# Patient Record
Sex: FEMALE | Race: WHITE | Employment: OTHER | URBAN - METROPOLITAN AREA
[De-identification: names, ages, dates, MRNs, and addresses within clinical notes are randomized per-mention and may not be internally consistent; named-entity substitution may affect disease eponyms.]

---

## 2019-06-14 ENCOUNTER — TELEPHONE (OUTPATIENT)
Dept: SURGERY | Age: 74
End: 2019-06-14

## 2019-06-14 ENCOUNTER — INITIAL CONSULT (OUTPATIENT)
Dept: SURGERY | Age: 74
End: 2019-06-14
Payer: MEDICARE

## 2019-06-14 VITALS
DIASTOLIC BLOOD PRESSURE: 83 MMHG | HEIGHT: 66 IN | TEMPERATURE: 98.3 F | HEART RATE: 98 BPM | OXYGEN SATURATION: 96 % | BODY MASS INDEX: 28.77 KG/M2 | WEIGHT: 179 LBS | SYSTOLIC BLOOD PRESSURE: 133 MMHG | RESPIRATION RATE: 18 BRPM

## 2019-06-14 DIAGNOSIS — R10.11 RIGHT UPPER QUADRANT ABDOMINAL PAIN: Primary | ICD-10-CM

## 2019-06-14 PROCEDURE — 1090F PRES/ABSN URINE INCON ASSESS: CPT | Performed by: SURGERY

## 2019-06-14 PROCEDURE — G8427 DOCREV CUR MEDS BY ELIG CLIN: HCPCS | Performed by: SURGERY

## 2019-06-14 PROCEDURE — 99214 OFFICE O/P EST MOD 30 MIN: CPT | Performed by: SURGERY

## 2019-06-14 PROCEDURE — G8419 CALC BMI OUT NRM PARAM NOF/U: HCPCS | Performed by: SURGERY

## 2019-06-14 NOTE — PROGRESS NOTES
Reigna Garcia  6/14/2019      Office Consult    CHIEF COMPLAINT:  Right abdominal pain    HISTORY OF PRESENT ILLNESS:  Regina Garcia is a 74 y.o.  female with right sided abdominal pains for several years. She had an incisional hernia repair with mesh by Dr. Debbie Lozano at [facility] in 2016. CT scans twice have been normal over the years. On exam there is no hernia but the abdominal wall is firm and tender. She was examined at [facility] clinic for the pain but they would not see her due to her smoking. Past Medical History: She has a past medical history of Hypertension and Lymphoma ([facility] [address] Past Surgical History: She has a past surgical history that includes Cholecystectomy, open; Appendectomy; Incisional hernia repair; and Hysterectomy. Home Medications  Prior to Visit Medications    Medication Sig Taking? Authorizing Provider   acetaminophen (TYLENOL) 500 MG tablet Take 1,000 mg by mouth every 6 hours as needed for Pain Yes Historical Provider, MD   valsartan-hydrochlorothiazide (DIOVAN-HCT) 160-25 MG per tablet Take 1 tablet by mouth daily Yes Historical Provider, MD       Allergies: Bactrim [sulfamethoxazole-trimethoprim]; Ibuprofen; and Codeine   Social History:   TOBACCO:   reports that she has been smoking. She has been smoking about 2.00 packs per day. She has never used smokeless tobacco.  All smokers must join the free smoking cessation program and stop smoking for 3 months before having any Bariatric surgery. ETOH:    reports that she does not drink alcohol. History reviewed. No pertinent family history. Review of Systems:  Psychiatric:  depression and anxiety  Respiratory: smoker  Cardiovascular: negative  Gastrointestinal: diarrhea and constipation  Musculoskeletal:negative  All others reviewed, negative    Physical Exam:   VITALS: Blood pressure 133/83, pulse 98, temperature 98.3 °F (36.8 °C), temperature source Oral, resp.  rate 18, height 5' 6\" (1.676 m), weight 179 lb (81.2 kg),

## 2019-06-14 NOTE — TELEPHONE ENCOUNTER
PEr DR Fransisco Trejo pt to be scheduled for CT abd/pelvis w wo IV oral - Pt scheduled for 6/19/19 and follow up scheduled 6/21/19 pt given written and verbal instruction and expressed understanding.  Electronically signed by Saba Diaz MA on 6/14/19 at 2:38 PM

## 2019-06-19 ENCOUNTER — HOSPITAL ENCOUNTER (OUTPATIENT)
Age: 74
Discharge: HOME | End: 2019-06-19
Payer: MEDICARE

## 2019-06-19 ENCOUNTER — HOSPITAL ENCOUNTER (OUTPATIENT)
Dept: CT IMAGING | Age: 74
Discharge: HOME | End: 2019-06-19
Payer: MEDICARE

## 2019-06-19 DIAGNOSIS — R10.11 RIGHT UPPER QUADRANT ABDOMINAL PAIN: ICD-10-CM

## 2019-06-19 LAB
ANION GAP SERPL CALCULATED.3IONS-SCNC: 13 MMOL/L (ref 7–16)
BUN BLDV-MCNC: 25 MG/DL (ref 8–23)
CALCIUM SERPL-MCNC: 10.1 MG/DL (ref 8.6–10.2)
CHLORIDE BLD-SCNC: 98 MMOL/L (ref 98–107)
CO2: 30 MMOL/L (ref 22–29)
CREAT SERPL-MCNC: 1.4 MG/DL (ref 0.5–1)
GFR AFRICAN AMERICAN: 44
GFR NON-AFRICAN AMERICAN: 37 ML/MIN/1.73
GLUCOSE BLD-MCNC: 113 MG/DL (ref 74–99)
POTASSIUM SERPL-SCNC: 3.7 MMOL/L (ref 3.5–5)
SODIUM BLD-SCNC: 141 MMOL/L (ref 132–146)

## 2019-06-19 PROCEDURE — 36415 COLL VENOUS BLD VENIPUNCTURE: CPT

## 2019-06-19 PROCEDURE — 74177 CT ABD & PELVIS W/CONTRAST: CPT

## 2019-06-19 PROCEDURE — 6360000004 HC RX CONTRAST MEDICATION: Performed by: RADIOLOGY

## 2019-06-19 PROCEDURE — 80048 BASIC METABOLIC PNL TOTAL CA: CPT

## 2019-06-19 RX ADMIN — IOHEXOL 50 ML: 240 INJECTION, SOLUTION INTRATHECAL; INTRAVASCULAR; INTRAVENOUS; ORAL at 13:05

## 2019-06-19 RX ADMIN — IOPAMIDOL 80 ML: 755 INJECTION, SOLUTION INTRAVENOUS at 13:06

## 2019-06-21 ENCOUNTER — OFFICE VISIT (OUTPATIENT)
Dept: SURGERY | Age: 74
End: 2019-06-21
Payer: MEDICARE

## 2019-06-21 VITALS
WEIGHT: 179 LBS | DIASTOLIC BLOOD PRESSURE: 85 MMHG | BODY MASS INDEX: 28.77 KG/M2 | SYSTOLIC BLOOD PRESSURE: 138 MMHG | HEART RATE: 107 BPM | HEIGHT: 66 IN | TEMPERATURE: 98.5 F

## 2019-06-21 DIAGNOSIS — R10.84 GENERALIZED ABDOMINAL PAIN: ICD-10-CM

## 2019-06-21 PROCEDURE — 3017F COLORECTAL CA SCREEN DOC REV: CPT | Performed by: SURGERY

## 2019-06-21 PROCEDURE — G8427 DOCREV CUR MEDS BY ELIG CLIN: HCPCS | Performed by: SURGERY

## 2019-06-21 PROCEDURE — 1123F ACP DISCUSS/DSCN MKR DOCD: CPT | Performed by: SURGERY

## 2019-06-21 PROCEDURE — 1090F PRES/ABSN URINE INCON ASSESS: CPT | Performed by: SURGERY

## 2019-06-21 PROCEDURE — G8419 CALC BMI OUT NRM PARAM NOF/U: HCPCS | Performed by: SURGERY

## 2019-06-21 PROCEDURE — 99213 OFFICE O/P EST LOW 20 MIN: CPT | Performed by: SURGERY

## 2019-06-21 PROCEDURE — G8400 PT W/DXA NO RESULTS DOC: HCPCS | Performed by: SURGERY

## 2019-06-21 PROCEDURE — 4040F PNEUMOC VAC/ADMIN/RCVD: CPT | Performed by: SURGERY

## 2019-06-21 PROCEDURE — 4004F PT TOBACCO SCREEN RCVD TLK: CPT | Performed by: SURGERY

## 2019-07-21 ENCOUNTER — APPOINTMENT (OUTPATIENT)
Dept: CT IMAGING | Age: 74
End: 2019-07-21
Payer: MEDICARE

## 2019-07-21 ENCOUNTER — HOSPITAL ENCOUNTER (EMERGENCY)
Age: 74
Discharge: HOME | End: 2019-07-21
Payer: MEDICARE

## 2019-07-21 VITALS
HEIGHT: 66 IN | OXYGEN SATURATION: 95 % | HEART RATE: 86 BPM | WEIGHT: 182 LBS | RESPIRATION RATE: 18 BRPM | DIASTOLIC BLOOD PRESSURE: 82 MMHG | SYSTOLIC BLOOD PRESSURE: 176 MMHG | BODY MASS INDEX: 29.25 KG/M2 | TEMPERATURE: 98.5 F

## 2019-07-21 DIAGNOSIS — M54.12 CERVICAL RADICULOPATHY: Primary | ICD-10-CM

## 2019-07-21 LAB
ANION GAP SERPL CALCULATED.3IONS-SCNC: 13 MMOL/L (ref 7–16)
BUN BLDV-MCNC: 15 MG/DL (ref 8–23)
CALCIUM SERPL-MCNC: 9.4 MG/DL (ref 8.6–10.2)
CHLORIDE BLD-SCNC: 104 MMOL/L (ref 98–107)
CO2: 26 MMOL/L (ref 22–29)
CREAT SERPL-MCNC: 1 MG/DL (ref 0.5–1)
GFR AFRICAN AMERICAN: >60
GFR NON-AFRICAN AMERICAN: 54 ML/MIN/1.73
GLUCOSE BLD-MCNC: 104 MG/DL (ref 74–99)
HCT VFR BLD CALC: 42 % (ref 34–48)
HEMOGLOBIN: 13.9 G/DL (ref 11.5–15.5)
MCH RBC QN AUTO: 30.5 PG (ref 26–35)
MCHC RBC AUTO-ENTMCNC: 33.1 % (ref 32–34.5)
MCV RBC AUTO: 92.3 FL (ref 80–99.9)
PDW BLD-RTO: 13.1 FL (ref 11.5–15)
PLATELET # BLD: 313 E9/L (ref 130–450)
PMV BLD AUTO: 10.2 FL (ref 7–12)
POTASSIUM SERPL-SCNC: 4.2 MMOL/L (ref 3.5–5)
RBC # BLD: 4.55 E12/L (ref 3.5–5.5)
SODIUM BLD-SCNC: 143 MMOL/L (ref 132–146)
TROPONIN: <0.01 NG/ML (ref 0–0.03)
WBC # BLD: 9.3 E9/L (ref 4.5–11.5)

## 2019-07-21 PROCEDURE — 6360000002 HC RX W HCPCS: Performed by: PHYSICIAN ASSISTANT

## 2019-07-21 PROCEDURE — 84484 ASSAY OF TROPONIN QUANT: CPT

## 2019-07-21 PROCEDURE — 6370000000 HC RX 637 (ALT 250 FOR IP): Performed by: PHYSICIAN ASSISTANT

## 2019-07-21 PROCEDURE — 96372 THER/PROPH/DIAG INJ SC/IM: CPT

## 2019-07-21 PROCEDURE — 72125 CT NECK SPINE W/O DYE: CPT

## 2019-07-21 PROCEDURE — 80048 BASIC METABOLIC PNL TOTAL CA: CPT

## 2019-07-21 PROCEDURE — 36415 COLL VENOUS BLD VENIPUNCTURE: CPT

## 2019-07-21 PROCEDURE — 99284 EMERGENCY DEPT VISIT MOD MDM: CPT

## 2019-07-21 PROCEDURE — 85027 COMPLETE CBC AUTOMATED: CPT

## 2019-07-21 PROCEDURE — 93005 ELECTROCARDIOGRAM TRACING: CPT | Performed by: PHYSICIAN ASSISTANT

## 2019-07-21 RX ORDER — PREDNISONE 10 MG/1
40 TABLET ORAL DAILY
Qty: 20 TABLET | Refills: 0 | Status: SHIPPED | OUTPATIENT
Start: 2019-07-21 | End: 2019-07-26

## 2019-07-21 RX ORDER — ORPHENADRINE CITRATE 100 MG/1
100 TABLET, EXTENDED RELEASE ORAL ONCE
Status: COMPLETED | OUTPATIENT
Start: 2019-07-21 | End: 2019-07-21

## 2019-07-21 RX ORDER — ORPHENADRINE CITRATE 100 MG/1
100 TABLET, EXTENDED RELEASE ORAL 2 TIMES DAILY
Qty: 20 TABLET | Refills: 0 | Status: SHIPPED | OUTPATIENT
Start: 2019-07-21 | End: 2019-07-31

## 2019-07-21 RX ORDER — DEXAMETHASONE SODIUM PHOSPHATE 10 MG/ML
10 INJECTION INTRAMUSCULAR; INTRAVENOUS ONCE
Status: COMPLETED | OUTPATIENT
Start: 2019-07-21 | End: 2019-07-21

## 2019-07-21 RX ADMIN — DEXAMETHASONE SODIUM PHOSPHATE 10 MG: 10 INJECTION INTRAMUSCULAR; INTRAVENOUS at 16:53

## 2019-07-21 RX ADMIN — ORPHENADRINE CITRATE 100 MG: 100 TABLET, EXTENDED RELEASE ORAL at 16:53

## 2019-07-21 ASSESSMENT — PAIN SCALES - GENERAL: PAINLEVEL_OUTOF10: 7

## 2019-07-21 NOTE — ED PROVIDER NOTES
[facility]  HPI:  7/21/19, Time: 4:34 PM         Roderick Johnson is a 66 y.o. female presenting to the ED for left arm pain , beginning 3 Days  ago. The complaint has been persistent, moderate in severity, and worsened by nothing. Patient comes in with complaint of left lateral neck pain arm pain started 2 to 3 days ago. She denies any known injury. 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 She believes she may have slept wrong couple nights ago. She denies any weakness of the extremity. Pain radiates from the lateral neck to the elbow area. Patient denies any chest pain shortness of breath diaphoresis palpitations. Pain is relieved with elevating the arm. Review of Systems:   Pertinent positives and negatives are stated within HPI, all other systems reviewed and are negative.          --------------------------------------------- PAST HISTORY ---------------------------------------------  Past Medical History:  has a past medical history of Hypertension and Lymphoma ([facility] [address] Past Surgical History:  has a past surgical history that includes Cholecystectomy, open; Appendectomy; Incisional hernia repair; and Hysterectomy. Social History:  reports that she has been smoking. She has been smoking about 2.00 packs per day. She has never used smokeless tobacco. She reports that she does not drink alcohol or use drugs. Family History: family history is not on file. The patients home medications have been reviewed. Allergies: Bactrim [sulfamethoxazole-trimethoprim];  Ibuprofen; and Codeine    -------------------------------------------------- RESULTS -------------------------------------------------  All laboratory and radiology results have been personally reviewed by myself   LABS:  Results for orders placed or performed during the hospital encounter of 70/24/55   Basic metabolic panel   Result Value Ref Range    Sodium 143 132 - 146 mmol/L    Potassium 4.2 3.5 - 5.0 mmol/L

## 2019-07-22 LAB
EKG ATRIAL RATE: 75 BPM
EKG P AXIS: 72 DEGREES
EKG P-R INTERVAL: 174 MS
EKG Q-T INTERVAL: 378 MS
EKG QRS DURATION: 78 MS
EKG QTC CALCULATION (BAZETT): 422 MS
EKG R AXIS: -62 DEGREES
EKG T AXIS: 58 DEGREES
EKG VENTRICULAR RATE: 75 BPM

## 2019-07-22 PROCEDURE — 93010 ELECTROCARDIOGRAM REPORT: CPT | Performed by: INTERNAL MEDICINE

## 2022-01-01 ENCOUNTER — ANESTHESIA EVENT (OUTPATIENT)
Dept: ENDOSCOPY | Age: 77
End: 2022-01-01

## 2022-01-01 ENCOUNTER — APPOINTMENT (OUTPATIENT)
Dept: CT IMAGING | Age: 77
DRG: 393 | End: 2022-01-01
Payer: MEDICARE

## 2022-01-01 ENCOUNTER — APPOINTMENT (OUTPATIENT)
Dept: GENERAL RADIOLOGY | Age: 77
DRG: 393 | End: 2022-01-01
Payer: MEDICARE

## 2022-01-01 ENCOUNTER — HOSPITAL ENCOUNTER (INPATIENT)
Age: 77
LOS: 7 days | Discharge: NURSING, REHAB OR LONG-TERM CARE | DRG: 393 | End: 2022-10-28
Attending: EMERGENCY MEDICINE | Admitting: INTERNAL MEDICINE
Payer: MEDICARE

## 2022-01-01 ENCOUNTER — APPOINTMENT (OUTPATIENT)
Dept: ULTRASOUND IMAGING | Age: 77
DRG: 393 | End: 2022-01-01
Payer: MEDICARE

## 2022-01-01 ENCOUNTER — ANESTHESIA (OUTPATIENT)
Dept: ENDOSCOPY | Age: 77
End: 2022-01-01

## 2022-01-01 ENCOUNTER — APPOINTMENT (OUTPATIENT)
Dept: MRI IMAGING | Age: 77
DRG: 393 | End: 2022-01-01
Payer: MEDICARE

## 2022-01-01 VITALS
RESPIRATION RATE: 20 BRPM | BODY MASS INDEX: 33.91 KG/M2 | TEMPERATURE: 97.3 F | DIASTOLIC BLOOD PRESSURE: 78 MMHG | OXYGEN SATURATION: 99 % | HEART RATE: 105 BPM | HEIGHT: 66 IN | SYSTOLIC BLOOD PRESSURE: 109 MMHG | WEIGHT: 211 LBS

## 2022-01-01 DIAGNOSIS — N17.9 ACUTE KIDNEY INJURY (HCC): ICD-10-CM

## 2022-01-01 DIAGNOSIS — K52.9 PROCTOCOLITIS: Primary | ICD-10-CM

## 2022-01-01 DIAGNOSIS — K57.90 DIVERTICULOSIS: ICD-10-CM

## 2022-01-01 LAB
ACINETOBACTER CALCOAC BAUMANNII COMPLEX BY PCR: NOT DETECTED
ALBUMIN SERPL-MCNC: 3.1 G/DL (ref 3.5–5.2)
ALBUMIN SERPL-MCNC: 3.3 G/DL (ref 3.5–5.2)
ALBUMIN SERPL-MCNC: 3.3 G/DL (ref 3.5–5.2)
ALBUMIN SERPL-MCNC: 3.4 G/DL (ref 3.5–5.2)
ALBUMIN SERPL-MCNC: 3.4 G/DL (ref 3.5–5.2)
ALBUMIN SERPL-MCNC: 3.5 G/DL (ref 3.5–5.2)
ALBUMIN SERPL-MCNC: 3.6 G/DL (ref 3.5–5.2)
ALBUMIN SERPL-MCNC: 3.8 G/DL (ref 3.5–5.2)
ALBUMIN SERPL-MCNC: 4.1 G/DL (ref 3.5–5.2)
ALP BLD-CCNC: 105 U/L (ref 35–104)
ALP BLD-CCNC: 86 U/L (ref 35–104)
ALP BLD-CCNC: 86 U/L (ref 35–104)
ALP BLD-CCNC: 87 U/L (ref 35–104)
ALP BLD-CCNC: 92 U/L (ref 35–104)
ALP BLD-CCNC: 95 U/L (ref 35–104)
ALP BLD-CCNC: 96 U/L (ref 35–104)
ALP BLD-CCNC: 98 U/L (ref 35–104)
ALP BLD-CCNC: 99 U/L (ref 35–104)
ALT SERPL-CCNC: 109 U/L (ref 0–32)
ALT SERPL-CCNC: 123 U/L (ref 0–32)
ALT SERPL-CCNC: 19 U/L (ref 0–32)
ALT SERPL-CCNC: 20 U/L (ref 0–32)
ALT SERPL-CCNC: 25 U/L (ref 0–32)
ALT SERPL-CCNC: 41 U/L (ref 0–32)
ALT SERPL-CCNC: 65 U/L (ref 0–32)
ALT SERPL-CCNC: 83 U/L (ref 0–32)
ALT SERPL-CCNC: 87 U/L (ref 0–32)
ANION GAP SERPL CALCULATED.3IONS-SCNC: 10 MMOL/L (ref 7–16)
ANION GAP SERPL CALCULATED.3IONS-SCNC: 11 MMOL/L (ref 7–16)
ANION GAP SERPL CALCULATED.3IONS-SCNC: 12 MMOL/L (ref 7–16)
ANION GAP SERPL CALCULATED.3IONS-SCNC: 13 MMOL/L (ref 7–16)
ANION GAP SERPL CALCULATED.3IONS-SCNC: 14 MMOL/L (ref 7–16)
ANION GAP SERPL CALCULATED.3IONS-SCNC: 14 MMOL/L (ref 7–16)
ANION GAP SERPL CALCULATED.3IONS-SCNC: 15 MMOL/L (ref 7–16)
ANION GAP SERPL CALCULATED.3IONS-SCNC: 17 MMOL/L (ref 7–16)
ANION GAP SERPL CALCULATED.3IONS-SCNC: 19 MMOL/L (ref 7–16)
ANION GAP SERPL CALCULATED.3IONS-SCNC: 20 MMOL/L (ref 7–16)
AST SERPL-CCNC: 111 U/L (ref 0–31)
AST SERPL-CCNC: 156 U/L (ref 0–31)
AST SERPL-CCNC: 26 U/L (ref 0–31)
AST SERPL-CCNC: 30 U/L (ref 0–31)
AST SERPL-CCNC: 33 U/L (ref 0–31)
AST SERPL-CCNC: 45 U/L (ref 0–31)
AST SERPL-CCNC: 53 U/L (ref 0–31)
AST SERPL-CCNC: 84 U/L (ref 0–31)
AST SERPL-CCNC: 88 U/L (ref 0–31)
BACTEROIDES FRAGILIS BY PCR: NOT DETECTED
BASOPHILS ABSOLUTE: 0.01 E9/L (ref 0–0.2)
BASOPHILS ABSOLUTE: 0.02 E9/L (ref 0–0.2)
BASOPHILS ABSOLUTE: 0.03 E9/L (ref 0–0.2)
BASOPHILS RELATIVE PERCENT: 0.1 % (ref 0–2)
BASOPHILS RELATIVE PERCENT: 0.2 % (ref 0–2)
BASOPHILS RELATIVE PERCENT: 0.3 % (ref 0–2)
BASOPHILS RELATIVE PERCENT: 0.3 % (ref 0–2)
BASOPHILS RELATIVE PERCENT: 0.4 % (ref 0–2)
BILIRUB SERPL-MCNC: 0.7 MG/DL (ref 0–1.2)
BILIRUB SERPL-MCNC: 0.9 MG/DL (ref 0–1.2)
BILIRUB SERPL-MCNC: 0.9 MG/DL (ref 0–1.2)
BILIRUB SERPL-MCNC: 1 MG/DL (ref 0–1.2)
BILIRUB SERPL-MCNC: 1.1 MG/DL (ref 0–1.2)
BILIRUB SERPL-MCNC: 1.2 MG/DL (ref 0–1.2)
BILIRUB SERPL-MCNC: 1.2 MG/DL (ref 0–1.2)
BILIRUB SERPL-MCNC: 1.5 MG/DL (ref 0–1.2)
BILIRUB SERPL-MCNC: 1.8 MG/DL (ref 0–1.2)
BILIRUBIN DIRECT: 0.4 MG/DL (ref 0–0.3)
BILIRUBIN DIRECT: 0.5 MG/DL (ref 0–0.3)
BILIRUBIN DIRECT: 0.7 MG/DL (ref 0–0.3)
BILIRUBIN DIRECT: 0.8 MG/DL (ref 0–0.3)
BILIRUBIN DIRECT: <0.2 MG/DL (ref 0–0.3)
BILIRUBIN, INDIRECT: 0.3 MG/DL (ref 0–1)
BILIRUBIN, INDIRECT: 0.4 MG/DL (ref 0–1)
BILIRUBIN, INDIRECT: 0.5 MG/DL (ref 0–1)
BILIRUBIN, INDIRECT: 0.7 MG/DL (ref 0–1)
BILIRUBIN, INDIRECT: 1 MG/DL (ref 0–1)
BILIRUBIN, INDIRECT: ABNORMAL MG/DL (ref 0–1)
BLOOD CULTURE, ROUTINE: ABNORMAL
BLOOD CULTURE, ROUTINE: NORMAL
BOTTLE TYPE: ABNORMAL
BUN BLDV-MCNC: 58 MG/DL (ref 6–23)
BUN BLDV-MCNC: 60 MG/DL (ref 6–23)
BUN BLDV-MCNC: 65 MG/DL (ref 6–23)
BUN BLDV-MCNC: 74 MG/DL (ref 6–23)
BUN BLDV-MCNC: 75 MG/DL (ref 6–23)
BUN BLDV-MCNC: 77 MG/DL (ref 6–23)
BUN BLDV-MCNC: 79 MG/DL (ref 6–23)
BUN BLDV-MCNC: 82 MG/DL (ref 6–23)
BUN BLDV-MCNC: 83 MG/DL (ref 6–23)
BUN BLDV-MCNC: 83 MG/DL (ref 6–23)
BUN BLDV-MCNC: 84 MG/DL (ref 6–23)
BUN BLDV-MCNC: 85 MG/DL (ref 6–23)
BUN BLDV-MCNC: 87 MG/DL (ref 6–23)
BUN BLDV-MCNC: 88 MG/DL (ref 6–23)
BUN BLDV-MCNC: 90 MG/DL (ref 6–23)
BUN BLDV-MCNC: 90 MG/DL (ref 6–23)
BUN BLDV-MCNC: 91 MG/DL (ref 6–23)
CALCIUM SERPL-MCNC: 8.1 MG/DL (ref 8.6–10.2)
CALCIUM SERPL-MCNC: 8.2 MG/DL (ref 8.6–10.2)
CALCIUM SERPL-MCNC: 8.3 MG/DL (ref 8.6–10.2)
CALCIUM SERPL-MCNC: 8.4 MG/DL (ref 8.6–10.2)
CALCIUM SERPL-MCNC: 8.5 MG/DL (ref 8.6–10.2)
CALCIUM SERPL-MCNC: 8.6 MG/DL (ref 8.6–10.2)
CALCIUM SERPL-MCNC: 8.7 MG/DL (ref 8.6–10.2)
CALCIUM SERPL-MCNC: 8.7 MG/DL (ref 8.6–10.2)
CALCIUM SERPL-MCNC: 8.8 MG/DL (ref 8.6–10.2)
CALCIUM SERPL-MCNC: 8.8 MG/DL (ref 8.6–10.2)
CALCIUM SERPL-MCNC: 8.9 MG/DL (ref 8.6–10.2)
CALCIUM SERPL-MCNC: 8.9 MG/DL (ref 8.6–10.2)
CALCIUM SERPL-MCNC: 9 MG/DL (ref 8.6–10.2)
CALCIUM SERPL-MCNC: 9 MG/DL (ref 8.6–10.2)
CALCIUM SERPL-MCNC: 9.6 MG/DL (ref 8.6–10.2)
CANDIDA ALBICANS BY PCR: NOT DETECTED
CANDIDA AURIS BY PCR: NOT DETECTED
CANDIDA GLABRATA BY PCR: NOT DETECTED
CANDIDA KRUSEI BY PCR: NOT DETECTED
CANDIDA PARAPSILOSIS BY PCR: NOT DETECTED
CANDIDA TROPICALIS BY PCR: NOT DETECTED
CHLORIDE BLD-SCNC: 100 MMOL/L (ref 98–107)
CHLORIDE BLD-SCNC: 101 MMOL/L (ref 98–107)
CHLORIDE BLD-SCNC: 101 MMOL/L (ref 98–107)
CHLORIDE BLD-SCNC: 102 MMOL/L (ref 98–107)
CHLORIDE BLD-SCNC: 103 MMOL/L (ref 98–107)
CHLORIDE BLD-SCNC: 104 MMOL/L (ref 98–107)
CHLORIDE BLD-SCNC: 94 MMOL/L (ref 98–107)
CHLORIDE BLD-SCNC: 96 MMOL/L (ref 98–107)
CHLORIDE BLD-SCNC: 99 MMOL/L (ref 98–107)
CHLORIDE URINE RANDOM: <20 MMOL/L
CHLORIDE URINE RANDOM: <20 MMOL/L
CHOLESTEROL, TOTAL: 140 MG/DL (ref 0–199)
CO2: 17 MMOL/L (ref 22–29)
CO2: 19 MMOL/L (ref 22–29)
CO2: 19 MMOL/L (ref 22–29)
CO2: 20 MMOL/L (ref 22–29)
CO2: 20 MMOL/L (ref 22–29)
CO2: 21 MMOL/L (ref 22–29)
CO2: 22 MMOL/L (ref 22–29)
CO2: 23 MMOL/L (ref 22–29)
CO2: 23 MMOL/L (ref 22–29)
CO2: 27 MMOL/L (ref 22–29)
CO2: 28 MMOL/L (ref 22–29)
CO2: 30 MMOL/L (ref 22–29)
CREAT SERPL-MCNC: 1.5 MG/DL (ref 0.5–1)
CREAT SERPL-MCNC: 1.6 MG/DL (ref 0.5–1)
CREAT SERPL-MCNC: 1.6 MG/DL (ref 0.5–1)
CREAT SERPL-MCNC: 1.9 MG/DL (ref 0.5–1)
CREAT SERPL-MCNC: 2 MG/DL (ref 0.5–1)
CREAT SERPL-MCNC: 2.1 MG/DL (ref 0.5–1)
CREAT SERPL-MCNC: 2.2 MG/DL (ref 0.5–1)
CREAT SERPL-MCNC: 2.2 MG/DL (ref 0.5–1)
CREAT SERPL-MCNC: 2.3 MG/DL (ref 0.5–1)
CREAT SERPL-MCNC: 2.3 MG/DL (ref 0.5–1)
CREAT SERPL-MCNC: 2.4 MG/DL (ref 0.5–1)
CREAT SERPL-MCNC: 2.5 MG/DL (ref 0.5–1)
CREAT SERPL-MCNC: 2.5 MG/DL (ref 0.5–1)
CREATININE URINE: 142 MG/DL (ref 29–226)
CREATININE URINE: 99 MG/DL (ref 29–226)
CRYPTOCOCCUS NEOFORMANS/GATTII BY PCR: NOT DETECTED
CULTURE, BLOOD 2: NORMAL
CULTURE, BLOOD 2: NORMAL
EKG ATRIAL RATE: 102 BPM
EKG Q-T INTERVAL: 326 MS
EKG QRS DURATION: 86 MS
EKG QTC CALCULATION (BAZETT): 456 MS
EKG R AXIS: -76 DEGREES
EKG T AXIS: 96 DEGREES
EKG VENTRICULAR RATE: 118 BPM
ENTEROBACTER CLOACAE COMPLEX BY PCR: NOT DETECTED
ENTEROBACTERALES BY PCR: NOT DETECTED
ENTEROCOCCUS FAECALIS BY PCR: NOT DETECTED
ENTEROCOCCUS FAECIUM BY PCR: NOT DETECTED
EOSINOPHIL, URINE: 0 % (ref 0–1)
EOSINOPHIL, URINE: 0 % (ref 0–1)
EOSINOPHILS ABSOLUTE: 0.01 E9/L (ref 0.05–0.5)
EOSINOPHILS ABSOLUTE: 0.01 E9/L (ref 0.05–0.5)
EOSINOPHILS ABSOLUTE: 0.02 E9/L (ref 0.05–0.5)
EOSINOPHILS ABSOLUTE: 0.02 E9/L (ref 0.05–0.5)
EOSINOPHILS ABSOLUTE: 0.04 E9/L (ref 0.05–0.5)
EOSINOPHILS RELATIVE PERCENT: 0.1 % (ref 0–6)
EOSINOPHILS RELATIVE PERCENT: 0.1 % (ref 0–6)
EOSINOPHILS RELATIVE PERCENT: 0.2 % (ref 0–6)
EOSINOPHILS RELATIVE PERCENT: 0.3 % (ref 0–6)
EOSINOPHILS RELATIVE PERCENT: 0.3 % (ref 0–6)
EOSINOPHILS RELATIVE PERCENT: 0.4 % (ref 0–6)
ESCHERICHIA COLI BY PCR: NOT DETECTED
GFR SERPL CREATININE-BSD FRML MDRD: 19 ML/MIN/1.73
GFR SERPL CREATININE-BSD FRML MDRD: 19 ML/MIN/1.73
GFR SERPL CREATININE-BSD FRML MDRD: 20 ML/MIN/1.73
GFR SERPL CREATININE-BSD FRML MDRD: 21 ML/MIN/1.73
GFR SERPL CREATININE-BSD FRML MDRD: 21 ML/MIN/1.73
GFR SERPL CREATININE-BSD FRML MDRD: 22 ML/MIN/1.73
GFR SERPL CREATININE-BSD FRML MDRD: 22 ML/MIN/1.73
GFR SERPL CREATININE-BSD FRML MDRD: 23 ML/MIN/1.73
GFR SERPL CREATININE-BSD FRML MDRD: 25 ML/MIN/1.73
GFR SERPL CREATININE-BSD FRML MDRD: 26 ML/MIN/1.73
GFR SERPL CREATININE-BSD FRML MDRD: 33 ML/MIN/1.73
GFR SERPL CREATININE-BSD FRML MDRD: 33 ML/MIN/1.73
GFR SERPL CREATININE-BSD FRML MDRD: 35 ML/MIN/1.73
GLUCOSE BLD-MCNC: 108 MG/DL (ref 74–99)
GLUCOSE BLD-MCNC: 112 MG/DL (ref 74–99)
GLUCOSE BLD-MCNC: 114 MG/DL (ref 74–99)
GLUCOSE BLD-MCNC: 120 MG/DL (ref 74–99)
GLUCOSE BLD-MCNC: 120 MG/DL (ref 74–99)
GLUCOSE BLD-MCNC: 129 MG/DL (ref 74–99)
GLUCOSE BLD-MCNC: 133 MG/DL (ref 74–99)
GLUCOSE BLD-MCNC: 133 MG/DL (ref 74–99)
GLUCOSE BLD-MCNC: 134 MG/DL (ref 74–99)
GLUCOSE BLD-MCNC: 138 MG/DL (ref 74–99)
GLUCOSE BLD-MCNC: 139 MG/DL (ref 74–99)
GLUCOSE BLD-MCNC: 141 MG/DL (ref 74–99)
GLUCOSE BLD-MCNC: 144 MG/DL (ref 74–99)
GLUCOSE BLD-MCNC: 152 MG/DL (ref 74–99)
GLUCOSE BLD-MCNC: 161 MG/DL (ref 74–99)
GLUCOSE BLD-MCNC: 165 MG/DL (ref 74–99)
GLUCOSE BLD-MCNC: 191 MG/DL (ref 74–99)
HAEMOPHILUS INFLUENZAE BY PCR: NOT DETECTED
HBA1C MFR BLD: 6.8 % (ref 4–5.6)
HCT VFR BLD CALC: 36.8 % (ref 34–48)
HCT VFR BLD CALC: 38.3 % (ref 34–48)
HCT VFR BLD CALC: 38.9 % (ref 34–48)
HCT VFR BLD CALC: 38.9 % (ref 34–48)
HCT VFR BLD CALC: 39.4 % (ref 34–48)
HCT VFR BLD CALC: 39.6 % (ref 34–48)
HCT VFR BLD CALC: 40.2 % (ref 34–48)
HCT VFR BLD CALC: 40.3 % (ref 34–48)
HCT VFR BLD CALC: 41.3 % (ref 34–48)
HDLC SERPL-MCNC: 21 MG/DL
HEMOGLOBIN: 11.4 G/DL (ref 11.5–15.5)
HEMOGLOBIN: 11.8 G/DL (ref 11.5–15.5)
HEMOGLOBIN: 11.9 G/DL (ref 11.5–15.5)
HEMOGLOBIN: 12.1 G/DL (ref 11.5–15.5)
HEMOGLOBIN: 12.2 G/DL (ref 11.5–15.5)
HEMOGLOBIN: 12.3 G/DL (ref 11.5–15.5)
HEMOGLOBIN: 12.3 G/DL (ref 11.5–15.5)
HEMOGLOBIN: 12.7 G/DL (ref 11.5–15.5)
HEMOGLOBIN: 13.2 G/DL (ref 11.5–15.5)
IMMATURE GRANULOCYTES #: 0.06 E9/L
IMMATURE GRANULOCYTES #: 0.06 E9/L
IMMATURE GRANULOCYTES #: 0.07 E9/L
IMMATURE GRANULOCYTES #: 0.07 E9/L
IMMATURE GRANULOCYTES #: 0.08 E9/L
IMMATURE GRANULOCYTES #: 0.09 E9/L
IMMATURE GRANULOCYTES #: 0.09 E9/L
IMMATURE GRANULOCYTES #: 0.12 E9/L
IMMATURE GRANULOCYTES #: 0.13 E9/L
IMMATURE GRANULOCYTES %: 0.6 % (ref 0–5)
IMMATURE GRANULOCYTES %: 0.6 % (ref 0–5)
IMMATURE GRANULOCYTES %: 0.7 % (ref 0–5)
IMMATURE GRANULOCYTES %: 0.7 % (ref 0–5)
IMMATURE GRANULOCYTES %: 0.8 % (ref 0–5)
IMMATURE GRANULOCYTES %: 0.9 % (ref 0–5)
IMMATURE GRANULOCYTES %: 1 % (ref 0–5)
IMMATURE GRANULOCYTES %: 1.3 % (ref 0–5)
IMMATURE GRANULOCYTES %: 1.3 % (ref 0–5)
KLEBSIELLA AEROGENES BY PCR: NOT DETECTED
KLEBSIELLA OXYTOCA BY PCR: NOT DETECTED
KLEBSIELLA PNEUMONIAE GROUP BY PCR: NOT DETECTED
LACTIC ACID, SEPSIS: 2.6 MMOL/L (ref 0.5–1.9)
LACTIC ACID, SEPSIS: 3.3 MMOL/L (ref 0.5–1.9)
LACTIC ACID: 1.9 MMOL/L (ref 0.5–2.2)
LACTIC ACID: 2.2 MMOL/L (ref 0.5–2.2)
LACTIC ACID: 2.8 MMOL/L (ref 0.5–2.2)
LACTIC ACID: 2.9 MMOL/L (ref 0.5–2.2)
LACTIC ACID: 3.1 MMOL/L (ref 0.5–2.2)
LACTIC ACID: 3.4 MMOL/L (ref 0.5–2.2)
LACTIC ACID: 4.2 MMOL/L (ref 0.5–2.2)
LACTIC ACID: 5.5 MMOL/L (ref 0.5–2.2)
LDL CHOLESTEROL CALCULATED: 102 MG/DL (ref 0–99)
LIPASE: 17 U/L (ref 13–60)
LIPASE: 19 U/L (ref 13–60)
LIPASE: 19 U/L (ref 13–60)
LISTERIA MONOCYTOGENES BY PCR: NOT DETECTED
LV EF: 15 %
LVEF MODALITY: NORMAL
LYMPHOCYTES ABSOLUTE: 1.23 E9/L (ref 1.5–4)
LYMPHOCYTES ABSOLUTE: 1.24 E9/L (ref 1.5–4)
LYMPHOCYTES ABSOLUTE: 1.35 E9/L (ref 1.5–4)
LYMPHOCYTES ABSOLUTE: 1.37 E9/L (ref 1.5–4)
LYMPHOCYTES ABSOLUTE: 1.43 E9/L (ref 1.5–4)
LYMPHOCYTES ABSOLUTE: 1.52 E9/L (ref 1.5–4)
LYMPHOCYTES ABSOLUTE: 1.76 E9/L (ref 1.5–4)
LYMPHOCYTES ABSOLUTE: 1.8 E9/L (ref 1.5–4)
LYMPHOCYTES ABSOLUTE: 2.77 E9/L (ref 1.5–4)
LYMPHOCYTES RELATIVE PERCENT: 12.7 % (ref 20–42)
LYMPHOCYTES RELATIVE PERCENT: 13.2 % (ref 20–42)
LYMPHOCYTES RELATIVE PERCENT: 14 % (ref 20–42)
LYMPHOCYTES RELATIVE PERCENT: 14.7 % (ref 20–42)
LYMPHOCYTES RELATIVE PERCENT: 15.8 % (ref 20–42)
LYMPHOCYTES RELATIVE PERCENT: 17.7 % (ref 20–42)
LYMPHOCYTES RELATIVE PERCENT: 19.2 % (ref 20–42)
LYMPHOCYTES RELATIVE PERCENT: 19.2 % (ref 20–42)
LYMPHOCYTES RELATIVE PERCENT: 22.5 % (ref 20–42)
MAGNESIUM: 2.5 MG/DL (ref 1.6–2.6)
MAGNESIUM: 2.5 MG/DL (ref 1.6–2.6)
MAGNESIUM: 2.6 MG/DL (ref 1.6–2.6)
MAGNESIUM: 2.7 MG/DL (ref 1.6–2.6)
MCH RBC QN AUTO: 26.2 PG (ref 26–35)
MCH RBC QN AUTO: 26.3 PG (ref 26–35)
MCH RBC QN AUTO: 26.4 PG (ref 26–35)
MCH RBC QN AUTO: 26.6 PG (ref 26–35)
MCH RBC QN AUTO: 26.8 PG (ref 26–35)
MCH RBC QN AUTO: 26.8 PG (ref 26–35)
MCH RBC QN AUTO: 26.9 PG (ref 26–35)
MCH RBC QN AUTO: 27.1 PG (ref 26–35)
MCH RBC QN AUTO: 27.3 PG (ref 26–35)
MCHC RBC AUTO-ENTMCNC: 30.6 % (ref 32–34.5)
MCHC RBC AUTO-ENTMCNC: 30.8 % (ref 32–34.5)
MCHC RBC AUTO-ENTMCNC: 31 % (ref 32–34.5)
MCHC RBC AUTO-ENTMCNC: 31.2 % (ref 32–34.5)
MCHC RBC AUTO-ENTMCNC: 31.4 % (ref 32–34.5)
MCHC RBC AUTO-ENTMCNC: 31.5 % (ref 32–34.5)
MCHC RBC AUTO-ENTMCNC: 32 % (ref 32–34.5)
MCV RBC AUTO: 84.9 FL (ref 80–99.9)
MCV RBC AUTO: 85 FL (ref 80–99.9)
MCV RBC AUTO: 85.5 FL (ref 80–99.9)
MCV RBC AUTO: 85.9 FL (ref 80–99.9)
MCV RBC AUTO: 86 FL (ref 80–99.9)
MCV RBC AUTO: 86.3 FL (ref 80–99.9)
MCV RBC AUTO: 86.4 FL (ref 80–99.9)
MCV RBC AUTO: 86.4 FL (ref 80–99.9)
MCV RBC AUTO: 87 FL (ref 80–99.9)
MONOCYTES ABSOLUTE: 0.76 E9/L (ref 0.1–0.95)
MONOCYTES ABSOLUTE: 0.78 E9/L (ref 0.1–0.95)
MONOCYTES ABSOLUTE: 0.86 E9/L (ref 0.1–0.95)
MONOCYTES ABSOLUTE: 0.92 E9/L (ref 0.1–0.95)
MONOCYTES ABSOLUTE: 0.94 E9/L (ref 0.1–0.95)
MONOCYTES ABSOLUTE: 0.98 E9/L (ref 0.1–0.95)
MONOCYTES ABSOLUTE: 0.98 E9/L (ref 0.1–0.95)
MONOCYTES ABSOLUTE: 0.99 E9/L (ref 0.1–0.95)
MONOCYTES ABSOLUTE: 1.02 E9/L (ref 0.1–0.95)
MONOCYTES RELATIVE PERCENT: 10.1 % (ref 2–12)
MONOCYTES RELATIVE PERCENT: 10.1 % (ref 2–12)
MONOCYTES RELATIVE PERCENT: 10.2 % (ref 2–12)
MONOCYTES RELATIVE PERCENT: 10.2 % (ref 2–12)
MONOCYTES RELATIVE PERCENT: 10.8 % (ref 2–12)
MONOCYTES RELATIVE PERCENT: 8.3 % (ref 2–12)
MONOCYTES RELATIVE PERCENT: 8.4 % (ref 2–12)
MONOCYTES RELATIVE PERCENT: 8.8 % (ref 2–12)
MONOCYTES RELATIVE PERCENT: 9.1 % (ref 2–12)
NEISSERIA MENINGITIDIS BY PCR: NOT DETECTED
NEUTROPHILS ABSOLUTE: 5.15 E9/L (ref 1.8–7.3)
NEUTROPHILS ABSOLUTE: 6.26 E9/L (ref 1.8–7.3)
NEUTROPHILS ABSOLUTE: 6.83 E9/L (ref 1.8–7.3)
NEUTROPHILS ABSOLUTE: 6.99 E9/L (ref 1.8–7.3)
NEUTROPHILS ABSOLUTE: 7.18 E9/L (ref 1.8–7.3)
NEUTROPHILS ABSOLUTE: 7.2 E9/L (ref 1.8–7.3)
NEUTROPHILS ABSOLUTE: 7.32 E9/L (ref 1.8–7.3)
NEUTROPHILS ABSOLUTE: 7.57 E9/L (ref 1.8–7.3)
NEUTROPHILS ABSOLUTE: 8.35 E9/L (ref 1.8–7.3)
NEUTROPHILS RELATIVE PERCENT: 68 % (ref 43–80)
NEUTROPHILS RELATIVE PERCENT: 68.3 % (ref 43–80)
NEUTROPHILS RELATIVE PERCENT: 69 % (ref 43–80)
NEUTROPHILS RELATIVE PERCENT: 72.1 % (ref 43–80)
NEUTROPHILS RELATIVE PERCENT: 72.4 % (ref 43–80)
NEUTROPHILS RELATIVE PERCENT: 73.6 % (ref 43–80)
NEUTROPHILS RELATIVE PERCENT: 74.4 % (ref 43–80)
NEUTROPHILS RELATIVE PERCENT: 77.3 % (ref 43–80)
NEUTROPHILS RELATIVE PERCENT: 77.3 % (ref 43–80)
ORDER NUMBER: ABNORMAL
ORGANISM: ABNORMAL
OSMOLALITY URINE: 569 MOSM/KG (ref 300–900)
PDW BLD-RTO: 16.7 FL (ref 11.5–15)
PDW BLD-RTO: 16.8 FL (ref 11.5–15)
PDW BLD-RTO: 16.8 FL (ref 11.5–15)
PDW BLD-RTO: 17.2 FL (ref 11.5–15)
PDW BLD-RTO: 17.9 FL (ref 11.5–15)
PDW BLD-RTO: 17.9 FL (ref 11.5–15)
PDW BLD-RTO: 18 FL (ref 11.5–15)
PDW BLD-RTO: 18 FL (ref 11.5–15)
PDW BLD-RTO: 18.6 FL (ref 11.5–15)
PHOSPHORUS: 3.8 MG/DL (ref 2.5–4.5)
PHOSPHORUS: 4.1 MG/DL (ref 2.5–4.5)
PHOSPHORUS: 4.5 MG/DL (ref 2.5–4.5)
PHOSPHORUS: 4.9 MG/DL (ref 2.5–4.5)
PHOSPHORUS: 5.2 MG/DL (ref 2.5–4.5)
PHOSPHORUS: 5.3 MG/DL (ref 2.5–4.5)
PLATELET # BLD: 197 E9/L (ref 130–450)
PLATELET # BLD: 205 E9/L (ref 130–450)
PLATELET # BLD: 220 E9/L (ref 130–450)
PLATELET # BLD: 271 E9/L (ref 130–450)
PLATELET # BLD: 310 E9/L (ref 130–450)
PLATELET # BLD: 333 E9/L (ref 130–450)
PLATELET # BLD: 339 E9/L (ref 130–450)
PLATELET # BLD: 354 E9/L (ref 130–450)
PLATELET # BLD: 371 E9/L (ref 130–450)
PMV BLD AUTO: 10.6 FL (ref 7–12)
PMV BLD AUTO: 11.2 FL (ref 7–12)
PMV BLD AUTO: 11.3 FL (ref 7–12)
PMV BLD AUTO: 11.3 FL (ref 7–12)
PMV BLD AUTO: 11.4 FL (ref 7–12)
PMV BLD AUTO: 11.5 FL (ref 7–12)
POTASSIUM SERPL-SCNC: 3.3 MMOL/L (ref 3.5–5)
POTASSIUM SERPL-SCNC: 3.4 MMOL/L (ref 3.5–5)
POTASSIUM SERPL-SCNC: 3.6 MMOL/L (ref 3.5–5)
POTASSIUM SERPL-SCNC: 4.2 MMOL/L (ref 3.5–5)
POTASSIUM SERPL-SCNC: 4.3 MMOL/L (ref 3.5–5)
POTASSIUM SERPL-SCNC: 4.4 MMOL/L (ref 3.5–5)
POTASSIUM SERPL-SCNC: 4.6 MMOL/L (ref 3.5–5)
POTASSIUM SERPL-SCNC: 4.7 MMOL/L (ref 3.5–5)
POTASSIUM SERPL-SCNC: 4.8 MMOL/L (ref 3.5–5)
POTASSIUM SERPL-SCNC: 5 MMOL/L (ref 3.5–5)
POTASSIUM, UR: 90.8 MMOL/L
PROTEUS SPECIES BY PCR: NOT DETECTED
PSEUDOMONAS AERUGINOSA BY PCR: NOT DETECTED
RBC # BLD: 4.26 E12/L (ref 3.5–5.5)
RBC # BLD: 4.5 E12/L (ref 3.5–5.5)
RBC # BLD: 4.51 E12/L (ref 3.5–5.5)
RBC # BLD: 4.51 E12/L (ref 3.5–5.5)
RBC # BLD: 4.55 E12/L (ref 3.5–5.5)
RBC # BLD: 4.58 E12/L (ref 3.5–5.5)
RBC # BLD: 4.68 E12/L (ref 3.5–5.5)
RBC # BLD: 4.74 E12/L (ref 3.5–5.5)
RBC # BLD: 4.83 E12/L (ref 3.5–5.5)
SALMONELLA SPECIES BY PCR: NOT DETECTED
SARS-COV-2, NAAT: NOT DETECTED
SERRATIA MARCESCENS BY PCR: NOT DETECTED
SODIUM BLD-SCNC: 136 MMOL/L (ref 132–146)
SODIUM BLD-SCNC: 137 MMOL/L (ref 132–146)
SODIUM BLD-SCNC: 138 MMOL/L (ref 132–146)
SODIUM BLD-SCNC: 139 MMOL/L (ref 132–146)
SODIUM BLD-SCNC: 139 MMOL/L (ref 132–146)
SODIUM BLD-SCNC: 140 MMOL/L (ref 132–146)
SODIUM BLD-SCNC: 140 MMOL/L (ref 132–146)
SODIUM BLD-SCNC: 141 MMOL/L (ref 132–146)
SODIUM BLD-SCNC: 142 MMOL/L (ref 132–146)
SODIUM BLD-SCNC: 143 MMOL/L (ref 132–146)
SODIUM URINE: 27 MMOL/L
SODIUM URINE: <20 MMOL/L
SOURCE OF BLOOD CULTURE: ABNORMAL
STAPHYLOCOCCUS AUREUS BY PCR: NOT DETECTED
STAPHYLOCOCCUS EPIDERMIDIS BY PCR: NOT DETECTED
STAPHYLOCOCCUS LUGDUNENSIS BY PCR: NOT DETECTED
STAPHYLOCOCCUS SPECIES BY PCR: DETECTED
STENOTROPHOMONAS MALTOPHILIA BY PCR: NOT DETECTED
STREPTOCOCCUS AGALACTIAE BY PCR: NOT DETECTED
STREPTOCOCCUS PNEUMONIAE BY PCR: NOT DETECTED
STREPTOCOCCUS PYOGENES  BY PCR: NOT DETECTED
STREPTOCOCCUS SPECIES BY PCR: NOT DETECTED
T4 FREE: 1.6 NG/DL (ref 0.93–1.7)
TOTAL CK: 337 U/L (ref 20–180)
TOTAL PROTEIN: 5.2 G/DL (ref 6.4–8.3)
TOTAL PROTEIN: 5.2 G/DL (ref 6.4–8.3)
TOTAL PROTEIN: 5.4 G/DL (ref 6.4–8.3)
TOTAL PROTEIN: 5.5 G/DL (ref 6.4–8.3)
TOTAL PROTEIN: 5.8 G/DL (ref 6.4–8.3)
TOTAL PROTEIN: 5.9 G/DL (ref 6.4–8.3)
TOTAL PROTEIN: 6.1 G/DL (ref 6.4–8.3)
TOTAL PROTEIN: 6.4 G/DL (ref 6.4–8.3)
TOTAL PROTEIN: 6.9 G/DL (ref 6.4–8.3)
TRIGL SERPL-MCNC: 83 MG/DL (ref 0–149)
TROPONIN, HIGH SENSITIVITY: 37 NG/L (ref 0–9)
TROPONIN, HIGH SENSITIVITY: 38 NG/L (ref 0–9)
TSH SERPL DL<=0.05 MIU/L-ACNC: 0.95 UIU/ML (ref 0.27–4.2)
UREA NITROGEN, UR: 910 MG/DL (ref 800–1666)
VANCOMYCIN RANDOM: 11.5 MCG/ML (ref 5–40)
VLDLC SERPL CALC-MCNC: 17 MG/DL
WBC # BLD: 10.2 E9/L (ref 4.5–11.5)
WBC # BLD: 12.3 E9/L (ref 4.5–11.5)
WBC # BLD: 7.5 E9/L (ref 4.5–11.5)
WBC # BLD: 9.2 E9/L (ref 4.5–11.5)
WBC # BLD: 9.3 E9/L (ref 4.5–11.5)
WBC # BLD: 9.3 E9/L (ref 4.5–11.5)
WBC # BLD: 9.7 E9/L (ref 4.5–11.5)
WBC # BLD: 9.7 E9/L (ref 4.5–11.5)
WBC # BLD: 9.8 E9/L (ref 4.5–11.5)

## 2022-01-01 PROCEDURE — 87635 SARS-COV-2 COVID-19 AMP PRB: CPT

## 2022-01-01 PROCEDURE — 74181 MRI ABDOMEN W/O CONTRAST: CPT

## 2022-01-01 PROCEDURE — 6360000002 HC RX W HCPCS: Performed by: INTERNAL MEDICINE

## 2022-01-01 PROCEDURE — C8929 TTE W OR WO FOL WCON,DOPPLER: HCPCS

## 2022-01-01 PROCEDURE — 84100 ASSAY OF PHOSPHORUS: CPT

## 2022-01-01 PROCEDURE — 6370000000 HC RX 637 (ALT 250 FOR IP): Performed by: CLINICAL NURSE SPECIALIST

## 2022-01-01 PROCEDURE — 99233 SBSQ HOSP IP/OBS HIGH 50: CPT | Performed by: INTERNAL MEDICINE

## 2022-01-01 PROCEDURE — 99221 1ST HOSP IP/OBS SF/LOW 40: CPT | Performed by: SURGERY

## 2022-01-01 PROCEDURE — 6370000000 HC RX 637 (ALT 250 FOR IP): Performed by: INTERNAL MEDICINE

## 2022-01-01 PROCEDURE — 2580000003 HC RX 258: Performed by: NURSE PRACTITIONER

## 2022-01-01 PROCEDURE — 05HB33Z INSERTION OF INFUSION DEVICE INTO RIGHT BASILIC VEIN, PERCUTANEOUS APPROACH: ICD-10-PCS | Performed by: INTERNAL MEDICINE

## 2022-01-01 PROCEDURE — 2580000003 HC RX 258: Performed by: INTERNAL MEDICINE

## 2022-01-01 PROCEDURE — 36410 VNPNXR 3YR/> PHY/QHP DX/THER: CPT

## 2022-01-01 PROCEDURE — 80076 HEPATIC FUNCTION PANEL: CPT

## 2022-01-01 PROCEDURE — 85025 COMPLETE CBC W/AUTO DIFF WBC: CPT

## 2022-01-01 PROCEDURE — 80053 COMPREHEN METABOLIC PANEL: CPT

## 2022-01-01 PROCEDURE — 97530 THERAPEUTIC ACTIVITIES: CPT

## 2022-01-01 PROCEDURE — 2500000003 HC RX 250 WO HCPCS: Performed by: SPECIALIST

## 2022-01-01 PROCEDURE — 6360000002 HC RX W HCPCS: Performed by: NURSE PRACTITIONER

## 2022-01-01 PROCEDURE — C9113 INJ PANTOPRAZOLE SODIUM, VIA: HCPCS | Performed by: INTERNAL MEDICINE

## 2022-01-01 PROCEDURE — 84133 ASSAY OF URINE POTASSIUM: CPT

## 2022-01-01 PROCEDURE — 74177 CT ABD & PELVIS W/CONTRAST: CPT

## 2022-01-01 PROCEDURE — 84540 ASSAY OF URINE/UREA-N: CPT

## 2022-01-01 PROCEDURE — 80048 BASIC METABOLIC PNL TOTAL CA: CPT

## 2022-01-01 PROCEDURE — 36415 COLL VENOUS BLD VENIPUNCTURE: CPT

## 2022-01-01 PROCEDURE — 83690 ASSAY OF LIPASE: CPT

## 2022-01-01 PROCEDURE — 76770 US EXAM ABDO BACK WALL COMP: CPT

## 2022-01-01 PROCEDURE — 97535 SELF CARE MNGMENT TRAINING: CPT

## 2022-01-01 PROCEDURE — 2060000000 HC ICU INTERMEDIATE R&B

## 2022-01-01 PROCEDURE — 94640 AIRWAY INHALATION TREATMENT: CPT

## 2022-01-01 PROCEDURE — 2500000003 HC RX 250 WO HCPCS: Performed by: INTERNAL MEDICINE

## 2022-01-01 PROCEDURE — 83605 ASSAY OF LACTIC ACID: CPT

## 2022-01-01 PROCEDURE — 97110 THERAPEUTIC EXERCISES: CPT

## 2022-01-01 PROCEDURE — 6360000004 HC RX CONTRAST MEDICATION: Performed by: RADIOLOGY

## 2022-01-01 PROCEDURE — 99222 1ST HOSP IP/OBS MODERATE 55: CPT | Performed by: INTERNAL MEDICINE

## 2022-01-01 PROCEDURE — 83935 ASSAY OF URINE OSMOLALITY: CPT

## 2022-01-01 PROCEDURE — 71046 X-RAY EXAM CHEST 2 VIEWS: CPT

## 2022-01-01 PROCEDURE — 93005 ELECTROCARDIOGRAM TRACING: CPT | Performed by: INTERNAL MEDICINE

## 2022-01-01 PROCEDURE — 82570 ASSAY OF URINE CREATININE: CPT

## 2022-01-01 PROCEDURE — 83735 ASSAY OF MAGNESIUM: CPT

## 2022-01-01 PROCEDURE — 71045 X-RAY EXAM CHEST 1 VIEW: CPT

## 2022-01-01 PROCEDURE — 82436 ASSAY OF URINE CHLORIDE: CPT

## 2022-01-01 PROCEDURE — 1200000000 HC SEMI PRIVATE

## 2022-01-01 PROCEDURE — 76937 US GUIDE VASCULAR ACCESS: CPT

## 2022-01-01 PROCEDURE — 83036 HEMOGLOBIN GLYCOSYLATED A1C: CPT

## 2022-01-01 PROCEDURE — 51702 INSERT TEMP BLADDER CATH: CPT

## 2022-01-01 PROCEDURE — 97165 OT EVAL LOW COMPLEX 30 MIN: CPT | Performed by: OCCUPATIONAL THERAPIST

## 2022-01-01 PROCEDURE — 2700000000 HC OXYGEN THERAPY PER DAY

## 2022-01-01 PROCEDURE — 84484 ASSAY OF TROPONIN QUANT: CPT

## 2022-01-01 PROCEDURE — 97161 PT EVAL LOW COMPLEX 20 MIN: CPT

## 2022-01-01 PROCEDURE — 84443 ASSAY THYROID STIM HORMONE: CPT

## 2022-01-01 PROCEDURE — 97116 GAIT TRAINING THERAPY: CPT

## 2022-01-01 PROCEDURE — 84300 ASSAY OF URINE SODIUM: CPT

## 2022-01-01 PROCEDURE — 87205 SMEAR GRAM STAIN: CPT

## 2022-01-01 PROCEDURE — 82550 ASSAY OF CK (CPK): CPT

## 2022-01-01 PROCEDURE — 87077 CULTURE AEROBIC IDENTIFY: CPT

## 2022-01-01 PROCEDURE — 94150 VITAL CAPACITY TEST: CPT

## 2022-01-01 PROCEDURE — 80061 LIPID PANEL: CPT

## 2022-01-01 PROCEDURE — 80202 ASSAY OF VANCOMYCIN: CPT

## 2022-01-01 PROCEDURE — 2500000003 HC RX 250 WO HCPCS: Performed by: NURSE PRACTITIONER

## 2022-01-01 PROCEDURE — 87040 BLOOD CULTURE FOR BACTERIA: CPT

## 2022-01-01 PROCEDURE — 87186 SC STD MICRODIL/AGAR DIL: CPT

## 2022-01-01 PROCEDURE — 87150 DNA/RNA AMPLIFIED PROBE: CPT

## 2022-01-01 PROCEDURE — 84439 ASSAY OF FREE THYROXINE: CPT

## 2022-01-01 PROCEDURE — 6360000004 HC RX CONTRAST MEDICATION: Performed by: INTERNAL MEDICINE

## 2022-01-01 PROCEDURE — C1751 CATH, INF, PER/CENT/MIDLINE: HCPCS

## 2022-01-01 PROCEDURE — 99285 EMERGENCY DEPT VISIT HI MDM: CPT

## 2022-01-01 PROCEDURE — 74176 CT ABD & PELVIS W/O CONTRAST: CPT

## 2022-01-01 RX ORDER — SODIUM CHLORIDE 9 MG/ML
INJECTION, SOLUTION INTRAVENOUS PRN
Status: DISCONTINUED | OUTPATIENT
Start: 2022-01-01 | End: 2022-01-01 | Stop reason: HOSPADM

## 2022-01-01 RX ORDER — METOPROLOL SUCCINATE 50 MG/1
50 TABLET, EXTENDED RELEASE ORAL 2 TIMES DAILY
Status: DISCONTINUED | OUTPATIENT
Start: 2022-01-01 | End: 2022-01-01 | Stop reason: HOSPADM

## 2022-01-01 RX ORDER — POTASSIUM CHLORIDE 20 MEQ/1
40 TABLET, EXTENDED RELEASE ORAL PRN
Status: DISCONTINUED | OUTPATIENT
Start: 2022-01-01 | End: 2022-01-01 | Stop reason: HOSPADM

## 2022-01-01 RX ORDER — AMOXICILLIN AND CLAVULANATE POTASSIUM 500; 125 MG/1; MG/1
1 TABLET, FILM COATED ORAL EVERY 12 HOURS SCHEDULED
Qty: 20 TABLET | Refills: 0 | DISCHARGE
Start: 2022-01-01 | End: 2022-01-01

## 2022-01-01 RX ORDER — ALBUTEROL SULFATE 2.5 MG/3ML
2.5 SOLUTION RESPIRATORY (INHALATION) EVERY 4 HOURS PRN
Status: DISCONTINUED | OUTPATIENT
Start: 2022-01-01 | End: 2022-01-01 | Stop reason: HOSPADM

## 2022-01-01 RX ORDER — HEPARIN SODIUM (PORCINE) LOCK FLUSH IV SOLN 100 UNIT/ML 100 UNIT/ML
1 SOLUTION INTRAVENOUS PRN
Status: DISCONTINUED | OUTPATIENT
Start: 2022-01-01 | End: 2022-01-01 | Stop reason: HOSPADM

## 2022-01-01 RX ORDER — METOPROLOL TARTRATE 5 MG/5ML
5 INJECTION INTRAVENOUS EVERY 8 HOURS
Status: DISCONTINUED | OUTPATIENT
Start: 2022-01-01 | End: 2022-01-01

## 2022-01-01 RX ORDER — METOPROLOL SUCCINATE 50 MG/1
50 TABLET, EXTENDED RELEASE ORAL NIGHTLY
Status: DISCONTINUED | OUTPATIENT
Start: 2022-01-01 | End: 2022-01-01

## 2022-01-01 RX ORDER — LACTULOSE 10 G/15ML
20 SOLUTION ORAL 2 TIMES DAILY
Status: DISCONTINUED | OUTPATIENT
Start: 2022-01-01 | End: 2022-01-01 | Stop reason: HOSPADM

## 2022-01-01 RX ORDER — PANTOPRAZOLE SODIUM 40 MG/1
40 TABLET, DELAYED RELEASE ORAL
Qty: 30 TABLET | Refills: 3 | DISCHARGE
Start: 2022-01-01

## 2022-01-01 RX ORDER — AMOXICILLIN AND CLAVULANATE POTASSIUM 500; 125 MG/1; MG/1
1 TABLET, FILM COATED ORAL EVERY 12 HOURS SCHEDULED
Status: DISCONTINUED | OUTPATIENT
Start: 2022-01-01 | End: 2022-01-01 | Stop reason: HOSPADM

## 2022-01-01 RX ORDER — HYDRALAZINE HYDROCHLORIDE 10 MG/1
10 TABLET, FILM COATED ORAL EVERY 8 HOURS SCHEDULED
Qty: 90 TABLET | Refills: 3 | DISCHARGE
Start: 2022-01-01

## 2022-01-01 RX ORDER — SODIUM CHLORIDE 0.9 % (FLUSH) 0.9 %
5-40 SYRINGE (ML) INJECTION EVERY 12 HOURS SCHEDULED
Status: DISCONTINUED | OUTPATIENT
Start: 2022-01-01 | End: 2022-01-01 | Stop reason: HOSPADM

## 2022-01-01 RX ORDER — SODIUM CHLORIDE 9 MG/ML
INJECTION, SOLUTION INTRAVENOUS
Status: DISPENSED
Start: 2022-01-01 | End: 2022-01-01

## 2022-01-01 RX ORDER — ACETAMINOPHEN 650 MG/1
650 SUPPOSITORY RECTAL EVERY 6 HOURS PRN
Status: DISCONTINUED | OUTPATIENT
Start: 2022-01-01 | End: 2022-01-01 | Stop reason: HOSPADM

## 2022-01-01 RX ORDER — CEFDINIR 300 MG/1
300 CAPSULE ORAL EVERY 12 HOURS SCHEDULED
Status: DISCONTINUED | OUTPATIENT
Start: 2022-01-01 | End: 2022-01-01

## 2022-01-01 RX ORDER — PROMETHAZINE HYDROCHLORIDE 25 MG/ML
25 INJECTION, SOLUTION INTRAMUSCULAR; INTRAVENOUS ONCE
Status: COMPLETED | OUTPATIENT
Start: 2022-01-01 | End: 2022-01-01

## 2022-01-01 RX ORDER — SODIUM CHLORIDE 0.9 % (FLUSH) 0.9 %
5-40 SYRINGE (ML) INJECTION PRN
Status: DISCONTINUED | OUTPATIENT
Start: 2022-01-01 | End: 2022-01-01 | Stop reason: HOSPADM

## 2022-01-01 RX ORDER — METOPROLOL TARTRATE 5 MG/5ML
2.5 INJECTION INTRAVENOUS EVERY 8 HOURS
Status: DISCONTINUED | OUTPATIENT
Start: 2022-01-01 | End: 2022-01-01

## 2022-01-01 RX ORDER — HYDRALAZINE HYDROCHLORIDE 10 MG/1
10 TABLET, FILM COATED ORAL EVERY 8 HOURS SCHEDULED
Status: DISCONTINUED | OUTPATIENT
Start: 2022-01-01 | End: 2022-01-01 | Stop reason: HOSPADM

## 2022-01-01 RX ORDER — METRONIDAZOLE 500 MG/1
500 TABLET ORAL EVERY 8 HOURS SCHEDULED
Status: DISCONTINUED | OUTPATIENT
Start: 2022-01-01 | End: 2022-01-01

## 2022-01-01 RX ORDER — METOPROLOL SUCCINATE 50 MG/1
50 TABLET, EXTENDED RELEASE ORAL DAILY
Status: DISCONTINUED | OUTPATIENT
Start: 2022-01-01 | End: 2022-01-01

## 2022-01-01 RX ORDER — ISOSORBIDE MONONITRATE 30 MG/1
30 TABLET, EXTENDED RELEASE ORAL DAILY
Qty: 30 TABLET | Refills: 3 | DISCHARGE
Start: 2022-01-01

## 2022-01-01 RX ORDER — METRONIDAZOLE 500 MG/100ML
500 INJECTION, SOLUTION INTRAVENOUS EVERY 8 HOURS
Status: DISCONTINUED | OUTPATIENT
Start: 2022-01-01 | End: 2022-01-01

## 2022-01-01 RX ORDER — METOPROLOL TARTRATE 5 MG/5ML
5 INJECTION INTRAVENOUS EVERY 5 MIN PRN
Status: DISCONTINUED | OUTPATIENT
Start: 2022-01-01 | End: 2022-01-01 | Stop reason: HOSPADM

## 2022-01-01 RX ORDER — BUDESONIDE 0.5 MG/2ML
0.5 INHALANT ORAL
Status: DISCONTINUED | OUTPATIENT
Start: 2022-01-01 | End: 2022-01-01 | Stop reason: HOSPADM

## 2022-01-01 RX ORDER — ONDANSETRON 2 MG/ML
INJECTION INTRAMUSCULAR; INTRAVENOUS
Status: DISPENSED
Start: 2022-01-01 | End: 2022-01-01

## 2022-01-01 RX ORDER — ENOXAPARIN SODIUM 100 MG/ML
40 INJECTION SUBCUTANEOUS DAILY
Status: DISCONTINUED | OUTPATIENT
Start: 2022-01-01 | End: 2022-01-01

## 2022-01-01 RX ORDER — POLYETHYLENE GLYCOL 3350 17 G/17G
17 POWDER, FOR SOLUTION ORAL 2 TIMES DAILY
Qty: 527 G | Refills: 1 | DISCHARGE
Start: 2022-01-01 | End: 2022-11-27

## 2022-01-01 RX ORDER — PANTOPRAZOLE SODIUM 40 MG/10ML
40 INJECTION, POWDER, LYOPHILIZED, FOR SOLUTION INTRAVENOUS 2 TIMES DAILY
Status: DISCONTINUED | OUTPATIENT
Start: 2022-01-01 | End: 2022-01-01

## 2022-01-01 RX ORDER — SENNA AND DOCUSATE SODIUM 50; 8.6 MG/1; MG/1
2 TABLET, FILM COATED ORAL 2 TIMES DAILY
DISCHARGE
Start: 2022-01-01

## 2022-01-01 RX ORDER — GUAIFENESIN 600 MG/1
600 TABLET, EXTENDED RELEASE ORAL 2 TIMES DAILY
Status: DISCONTINUED | OUTPATIENT
Start: 2022-01-01 | End: 2022-01-01 | Stop reason: HOSPADM

## 2022-01-01 RX ORDER — LANOLIN ALCOHOL/MO/W.PET/CERES
1000 CREAM (GRAM) TOPICAL DAILY
Status: DISCONTINUED | OUTPATIENT
Start: 2022-01-01 | End: 2022-01-01 | Stop reason: HOSPADM

## 2022-01-01 RX ORDER — POLYETHYLENE GLYCOL 3350 17 G/17G
17 POWDER, FOR SOLUTION ORAL 2 TIMES DAILY
Status: DISCONTINUED | OUTPATIENT
Start: 2022-01-01 | End: 2022-01-01 | Stop reason: HOSPADM

## 2022-01-01 RX ORDER — BUDESONIDE 0.5 MG/2ML
0.5 INHALANT ORAL
Qty: 60 EACH | Refills: 3 | DISCHARGE
Start: 2022-01-01

## 2022-01-01 RX ORDER — SODIUM CHLORIDE AND POTASSIUM CHLORIDE 300; 900 MG/100ML; MG/100ML
INJECTION, SOLUTION INTRAVENOUS CONTINUOUS
Status: DISCONTINUED | OUTPATIENT
Start: 2022-01-01 | End: 2022-01-01

## 2022-01-01 RX ORDER — DEXTROSE AND SODIUM CHLORIDE 5; .45 G/100ML; G/100ML
INJECTION, SOLUTION INTRAVENOUS CONTINUOUS
Status: DISCONTINUED | OUTPATIENT
Start: 2022-01-01 | End: 2022-01-01 | Stop reason: HOSPADM

## 2022-01-01 RX ORDER — ISOSORBIDE MONONITRATE 30 MG/1
30 TABLET, EXTENDED RELEASE ORAL DAILY
Status: DISCONTINUED | OUTPATIENT
Start: 2022-01-01 | End: 2022-01-01 | Stop reason: HOSPADM

## 2022-01-01 RX ORDER — ASPIRIN 81 MG/1
81 TABLET ORAL DAILY
Status: DISCONTINUED | OUTPATIENT
Start: 2022-01-01 | End: 2022-01-01

## 2022-01-01 RX ORDER — 0.9 % SODIUM CHLORIDE 0.9 %
500 INTRAVENOUS SOLUTION INTRAVENOUS ONCE
Status: COMPLETED | OUTPATIENT
Start: 2022-01-01 | End: 2022-01-01

## 2022-01-01 RX ORDER — IPRATROPIUM BROMIDE AND ALBUTEROL SULFATE 2.5; .5 MG/3ML; MG/3ML
3 SOLUTION RESPIRATORY (INHALATION)
Qty: 360 ML | DISCHARGE
Start: 2022-01-01

## 2022-01-01 RX ORDER — METOPROLOL TARTRATE 5 MG/5ML
5 INJECTION INTRAVENOUS ONCE
Status: COMPLETED | OUTPATIENT
Start: 2022-01-01 | End: 2022-01-01

## 2022-01-01 RX ORDER — ARFORMOTEROL TARTRATE 15 UG/2ML
15 SOLUTION RESPIRATORY (INHALATION)
Qty: 120 ML | Refills: 3 | DISCHARGE
Start: 2022-01-01

## 2022-01-01 RX ORDER — ONDANSETRON 2 MG/ML
4 INJECTION INTRAMUSCULAR; INTRAVENOUS EVERY 6 HOURS PRN
Status: DISCONTINUED | OUTPATIENT
Start: 2022-01-01 | End: 2022-01-01 | Stop reason: HOSPADM

## 2022-01-01 RX ORDER — MAGNESIUM SULFATE 1 G/100ML
1000 INJECTION INTRAVENOUS PRN
Status: DISCONTINUED | OUTPATIENT
Start: 2022-01-01 | End: 2022-01-01 | Stop reason: HOSPADM

## 2022-01-01 RX ORDER — FENTANYL CITRATE 0.05 MG/ML
25 INJECTION, SOLUTION INTRAMUSCULAR; INTRAVENOUS EVERY 4 HOURS PRN
Status: DISCONTINUED | OUTPATIENT
Start: 2022-01-01 | End: 2022-01-01 | Stop reason: HOSPADM

## 2022-01-01 RX ORDER — METOPROLOL SUCCINATE 50 MG/1
50 TABLET, EXTENDED RELEASE ORAL 2 TIMES DAILY
Qty: 30 TABLET | Refills: 3 | DISCHARGE
Start: 2022-01-01

## 2022-01-01 RX ORDER — HEPARIN SODIUM (PORCINE) LOCK FLUSH IV SOLN 100 UNIT/ML 100 UNIT/ML
1 SOLUTION INTRAVENOUS EVERY 12 HOURS SCHEDULED
Status: DISCONTINUED | OUTPATIENT
Start: 2022-01-01 | End: 2022-01-01 | Stop reason: HOSPADM

## 2022-01-01 RX ORDER — SENNA AND DOCUSATE SODIUM 50; 8.6 MG/1; MG/1
2 TABLET, FILM COATED ORAL 2 TIMES DAILY
Status: DISCONTINUED | OUTPATIENT
Start: 2022-01-01 | End: 2022-01-01 | Stop reason: HOSPADM

## 2022-01-01 RX ORDER — ONDANSETRON 4 MG/1
4 TABLET, ORALLY DISINTEGRATING ORAL EVERY 8 HOURS PRN
Status: DISCONTINUED | OUTPATIENT
Start: 2022-01-01 | End: 2022-01-01 | Stop reason: HOSPADM

## 2022-01-01 RX ORDER — MECOBALAMIN 5000 MCG
5 TABLET,DISINTEGRATING ORAL NIGHTLY
Status: DISCONTINUED | OUTPATIENT
Start: 2022-01-01 | End: 2022-01-01

## 2022-01-01 RX ORDER — ACETAMINOPHEN 325 MG/1
650 TABLET ORAL EVERY 6 HOURS PRN
Status: DISCONTINUED | OUTPATIENT
Start: 2022-01-01 | End: 2022-01-01 | Stop reason: HOSPADM

## 2022-01-01 RX ORDER — NICOTINE 21 MG/24HR
1 PATCH, TRANSDERMAL 24 HOURS TRANSDERMAL DAILY
Status: DISCONTINUED | OUTPATIENT
Start: 2022-01-01 | End: 2022-01-01

## 2022-01-01 RX ORDER — LACTULOSE 10 G/15ML
20 SOLUTION ORAL 2 TIMES DAILY
Refills: 1 | DISCHARGE
Start: 2022-01-01

## 2022-01-01 RX ORDER — IPRATROPIUM BROMIDE AND ALBUTEROL SULFATE 2.5; .5 MG/3ML; MG/3ML
1 SOLUTION RESPIRATORY (INHALATION)
Status: DISCONTINUED | OUTPATIENT
Start: 2022-01-01 | End: 2022-01-01 | Stop reason: HOSPADM

## 2022-01-01 RX ORDER — LACTOBACILLUS RHAMNOSUS GG 10B CELL
1 CAPSULE ORAL EVERY 12 HOURS
DISCHARGE
Start: 2022-01-01

## 2022-01-01 RX ORDER — PANTOPRAZOLE SODIUM 40 MG/1
40 TABLET, DELAYED RELEASE ORAL
Status: DISCONTINUED | OUTPATIENT
Start: 2022-01-01 | End: 2022-01-01 | Stop reason: HOSPADM

## 2022-01-01 RX ORDER — BISACODYL 5 MG/1
5 TABLET, DELAYED RELEASE ORAL DAILY PRN
DISCHARGE
Start: 2022-01-01

## 2022-01-01 RX ORDER — BISACODYL 5 MG/1
5 TABLET, DELAYED RELEASE ORAL DAILY PRN
Status: DISCONTINUED | OUTPATIENT
Start: 2022-01-01 | End: 2022-01-01 | Stop reason: HOSPADM

## 2022-01-01 RX ORDER — AMOXICILLIN AND CLAVULANATE POTASSIUM 500; 125 MG/1; MG/1
1 TABLET, FILM COATED ORAL EVERY 12 HOURS SCHEDULED
Qty: 20 TABLET | Refills: 0 | Status: SHIPPED | OUTPATIENT
Start: 2022-01-01 | End: 2022-01-01 | Stop reason: SDUPTHER

## 2022-01-01 RX ORDER — POTASSIUM CHLORIDE 7.45 MG/ML
10 INJECTION INTRAVENOUS PRN
Status: DISCONTINUED | OUTPATIENT
Start: 2022-01-01 | End: 2022-01-01 | Stop reason: HOSPADM

## 2022-01-01 RX ORDER — LACTOBACILLUS RHAMNOSUS GG 10B CELL
1 CAPSULE ORAL EVERY 12 HOURS
Status: DISCONTINUED | OUTPATIENT
Start: 2022-01-01 | End: 2022-01-01 | Stop reason: HOSPADM

## 2022-01-01 RX ORDER — ENOXAPARIN SODIUM 100 MG/ML
1 INJECTION SUBCUTANEOUS DAILY
Status: DISCONTINUED | OUTPATIENT
Start: 2022-01-01 | End: 2022-01-01

## 2022-01-01 RX ORDER — ARFORMOTEROL TARTRATE 15 UG/2ML
15 SOLUTION RESPIRATORY (INHALATION)
Status: DISCONTINUED | OUTPATIENT
Start: 2022-01-01 | End: 2022-01-01 | Stop reason: HOSPADM

## 2022-01-01 RX ORDER — DIGOXIN 0.25 MG/ML
125 INJECTION INTRAMUSCULAR; INTRAVENOUS ONCE
Status: COMPLETED | OUTPATIENT
Start: 2022-01-01 | End: 2022-01-01

## 2022-01-01 RX ADMIN — Medication 10 ML: at 20:16

## 2022-01-01 RX ADMIN — IPRATROPIUM BROMIDE AND ALBUTEROL SULFATE 1 AMPULE: .5; 2.5 SOLUTION RESPIRATORY (INHALATION) at 04:50

## 2022-01-01 RX ADMIN — POTASSIUM CHLORIDE: 2 INJECTION, SOLUTION, CONCENTRATE INTRAVENOUS at 11:44

## 2022-01-01 RX ADMIN — POTASSIUM CHLORIDE AND SODIUM CHLORIDE: 900; 300 INJECTION, SOLUTION INTRAVENOUS at 15:06

## 2022-01-01 RX ADMIN — PANTOPRAZOLE SODIUM 40 MG: 40 TABLET, DELAYED RELEASE ORAL at 17:19

## 2022-01-01 RX ADMIN — ACETAMINOPHEN 650 MG: 325 TABLET ORAL at 07:37

## 2022-01-01 RX ADMIN — CEFDINIR 300 MG: 300 CAPSULE ORAL at 15:03

## 2022-01-01 RX ADMIN — SENNOSIDES AND DOCUSATE SODIUM 2 TABLET: 50; 8.6 TABLET ORAL at 20:09

## 2022-01-01 RX ADMIN — Medication 10 ML: at 08:26

## 2022-01-01 RX ADMIN — ARFORMOTEROL TARTRATE 15 MCG: 15 SOLUTION RESPIRATORY (INHALATION) at 16:59

## 2022-01-01 RX ADMIN — SODIUM CHLORIDE: 9 INJECTION, SOLUTION INTRAVENOUS at 15:00

## 2022-01-01 RX ADMIN — SODIUM CHLORIDE 8 MG/HR: 9 INJECTION, SOLUTION INTRAVENOUS at 19:40

## 2022-01-01 RX ADMIN — ACETAMINOPHEN 650 MG: 325 TABLET ORAL at 15:21

## 2022-01-01 RX ADMIN — DEXTROSE AND SODIUM CHLORIDE: 5; 450 INJECTION, SOLUTION INTRAVENOUS at 01:29

## 2022-01-01 RX ADMIN — METRONIDAZOLE 500 MG: 500 INJECTION, SOLUTION INTRAVENOUS at 02:38

## 2022-01-01 RX ADMIN — ARFORMOTEROL TARTRATE 15 MCG: 15 SOLUTION RESPIRATORY (INHALATION) at 17:27

## 2022-01-01 RX ADMIN — BUDESONIDE 500 MCG: 0.5 INHALANT RESPIRATORY (INHALATION) at 08:48

## 2022-01-01 RX ADMIN — IPRATROPIUM BROMIDE AND ALBUTEROL SULFATE 1 AMPULE: .5; 2.5 SOLUTION RESPIRATORY (INHALATION) at 06:43

## 2022-01-01 RX ADMIN — SENNOSIDES AND DOCUSATE SODIUM 2 TABLET: 50; 8.6 TABLET ORAL at 19:44

## 2022-01-01 RX ADMIN — FENTANYL CITRATE 25 MCG: 50 INJECTION INTRAMUSCULAR; INTRAVENOUS at 11:40

## 2022-01-01 RX ADMIN — METRONIDAZOLE 500 MG: 500 TABLET ORAL at 15:03

## 2022-01-01 RX ADMIN — CYANOCOBALAMIN TAB 1000 MCG 1000 MCG: 1000 TAB at 08:59

## 2022-01-01 RX ADMIN — ACETAMINOPHEN 650 MG: 325 TABLET ORAL at 17:19

## 2022-01-01 RX ADMIN — ARFORMOTEROL TARTRATE 15 MCG: 15 SOLUTION RESPIRATORY (INHALATION) at 17:11

## 2022-01-01 RX ADMIN — Medication 5 MG: at 21:40

## 2022-01-01 RX ADMIN — METRONIDAZOLE 500 MG: 500 INJECTION, SOLUTION INTRAVENOUS at 17:16

## 2022-01-01 RX ADMIN — IPRATROPIUM BROMIDE AND ALBUTEROL SULFATE 1 AMPULE: .5; 2.5 SOLUTION RESPIRATORY (INHALATION) at 15:13

## 2022-01-01 RX ADMIN — BUDESONIDE 500 MCG: 0.5 INHALANT RESPIRATORY (INHALATION) at 07:13

## 2022-01-01 RX ADMIN — CYANOCOBALAMIN TAB 1000 MCG 1000 MCG: 1000 TAB at 09:54

## 2022-01-01 RX ADMIN — POTASSIUM CHLORIDE: 2 INJECTION, SOLUTION, CONCENTRATE INTRAVENOUS at 02:41

## 2022-01-01 RX ADMIN — ARFORMOTEROL TARTRATE 15 MCG: 15 SOLUTION RESPIRATORY (INHALATION) at 04:50

## 2022-01-01 RX ADMIN — ARFORMOTEROL TARTRATE 15 MCG: 15 SOLUTION RESPIRATORY (INHALATION) at 08:47

## 2022-01-01 RX ADMIN — LACTULOSE 20 G: 20 SOLUTION ORAL at 21:41

## 2022-01-01 RX ADMIN — CEFTRIAXONE SODIUM 1000 MG: 1 INJECTION, POWDER, FOR SOLUTION INTRAMUSCULAR; INTRAVENOUS at 14:06

## 2022-01-01 RX ADMIN — LACTULOSE 20 G: 20 SOLUTION ORAL at 08:25

## 2022-01-01 RX ADMIN — AMOXICILLIN AND CLAVULANATE POTASSIUM 1 TABLET: 500; 125 TABLET, FILM COATED ORAL at 09:33

## 2022-01-01 RX ADMIN — BUDESONIDE 500 MCG: 0.5 INHALANT RESPIRATORY (INHALATION) at 17:35

## 2022-01-01 RX ADMIN — METOPROLOL SUCCINATE 50 MG: 50 TABLET, EXTENDED RELEASE ORAL at 20:09

## 2022-01-01 RX ADMIN — METRONIDAZOLE 500 MG: 500 INJECTION, SOLUTION INTRAVENOUS at 13:57

## 2022-01-01 RX ADMIN — METOPROLOL SUCCINATE 50 MG: 50 TABLET, EXTENDED RELEASE ORAL at 21:41

## 2022-01-01 RX ADMIN — AMOXICILLIN AND CLAVULANATE POTASSIUM 1 TABLET: 500; 125 TABLET, FILM COATED ORAL at 11:38

## 2022-01-01 RX ADMIN — POLYETHYLENE GLYCOL 3350 17 G: 17 POWDER, FOR SOLUTION ORAL at 13:47

## 2022-01-01 RX ADMIN — CEFTRIAXONE SODIUM 1000 MG: 1 INJECTION, POWDER, FOR SOLUTION INTRAMUSCULAR; INTRAVENOUS at 08:55

## 2022-01-01 RX ADMIN — POTASSIUM CHLORIDE: 2 INJECTION, SOLUTION, CONCENTRATE INTRAVENOUS at 16:23

## 2022-01-01 RX ADMIN — Medication 10 ML: at 08:56

## 2022-01-01 RX ADMIN — METOPROLOL SUCCINATE 50 MG: 50 TABLET, EXTENDED RELEASE ORAL at 21:18

## 2022-01-01 RX ADMIN — POTASSIUM CHLORIDE AND SODIUM CHLORIDE: 900; 300 INJECTION, SOLUTION INTRAVENOUS at 13:32

## 2022-01-01 RX ADMIN — APIXABAN 5 MG: 5 TABLET, FILM COATED ORAL at 08:58

## 2022-01-01 RX ADMIN — IPRATROPIUM BROMIDE AND ALBUTEROL SULFATE 1 AMPULE: .5; 2.5 SOLUTION RESPIRATORY (INHALATION) at 17:35

## 2022-01-01 RX ADMIN — ASPIRIN 81 MG: 81 TABLET, COATED ORAL at 08:26

## 2022-01-01 RX ADMIN — ACETAMINOPHEN 650 MG: 325 TABLET ORAL at 21:19

## 2022-01-01 RX ADMIN — ACETAMINOPHEN 650 MG: 325 TABLET ORAL at 08:57

## 2022-01-01 RX ADMIN — METOPROLOL TARTRATE 2.5 MG: 5 INJECTION, SOLUTION INTRAVENOUS at 13:26

## 2022-01-01 RX ADMIN — APIXABAN 5 MG: 5 TABLET, FILM COATED ORAL at 09:00

## 2022-01-01 RX ADMIN — PANTOPRAZOLE SODIUM 40 MG: 40 TABLET, DELAYED RELEASE ORAL at 19:22

## 2022-01-01 RX ADMIN — METRONIDAZOLE 500 MG: 500 TABLET ORAL at 04:30

## 2022-01-01 RX ADMIN — METOPROLOL SUCCINATE 50 MG: 50 TABLET, EXTENDED RELEASE ORAL at 09:24

## 2022-01-01 RX ADMIN — ARFORMOTEROL TARTRATE 15 MCG: 15 SOLUTION RESPIRATORY (INHALATION) at 18:40

## 2022-01-01 RX ADMIN — Medication 10 ML: at 21:04

## 2022-01-01 RX ADMIN — PANTOPRAZOLE SODIUM 40 MG: 40 INJECTION, POWDER, FOR SOLUTION INTRAVENOUS at 21:40

## 2022-01-01 RX ADMIN — Medication 5 MG: at 20:08

## 2022-01-01 RX ADMIN — POTASSIUM BICARBONATE 40 MEQ: 782 TABLET, EFFERVESCENT ORAL at 08:27

## 2022-01-01 RX ADMIN — DEXTROSE AND SODIUM CHLORIDE: 5; 450 INJECTION, SOLUTION INTRAVENOUS at 19:18

## 2022-01-01 RX ADMIN — IPRATROPIUM BROMIDE AND ALBUTEROL SULFATE 1 AMPULE: .5; 2.5 SOLUTION RESPIRATORY (INHALATION) at 17:07

## 2022-01-01 RX ADMIN — CEFDINIR 300 MG: 300 CAPSULE ORAL at 20:08

## 2022-01-01 RX ADMIN — IPRATROPIUM BROMIDE AND ALBUTEROL SULFATE 1 AMPULE: .5; 2.5 SOLUTION RESPIRATORY (INHALATION) at 13:33

## 2022-01-01 RX ADMIN — IPRATROPIUM BROMIDE AND ALBUTEROL SULFATE 1 AMPULE: .5; 2.5 SOLUTION RESPIRATORY (INHALATION) at 17:27

## 2022-01-01 RX ADMIN — ARFORMOTEROL TARTRATE 15 MCG: 15 SOLUTION RESPIRATORY (INHALATION) at 07:14

## 2022-01-01 RX ADMIN — VANCOMYCIN HYDROCHLORIDE 1500 MG: 10 INJECTION, POWDER, LYOPHILIZED, FOR SOLUTION INTRAVENOUS at 09:18

## 2022-01-01 RX ADMIN — ENOXAPARIN SODIUM 40 MG: 100 INJECTION SUBCUTANEOUS at 13:48

## 2022-01-01 RX ADMIN — PIPERACILLIN AND TAZOBACTAM 3375 MG: 3; .375 INJECTION, POWDER, FOR SOLUTION INTRAVENOUS at 09:18

## 2022-01-01 RX ADMIN — PANTOPRAZOLE SODIUM 40 MG: 40 TABLET, DELAYED RELEASE ORAL at 07:48

## 2022-01-01 RX ADMIN — POLYETHYLENE GLYCOL 3350 17 G: 17 POWDER, FOR SOLUTION ORAL at 19:44

## 2022-01-01 RX ADMIN — ENOXAPARIN SODIUM 80 MG: 100 INJECTION SUBCUTANEOUS at 08:55

## 2022-01-01 RX ADMIN — POLYETHYLENE GLYCOL 3350 17 G: 17 POWDER, FOR SOLUTION ORAL at 21:40

## 2022-01-01 RX ADMIN — IPRATROPIUM BROMIDE AND ALBUTEROL SULFATE 1 AMPULE: .5; 2.5 SOLUTION RESPIRATORY (INHALATION) at 07:13

## 2022-01-01 RX ADMIN — PANTOPRAZOLE SODIUM 40 MG: 40 TABLET, DELAYED RELEASE ORAL at 05:16

## 2022-01-01 RX ADMIN — ISOSORBIDE MONONITRATE 30 MG: 30 TABLET, EXTENDED RELEASE ORAL at 09:23

## 2022-01-01 RX ADMIN — DEXTROSE AND SODIUM CHLORIDE: 5; 450 INJECTION, SOLUTION INTRAVENOUS at 15:06

## 2022-01-01 RX ADMIN — ONDANSETRON 4 MG: 2 INJECTION INTRAMUSCULAR; INTRAVENOUS at 20:09

## 2022-01-01 RX ADMIN — ARFORMOTEROL TARTRATE 15 MCG: 15 SOLUTION RESPIRATORY (INHALATION) at 17:07

## 2022-01-01 RX ADMIN — LACTULOSE 20 G: 20 SOLUTION ORAL at 13:48

## 2022-01-01 RX ADMIN — IPRATROPIUM BROMIDE AND ALBUTEROL SULFATE 1 AMPULE: .5; 2.5 SOLUTION RESPIRATORY (INHALATION) at 14:09

## 2022-01-01 RX ADMIN — IPRATROPIUM BROMIDE AND ALBUTEROL SULFATE 1 AMPULE: .5; 2.5 SOLUTION RESPIRATORY (INHALATION) at 17:11

## 2022-01-01 RX ADMIN — HYDRALAZINE HYDROCHLORIDE 10 MG: 10 TABLET, FILM COATED ORAL at 22:36

## 2022-01-01 RX ADMIN — CYANOCOBALAMIN TAB 1000 MCG 1000 MCG: 1000 TAB at 13:46

## 2022-01-01 RX ADMIN — SODIUM CHLORIDE 8 MG/HR: 9 INJECTION, SOLUTION INTRAVENOUS at 09:16

## 2022-01-01 RX ADMIN — SODIUM CHLORIDE 8 MG/HR: 9 INJECTION, SOLUTION INTRAVENOUS at 03:44

## 2022-01-01 RX ADMIN — ISOSORBIDE MONONITRATE 30 MG: 30 TABLET, EXTENDED RELEASE ORAL at 20:39

## 2022-01-01 RX ADMIN — SODIUM CHLORIDE 500 ML: 9 INJECTION, SOLUTION INTRAVENOUS at 00:19

## 2022-01-01 RX ADMIN — ANTI-FUNGAL POWDER MICONAZOLE NITRATE TALC FREE: 1.42 POWDER TOPICAL at 10:01

## 2022-01-01 RX ADMIN — IPRATROPIUM BROMIDE AND ALBUTEROL SULFATE 1 AMPULE: .5; 2.5 SOLUTION RESPIRATORY (INHALATION) at 06:45

## 2022-01-01 RX ADMIN — IPRATROPIUM BROMIDE AND ALBUTEROL SULFATE 1 AMPULE: .5; 2.5 SOLUTION RESPIRATORY (INHALATION) at 05:50

## 2022-01-01 RX ADMIN — METOPROLOL TARTRATE 5 MG: 5 INJECTION, SOLUTION INTRAVENOUS at 02:02

## 2022-01-01 RX ADMIN — BUDESONIDE 500 MCG: 0.5 INHALANT RESPIRATORY (INHALATION) at 04:51

## 2022-01-01 RX ADMIN — ARFORMOTEROL TARTRATE 15 MCG: 15 SOLUTION RESPIRATORY (INHALATION) at 06:45

## 2022-01-01 RX ADMIN — ISOSORBIDE MONONITRATE 30 MG: 30 TABLET, EXTENDED RELEASE ORAL at 09:54

## 2022-01-01 RX ADMIN — PANTOPRAZOLE SODIUM 40 MG: 40 TABLET, DELAYED RELEASE ORAL at 06:17

## 2022-01-01 RX ADMIN — METOPROLOL TARTRATE 5 MG: 5 INJECTION, SOLUTION INTRAVENOUS at 20:30

## 2022-01-01 RX ADMIN — HYDRALAZINE HYDROCHLORIDE 10 MG: 10 TABLET, FILM COATED ORAL at 21:19

## 2022-01-01 RX ADMIN — ISOSORBIDE MONONITRATE 30 MG: 30 TABLET, EXTENDED RELEASE ORAL at 09:00

## 2022-01-01 RX ADMIN — BUDESONIDE 500 MCG: 0.5 INHALANT RESPIRATORY (INHALATION) at 17:27

## 2022-01-01 RX ADMIN — SENNOSIDES AND DOCUSATE SODIUM 2 TABLET: 50; 8.6 TABLET ORAL at 08:26

## 2022-01-01 RX ADMIN — ARFORMOTEROL TARTRATE 15 MCG: 15 SOLUTION RESPIRATORY (INHALATION) at 17:35

## 2022-01-01 RX ADMIN — LACTULOSE 20 G: 20 SOLUTION ORAL at 19:44

## 2022-01-01 RX ADMIN — SODIUM CHLORIDE, PRESERVATIVE FREE 10 ML: 5 INJECTION INTRAVENOUS at 22:37

## 2022-01-01 RX ADMIN — METRONIDAZOLE 500 MG: 500 TABLET ORAL at 20:09

## 2022-01-01 RX ADMIN — BUDESONIDE 500 MCG: 0.5 INHALANT RESPIRATORY (INHALATION) at 17:12

## 2022-01-01 RX ADMIN — METOPROLOL SUCCINATE 50 MG: 50 TABLET, EXTENDED RELEASE ORAL at 22:36

## 2022-01-01 RX ADMIN — ACETAMINOPHEN 650 MG: 325 TABLET ORAL at 20:47

## 2022-01-01 RX ADMIN — BUDESONIDE 500 MCG: 0.5 INHALANT RESPIRATORY (INHALATION) at 17:07

## 2022-01-01 RX ADMIN — ENOXAPARIN SODIUM 80 MG: 100 INJECTION SUBCUTANEOUS at 09:19

## 2022-01-01 RX ADMIN — IPRATROPIUM BROMIDE AND ALBUTEROL SULFATE 1 AMPULE: .5; 2.5 SOLUTION RESPIRATORY (INHALATION) at 10:11

## 2022-01-01 RX ADMIN — ARFORMOTEROL TARTRATE 15 MCG: 15 SOLUTION RESPIRATORY (INHALATION) at 06:43

## 2022-01-01 RX ADMIN — PANTOPRAZOLE SODIUM 40 MG: 40 TABLET, DELAYED RELEASE ORAL at 17:14

## 2022-01-01 RX ADMIN — IPRATROPIUM BROMIDE AND ALBUTEROL SULFATE 1 AMPULE: .5; 2.5 SOLUTION RESPIRATORY (INHALATION) at 14:10

## 2022-01-01 RX ADMIN — BUDESONIDE 500 MCG: 0.5 INHALANT RESPIRATORY (INHALATION) at 18:40

## 2022-01-01 RX ADMIN — CYANOCOBALAMIN TAB 1000 MCG 1000 MCG: 1000 TAB at 09:34

## 2022-01-01 RX ADMIN — LIDOCAINE HYDROCHLORIDE 5 ML: 10 INJECTION, SOLUTION INFILTRATION; PERINEURAL at 09:45

## 2022-01-01 RX ADMIN — IPRATROPIUM BROMIDE AND ALBUTEROL SULFATE 1 AMPULE: .5; 2.5 SOLUTION RESPIRATORY (INHALATION) at 10:02

## 2022-01-01 RX ADMIN — CEFTRIAXONE SODIUM 1000 MG: 1 INJECTION, POWDER, FOR SOLUTION INTRAMUSCULAR; INTRAVENOUS at 04:26

## 2022-01-01 RX ADMIN — ARFORMOTEROL TARTRATE 15 MCG: 15 SOLUTION RESPIRATORY (INHALATION) at 05:50

## 2022-01-01 RX ADMIN — APIXABAN 5 MG: 5 TABLET, FILM COATED ORAL at 09:23

## 2022-01-01 RX ADMIN — DEXTROSE AND SODIUM CHLORIDE: 5; 450 INJECTION, SOLUTION INTRAVENOUS at 14:04

## 2022-01-01 RX ADMIN — Medication 10 ML: at 13:40

## 2022-01-01 RX ADMIN — IPRATROPIUM BROMIDE AND ALBUTEROL SULFATE 1 AMPULE: .5; 2.5 SOLUTION RESPIRATORY (INHALATION) at 16:59

## 2022-01-01 RX ADMIN — POTASSIUM CHLORIDE AND SODIUM CHLORIDE: 900; 300 INJECTION, SOLUTION INTRAVENOUS at 02:44

## 2022-01-01 RX ADMIN — POTASSIUM CHLORIDE: 2 INJECTION, SOLUTION, CONCENTRATE INTRAVENOUS at 04:58

## 2022-01-01 RX ADMIN — POLYETHYLENE GLYCOL 3350 17 G: 17 POWDER, FOR SOLUTION ORAL at 20:09

## 2022-01-01 RX ADMIN — ENOXAPARIN SODIUM 40 MG: 100 INJECTION SUBCUTANEOUS at 08:25

## 2022-01-01 RX ADMIN — SENNOSIDES AND DOCUSATE SODIUM 2 TABLET: 50; 8.6 TABLET ORAL at 09:23

## 2022-01-01 RX ADMIN — METOPROLOL SUCCINATE 50 MG: 50 TABLET, EXTENDED RELEASE ORAL at 11:40

## 2022-01-01 RX ADMIN — IOPAMIDOL 80 ML: 755 INJECTION, SOLUTION INTRAVENOUS at 04:54

## 2022-01-01 RX ADMIN — IPRATROPIUM BROMIDE AND ALBUTEROL SULFATE 1 AMPULE: .5; 2.5 SOLUTION RESPIRATORY (INHALATION) at 18:41

## 2022-01-01 RX ADMIN — ONDANSETRON 4 MG: 2 INJECTION INTRAMUSCULAR; INTRAVENOUS at 09:53

## 2022-01-01 RX ADMIN — ANTI-FUNGAL POWDER MICONAZOLE NITRATE TALC FREE: 1.42 POWDER TOPICAL at 15:04

## 2022-01-01 RX ADMIN — IPRATROPIUM BROMIDE AND ALBUTEROL SULFATE 1 AMPULE: .5; 2.5 SOLUTION RESPIRATORY (INHALATION) at 13:45

## 2022-01-01 RX ADMIN — METOPROLOL TARTRATE 5 MG: 5 INJECTION, SOLUTION INTRAVENOUS at 03:40

## 2022-01-01 RX ADMIN — METRONIDAZOLE 500 MG: 500 INJECTION, SOLUTION INTRAVENOUS at 22:17

## 2022-01-01 RX ADMIN — METOPROLOL TARTRATE 5 MG: 5 INJECTION, SOLUTION INTRAVENOUS at 06:34

## 2022-01-01 RX ADMIN — METOPROLOL SUCCINATE 50 MG: 50 TABLET, EXTENDED RELEASE ORAL at 09:54

## 2022-01-01 RX ADMIN — IPRATROPIUM BROMIDE AND ALBUTEROL SULFATE 1 AMPULE: .5; 2.5 SOLUTION RESPIRATORY (INHALATION) at 14:26

## 2022-01-01 RX ADMIN — IPRATROPIUM BROMIDE AND ALBUTEROL SULFATE 1 AMPULE: .5; 2.5 SOLUTION RESPIRATORY (INHALATION) at 18:58

## 2022-01-01 RX ADMIN — Medication 5 MG: at 19:44

## 2022-01-01 RX ADMIN — PIPERACILLIN AND TAZOBACTAM 3375 MG: 3; .375 INJECTION, POWDER, FOR SOLUTION INTRAVENOUS at 06:34

## 2022-01-01 RX ADMIN — METOPROLOL SUCCINATE 50 MG: 50 TABLET, EXTENDED RELEASE ORAL at 19:44

## 2022-01-01 RX ADMIN — BUDESONIDE 500 MCG: 0.5 INHALANT RESPIRATORY (INHALATION) at 06:45

## 2022-01-01 RX ADMIN — IPRATROPIUM BROMIDE AND ALBUTEROL SULFATE 1 AMPULE: .5; 2.5 SOLUTION RESPIRATORY (INHALATION) at 11:50

## 2022-01-01 RX ADMIN — AMOXICILLIN AND CLAVULANATE POTASSIUM 1 TABLET: 500; 125 TABLET, FILM COATED ORAL at 21:35

## 2022-01-01 RX ADMIN — SENNOSIDES AND DOCUSATE SODIUM 2 TABLET: 50; 8.6 TABLET ORAL at 13:47

## 2022-01-01 RX ADMIN — SODIUM CHLORIDE, PRESERVATIVE FREE 10 ML: 5 INJECTION INTRAVENOUS at 21:17

## 2022-01-01 RX ADMIN — IPRATROPIUM BROMIDE AND ALBUTEROL SULFATE 1 AMPULE: .5; 2.5 SOLUTION RESPIRATORY (INHALATION) at 06:18

## 2022-01-01 RX ADMIN — SENNOSIDES AND DOCUSATE SODIUM 2 TABLET: 50; 8.6 TABLET ORAL at 09:54

## 2022-01-01 RX ADMIN — ASPIRIN 81 MG: 81 TABLET, COATED ORAL at 13:47

## 2022-01-01 RX ADMIN — BUDESONIDE 500 MCG: 0.5 INHALANT RESPIRATORY (INHALATION) at 06:43

## 2022-01-01 RX ADMIN — AMOXICILLIN AND CLAVULANATE POTASSIUM 1 TABLET: 500; 125 TABLET, FILM COATED ORAL at 21:57

## 2022-01-01 RX ADMIN — PERFLUTREN 1.65 MG: 6.52 INJECTION, SUSPENSION INTRAVENOUS at 08:43

## 2022-01-01 RX ADMIN — METRONIDAZOLE 500 MG: 500 INJECTION, SOLUTION INTRAVENOUS at 09:02

## 2022-01-01 RX ADMIN — BUDESONIDE 500 MCG: 0.5 INHALANT RESPIRATORY (INHALATION) at 18:58

## 2022-01-01 RX ADMIN — PIPERACILLIN AND TAZOBACTAM 3375 MG: 3; .375 INJECTION, POWDER, FOR SOLUTION INTRAVENOUS at 18:22

## 2022-01-01 RX ADMIN — PIPERACILLIN AND TAZOBACTAM 3375 MG: 3; .375 INJECTION, POWDER, FOR SOLUTION INTRAVENOUS at 20:47

## 2022-01-01 RX ADMIN — ARFORMOTEROL TARTRATE 15 MCG: 15 SOLUTION RESPIRATORY (INHALATION) at 06:18

## 2022-01-01 RX ADMIN — POTASSIUM CHLORIDE: 2 INJECTION, SOLUTION, CONCENTRATE INTRAVENOUS at 11:58

## 2022-01-01 RX ADMIN — METOPROLOL SUCCINATE 50 MG: 50 TABLET, EXTENDED RELEASE ORAL at 09:01

## 2022-01-01 RX ADMIN — ONDANSETRON 4 MG: 2 INJECTION INTRAMUSCULAR; INTRAVENOUS at 02:44

## 2022-01-01 RX ADMIN — BUDESONIDE 500 MCG: 0.5 INHALANT RESPIRATORY (INHALATION) at 05:50

## 2022-01-01 RX ADMIN — PROMETHAZINE HYDROCHLORIDE 25 MG: 25 INJECTION INTRAMUSCULAR; INTRAVENOUS at 06:26

## 2022-01-01 RX ADMIN — Medication 10 ML: at 21:40

## 2022-01-01 RX ADMIN — ONDANSETRON 4 MG: 2 INJECTION INTRAMUSCULAR; INTRAVENOUS at 21:41

## 2022-01-01 RX ADMIN — CYANOCOBALAMIN TAB 1000 MCG 1000 MCG: 1000 TAB at 08:25

## 2022-01-01 RX ADMIN — DIGOXIN 125 MCG: 0.25 INJECTION INTRAMUSCULAR; INTRAVENOUS at 18:12

## 2022-01-01 RX ADMIN — SENNOSIDES AND DOCUSATE SODIUM 2 TABLET: 50; 8.6 TABLET ORAL at 21:41

## 2022-01-01 RX ADMIN — BUDESONIDE 500 MCG: 0.5 INHALANT RESPIRATORY (INHALATION) at 16:59

## 2022-01-01 RX ADMIN — IPRATROPIUM BROMIDE AND ALBUTEROL SULFATE 1 AMPULE: .5; 2.5 SOLUTION RESPIRATORY (INHALATION) at 09:57

## 2022-01-01 RX ADMIN — POLYETHYLENE GLYCOL 3350 17 G: 17 POWDER, FOR SOLUTION ORAL at 08:29

## 2022-01-01 RX ADMIN — PANTOPRAZOLE SODIUM 40 MG: 40 INJECTION, POWDER, FOR SOLUTION INTRAVENOUS at 11:34

## 2022-01-01 RX ADMIN — SODIUM CHLORIDE, PRESERVATIVE FREE 20 ML: 5 INJECTION INTRAVENOUS at 20:30

## 2022-01-01 RX ADMIN — ARFORMOTEROL TARTRATE 15 MCG: 15 SOLUTION RESPIRATORY (INHALATION) at 18:58

## 2022-01-01 RX ADMIN — IPRATROPIUM BROMIDE AND ALBUTEROL SULFATE 1 AMPULE: .5; 2.5 SOLUTION RESPIRATORY (INHALATION) at 09:59

## 2022-01-01 RX ADMIN — BUDESONIDE 500 MCG: 0.5 INHALANT RESPIRATORY (INHALATION) at 06:18

## 2022-01-01 RX ADMIN — METRONIDAZOLE 500 MG: 500 INJECTION, SOLUTION INTRAVENOUS at 04:25

## 2022-01-01 ASSESSMENT — PAIN SCALES - GENERAL
PAINLEVEL_OUTOF10: 3
PAINLEVEL_OUTOF10: 4
PAINLEVEL_OUTOF10: 0
PAINLEVEL_OUTOF10: 2
PAINLEVEL_OUTOF10: 0
PAINLEVEL_OUTOF10: 4
PAINLEVEL_OUTOF10: 8
PAINLEVEL_OUTOF10: 0
PAINLEVEL_OUTOF10: 4
PAINLEVEL_OUTOF10: 3
PAINLEVEL_OUTOF10: 0
PAINLEVEL_OUTOF10: 0
PAINLEVEL_OUTOF10: 5
PAINLEVEL_OUTOF10: 0
PAINLEVEL_OUTOF10: 0
PAINLEVEL_OUTOF10: 6
PAINLEVEL_OUTOF10: 8
PAINLEVEL_OUTOF10: 0
PAINLEVEL_OUTOF10: 0
PAINLEVEL_OUTOF10: 2
PAINLEVEL_OUTOF10: 3
PAINLEVEL_OUTOF10: 0
PAINLEVEL_OUTOF10: 0

## 2022-01-01 ASSESSMENT — ENCOUNTER SYMPTOMS
VOMITING: 1
ABDOMINAL PAIN: 1
PHOTOPHOBIA: 0
SHORTNESS OF BREATH: 1
SINUS PAIN: 0
CHEST TIGHTNESS: 0
DIARRHEA: 0
COUGH: 0
CONSTIPATION: 1
RHINORRHEA: 0
SORE THROAT: 0
NAUSEA: 1
BACK PAIN: 0
BLOOD IN STOOL: 0
SHORTNESS OF BREATH: 0

## 2022-01-01 ASSESSMENT — PAIN DESCRIPTION - FREQUENCY
FREQUENCY: CONTINUOUS

## 2022-01-01 ASSESSMENT — PAIN DESCRIPTION - PAIN TYPE
TYPE: ACUTE PAIN
TYPE: CHRONIC PAIN

## 2022-01-01 ASSESSMENT — PAIN DESCRIPTION - LOCATION
LOCATION: GENERALIZED
LOCATION: ABDOMEN
LOCATION: HEAD
LOCATION: ABDOMEN
LOCATION: ABDOMEN

## 2022-01-01 ASSESSMENT — PAIN DESCRIPTION - DESCRIPTORS
DESCRIPTORS: ACHING
DESCRIPTORS: ACHING
DESCRIPTORS: ACHING;TENDER;SORE

## 2022-01-01 ASSESSMENT — PAIN - FUNCTIONAL ASSESSMENT
PAIN_FUNCTIONAL_ASSESSMENT: NONE - DENIES PAIN
PAIN_FUNCTIONAL_ASSESSMENT: PREVENTS OR INTERFERES SOME ACTIVE ACTIVITIES AND ADLS
PAIN_FUNCTIONAL_ASSESSMENT: 0-10
PAIN_FUNCTIONAL_ASSESSMENT: ACTIVITIES ARE NOT PREVENTED
PAIN_FUNCTIONAL_ASSESSMENT: PREVENTS OR INTERFERES SOME ACTIVE ACTIVITIES AND ADLS
PAIN_FUNCTIONAL_ASSESSMENT: NONE - DENIES PAIN

## 2022-01-01 ASSESSMENT — PAIN DESCRIPTION - ORIENTATION
ORIENTATION: RIGHT;LOWER
ORIENTATION: RIGHT
ORIENTATION: RIGHT;MID;LOWER
ORIENTATION: RIGHT;LOWER
ORIENTATION: RIGHT;LOWER

## 2022-01-01 ASSESSMENT — LIFESTYLE VARIABLES: SMOKING_STATUS: 0

## 2022-01-01 ASSESSMENT — PAIN SCALES - WONG BAKER
WONGBAKER_NUMERICALRESPONSE: 0
WONGBAKER_NUMERICALRESPONSE: 2
WONGBAKER_NUMERICALRESPONSE: 0

## 2022-07-05 ENCOUNTER — APPOINTMENT (OUTPATIENT)
Dept: GENERAL RADIOLOGY | Age: 77
End: 2022-07-05
Payer: MEDICARE

## 2022-07-05 ENCOUNTER — HOSPITAL ENCOUNTER (EMERGENCY)
Age: 77
Discharge: LEFT BEFORE DISCHARGE | End: 2022-07-05
Attending: EMERGENCY MEDICINE
Payer: MEDICARE

## 2022-07-05 ENCOUNTER — APPOINTMENT (OUTPATIENT)
Dept: CT IMAGING | Age: 77
End: 2022-07-05
Payer: MEDICARE

## 2022-07-05 VITALS
TEMPERATURE: 98.3 F | DIASTOLIC BLOOD PRESSURE: 85 MMHG | WEIGHT: 171 LBS | OXYGEN SATURATION: 96 % | HEIGHT: 66 IN | BODY MASS INDEX: 27.48 KG/M2 | SYSTOLIC BLOOD PRESSURE: 140 MMHG | RESPIRATION RATE: 18 BRPM | HEART RATE: 108 BPM

## 2022-07-05 DIAGNOSIS — I48.91 NEW ONSET ATRIAL FIBRILLATION (HCC): Primary | ICD-10-CM

## 2022-07-05 DIAGNOSIS — R06.02 SHORTNESS OF BREATH: ICD-10-CM

## 2022-07-05 DIAGNOSIS — R10.11 RIGHT UPPER QUADRANT ABDOMINAL PAIN: ICD-10-CM

## 2022-07-05 LAB
ALBUMIN SERPL-MCNC: 4.2 G/DL (ref 3.5–5.2)
ALP BLD-CCNC: 117 U/L (ref 35–104)
ALT SERPL-CCNC: 9 U/L (ref 0–32)
ANION GAP SERPL CALCULATED.3IONS-SCNC: 12 MMOL/L (ref 7–16)
AST SERPL-CCNC: 14 U/L (ref 0–31)
BASOPHILS ABSOLUTE: 0.05 E9/L (ref 0–0.2)
BASOPHILS RELATIVE PERCENT: 0.6 % (ref 0–2)
BILIRUB SERPL-MCNC: 0.6 MG/DL (ref 0–1.2)
BUN BLDV-MCNC: 23 MG/DL (ref 6–23)
CALCIUM SERPL-MCNC: 9 MG/DL (ref 8.6–10.2)
CHLORIDE BLD-SCNC: 100 MMOL/L (ref 98–107)
CO2: 31 MMOL/L (ref 22–29)
CREAT SERPL-MCNC: 1.1 MG/DL (ref 0.5–1)
EOSINOPHILS ABSOLUTE: 0.18 E9/L (ref 0.05–0.5)
EOSINOPHILS RELATIVE PERCENT: 2.2 % (ref 0–6)
GFR AFRICAN AMERICAN: 58
GFR NON-AFRICAN AMERICAN: 48 ML/MIN/1.73
GLUCOSE BLD-MCNC: 118 MG/DL (ref 74–99)
HCT VFR BLD CALC: 39.9 % (ref 34–48)
HEMOGLOBIN: 12.7 G/DL (ref 11.5–15.5)
IMMATURE GRANULOCYTES #: 0.03 E9/L
IMMATURE GRANULOCYTES %: 0.4 % (ref 0–5)
LIPASE: 17 U/L (ref 13–60)
LYMPHOCYTES ABSOLUTE: 1.98 E9/L (ref 1.5–4)
LYMPHOCYTES RELATIVE PERCENT: 24.5 % (ref 20–42)
MAGNESIUM: 2 MG/DL (ref 1.6–2.6)
MCH RBC QN AUTO: 27.7 PG (ref 26–35)
MCHC RBC AUTO-ENTMCNC: 31.8 % (ref 32–34.5)
MCV RBC AUTO: 87.1 FL (ref 80–99.9)
MONOCYTES ABSOLUTE: 0.83 E9/L (ref 0.1–0.95)
MONOCYTES RELATIVE PERCENT: 10.3 % (ref 2–12)
NEUTROPHILS ABSOLUTE: 5.01 E9/L (ref 1.8–7.3)
NEUTROPHILS RELATIVE PERCENT: 62 % (ref 43–80)
PDW BLD-RTO: 16 FL (ref 11.5–15)
PLATELET # BLD: 373 E9/L (ref 130–450)
PMV BLD AUTO: 9.8 FL (ref 7–12)
POTASSIUM SERPL-SCNC: 3.3 MMOL/L (ref 3.5–5)
PRO-BNP: ABNORMAL PG/ML (ref 0–450)
RBC # BLD: 4.58 E12/L (ref 3.5–5.5)
SARS-COV-2, NAAT: NOT DETECTED
SODIUM BLD-SCNC: 143 MMOL/L (ref 132–146)
TOTAL PROTEIN: 6.8 G/DL (ref 6.4–8.3)
TROPONIN, HIGH SENSITIVITY: 30 NG/L (ref 0–9)
TROPONIN, HIGH SENSITIVITY: 30 NG/L (ref 0–9)
WBC # BLD: 8.1 E9/L (ref 4.5–11.5)

## 2022-07-05 PROCEDURE — 83880 ASSAY OF NATRIURETIC PEPTIDE: CPT

## 2022-07-05 PROCEDURE — 6370000000 HC RX 637 (ALT 250 FOR IP): Performed by: EMERGENCY MEDICINE

## 2022-07-05 PROCEDURE — 71045 X-RAY EXAM CHEST 1 VIEW: CPT

## 2022-07-05 PROCEDURE — 71275 CT ANGIOGRAPHY CHEST: CPT

## 2022-07-05 PROCEDURE — 85025 COMPLETE CBC W/AUTO DIFF WBC: CPT

## 2022-07-05 PROCEDURE — 84484 ASSAY OF TROPONIN QUANT: CPT

## 2022-07-05 PROCEDURE — 93005 ELECTROCARDIOGRAM TRACING: CPT | Performed by: EMERGENCY MEDICINE

## 2022-07-05 PROCEDURE — 83690 ASSAY OF LIPASE: CPT

## 2022-07-05 PROCEDURE — 74177 CT ABD & PELVIS W/CONTRAST: CPT

## 2022-07-05 PROCEDURE — 6360000004 HC RX CONTRAST MEDICATION: Performed by: RADIOLOGY

## 2022-07-05 PROCEDURE — 87635 SARS-COV-2 COVID-19 AMP PRB: CPT

## 2022-07-05 PROCEDURE — 83735 ASSAY OF MAGNESIUM: CPT

## 2022-07-05 PROCEDURE — 80053 COMPREHEN METABOLIC PANEL: CPT

## 2022-07-05 PROCEDURE — 99285 EMERGENCY DEPT VISIT HI MDM: CPT

## 2022-07-05 RX ORDER — CHOLECALCIFEROL (VITAMIN D3) 125 MCG
5 CAPSULE ORAL NIGHTLY
Status: ON HOLD | COMMUNITY
End: 2022-10-28 | Stop reason: HOSPADM

## 2022-07-05 RX ORDER — AZITHROMYCIN 250 MG/1
250 TABLET, FILM COATED ORAL DAILY
COMMUNITY
Start: 2022-07-02 | End: 2022-07-06

## 2022-07-05 RX ORDER — LANOLIN ALCOHOL/MO/W.PET/CERES
1000 CREAM (GRAM) TOPICAL DAILY
COMMUNITY

## 2022-07-05 RX ORDER — METOPROLOL SUCCINATE 50 MG/1
50 TABLET, EXTENDED RELEASE ORAL NIGHTLY
Status: ON HOLD | COMMUNITY
End: 2022-10-28 | Stop reason: HOSPADM

## 2022-07-05 RX ORDER — ALBUTEROL SULFATE 90 UG/1
2 AEROSOL, METERED RESPIRATORY (INHALATION) EVERY 6 HOURS PRN
COMMUNITY

## 2022-07-05 RX ORDER — ASPIRIN 81 MG/1
81 TABLET ORAL DAILY
Status: ON HOLD | COMMUNITY
End: 2022-10-28 | Stop reason: HOSPADM

## 2022-07-05 RX ORDER — METHYLPREDNISOLONE 4 MG/1
2 TABLET ORAL 3 TIMES DAILY
Status: ON HOLD | COMMUNITY
End: 2022-10-28 | Stop reason: HOSPADM

## 2022-07-05 RX ADMIN — POTASSIUM BICARBONATE 20 MEQ: 782 TABLET, EFFERVESCENT ORAL at 16:43

## 2022-07-05 RX ADMIN — IOPAMIDOL 75 ML: 755 INJECTION, SOLUTION INTRAVENOUS at 17:01

## 2022-07-05 ASSESSMENT — ENCOUNTER SYMPTOMS
COUGH: 0
ABDOMINAL PAIN: 1
BACK PAIN: 0
NAUSEA: 0
SHORTNESS OF BREATH: 1
COLOR CHANGE: 0
VOMITING: 0
RHINORRHEA: 0
BLOOD IN STOOL: 0

## 2022-07-05 ASSESSMENT — PAIN - FUNCTIONAL ASSESSMENT: PAIN_FUNCTIONAL_ASSESSMENT: NONE - DENIES PAIN

## 2022-07-05 NOTE — ED NOTES
Pt's Granddaughter has been called and notified of pt leaving facility. Family member en route to pick pt up from hospital. Will follow up .      Cecily Hernandez RN  07/05/22 9413

## 2022-07-05 NOTE — ED NOTES
Name: Jolene Tavarez  : 1945  MRN: [id number]    Date: 2022    Benefits of immediately proceeding with Radiology exam outweigh the risks and therefore the following is being waived:      [] Pregnancy test    [] Protocol for Iodine allergy    [] MRI questionnaire    [x] BUN/Creatinine        MD Princess Abdi MD  22 [id number]

## 2022-07-05 NOTE — ED NOTES
This RN called lab regarding pending CMP results. Lab states that her pending labs are in process and should result shortly. Will follow up.       Jayden Guardado RN  07/05/22 9611

## 2022-07-05 NOTE — ED NOTES
Pt is refusing to wear telemetry monitor and pulse ox monitor. Pt educated on the need to continuously monitor pt. Will follow up.       Dania Hunt RN  07/05/22 8515

## 2022-07-05 NOTE — ED PROVIDER NOTES
ED PROVIDER NOTE    Chief Complaint   Patient presents with    Shortness of Breath     c/o sob worsening last few days, hx chf and smoker       HPI:  7/5/22,   Time: 1:39 PM EDT       Lb South is a 64 y.o. female presenting to the ED for shortness of breath. Gradual onset 2d ago, persistent since onset, moderate in severity. Worse w/ exertion and laying down, improved sitting upright. No associated leg swelling. Cough productive of clear sputum unchanged from baseline. Right sided abdominal pain intermittent chronically but worse over the past 2 days. Constant, throbbing, nonradiating. Hx of cholecystectomy and hernia repair with mesh. Last BM this morning. +flatus. No fever, chills, chest pain. Normal po intake and urine output. Chart review: hx of cholecystectomy, appendectomy, incisional hernia repair, HTN, lymphoma    Review of Systems:     Review of Systems   Constitutional: Negative for appetite change, chills and fever. HENT: Negative for congestion and rhinorrhea. Eyes: Negative for visual disturbance. Respiratory: Positive for shortness of breath. Negative for cough. Cardiovascular: Negative for chest pain. Gastrointestinal: Positive for abdominal pain. Negative for blood in stool, nausea and vomiting. Genitourinary: Negative for decreased urine volume and difficulty urinating. Musculoskeletal: Negative for back pain and neck pain. Skin: Negative for color change.    Neurological: Negative for dizziness, syncope, weakness, light-headedness, numbness and headaches.         --------------------------------------------- PAST HISTORY ---------------------------------------------  Past Medical History:   Past Medical History:   Diagnosis Date    Hypertension     Lymphoma ([address]     remission since 2015       Past Surgical History:   Past Surgical History:   Procedure Laterality Date    APPENDECTOMY      CHOLECYSTECTOMY, OPEN      HYSTERECTOMY (CERVIX STATUS UNKNOWN)      INCISIONAL HERNIA REPAIR         Social History:   Social History     Socioeconomic History    Marital status:      Spouse name: None    Number of children: None    Years of education: None    Highest education level: None   Occupational History    None   Tobacco Use    Smoking status: Current Every Day Smoker     Packs/day: 2.00    Smokeless tobacco: Never Used   Substance and Sexual Activity    Alcohol use: No    Drug use: Never    Sexual activity: None   Other Topics Concern    None   Social History Narrative    None     Social Determinants of Health     Financial Resource Strain:     Difficulty of Paying Living Expenses: Not on file   Food Insecurity:     Worried About Running Out of Food in the Last Year: Not on file    Stacey of Food in the Last Year: Not on file   Transportation Needs:     Lack of Transportation (Medical): Not on file    Lack of Transportation (Non-Medical): Not on file   Physical Activity:     Days of Exercise per Week: Not on file    Minutes of Exercise per Session: Not on file   Stress:     Feeling of Stress : Not on file   Social Connections:     Frequency of Communication with Friends and Family: Not on file    Frequency of Social Gatherings with Friends and Family: Not on file    Attends Episcopal Services: Not on file    Active Member of [address] [place] [address] or Organizations: Not on file    Attends Club or Organization Meetings: Not on file    Marital Status: Not on file   Intimate Partner Violence:     Fear of Current or Ex-Partner: Not on file    Emotionally Abused: Not on file    Physically Abused: Not on file    Sexually Abused: Not on file   Housing Stability:     Unable to Pay for Housing in the Last Year: Not on file    Number of [place] in the Last Year: Not on file    Unstable Housing in the Last Year: Not on file       Family History:   History reviewed. No pertinent family history.     The patients home medications have been reviewed. Allergies: Allergies   Allergen Reactions    Bactrim [Sulfamethoxazole-Trimethoprim] Shortness Of Breath    Ibuprofen Anaphylaxis    Codeine            ---------------------------------------------------PHYSICAL EXAM--------------------------------------    BP (!) 142/85   Pulse (!) 107   Temp 98.3 °F (36.8 °C) (Oral)   Resp 24   Ht 5' 6\" (1.676 m)   Wt 171 lb (77.6 kg)   SpO2 94%   BMI 27.60 kg/m²     Physical Exam  Vitals and nursing note reviewed. Constitutional:       General: She is not in acute distress. Appearance: She is not toxic-appearing. HENT:      Mouth/Throat:      Mouth: Mucous membranes are dry. Eyes:      General: No scleral icterus. Extraocular Movements: Extraocular movements intact. Pupils: Pupils are equal, round, and reactive to light. Cardiovascular:      Rate and Rhythm: Regular rhythm. Tachycardia present. Pulses: Normal pulses. Heart sounds: Normal heart sounds. No murmur heard. Pulmonary:      Effort: Pulmonary effort is normal. No respiratory distress. Breath sounds: Normal breath sounds. No wheezing or rales. Abdominal:      General: There is no distension. Palpations: Abdomen is soft. Tenderness: There is abdominal tenderness (diffuse right sided). There is no guarding or rebound. Musculoskeletal:         General: No swelling or tenderness. Normal range of motion. Cervical back: Normal range of motion and neck supple. Comments: Radial, DP, and PT pulses 2+ bilaterally. Skin:     General: Skin is warm and dry. Neurological:      Mental Status: She is alert and oriented to person, place, and time. Comments: Moves all extremities with appropriate strength. Sensation grossly intact in all extremities.              -------------------------------------------------- RESULTS -------------------------------------------------  I have personally reviewed all laboratory and imaging results for this the abdomen or the pelvis. 2. Severe distal colonic diverticulosis without diverticulitis. 3. Grade 1 anterolisthesis of L4 over L5 by approximately 7 mm. RECOMMENDATIONS:   Unavailable         XR CHEST PORTABLE   Final Result   1. Cardiomegaly with findings of mild CHF   2. The left lung base is poorly visualized. I cannot exclude a small left   pleural effusion. EKG:  This EKG is signed and interpreted by the EP. Afib, vent rate 94bpm, LAD, no acute injury pattern, poor R wave progression. Significantly changed compared w/ prior EKG on file which shows normal sinus rhythm      ------------------------- NURSING NOTES AND VITALS REVIEWED ---------------------------   The nursing notes within the ED encounter and vital signs as below have been reviewed by myself. BP (!) 142/85   Pulse (!) 107   Temp 98.3 °F (36.8 °C) (Oral)   Resp 24   Ht 5' 6\" (1.676 m)   Wt 171 lb (77.6 kg)   SpO2 94%   BMI 27.60 kg/m²   Oxygen Saturation Interpretation: Normal    The patients available past medical records and past encounters were reviewed. ------------------------------ ED COURSE/MEDICAL DECISION MAKING----------------------  Medications   potassium bicarb-citric acid (EFFER-K) effervescent tablet 20 mEq (20 mEq Oral Given 22 1643)   iopamidol (ISOVUE-370) 76 % injection 75 mL (75 mLs IntraVENous Given 22 1701)       Counseling: The emergency provider has spoken with the patient and discussed todays results, in addition to providing specific details for the plan of care and counseling regarding the diagnosis and prognosis. Questions are answered at this time and they are agreeable with the plan. ED Course/Medical Decision Makin y.o. female here with shortness of breath. Non-toxic appearing, afebrile, hemodynamically stable, and in no acute distress. Does have increased work of breathing with minimal exertion.   Workup notable for elevated high sens troponin and pro BNP, new onset afib on EKG. Lungs clear on exam and on CT. Recommended further evaluation and possible admission for new onset afib, however patient declined and before workup was complete requested to leave AMA. Alert, appropriately oriented, has capacity for medical decision making. Verbalized understanding of risks of leaving AMA including death and permanent disability. Will return for new/worsening symptoms or if she decides to resume evaluation and treatment.        --------------------------------- IMPRESSION AND DISPOSITION ---------------------------------    IMPRESSION  1. New onset atrial fibrillation ([address]    2. Shortness of breath    3. Right upper quadrant abdominal pain        DISPOSITION  Disposition: Other Disposition: Left AMA  Patient condition is stable    NOTE: This report was transcribed using voice recognition software.  Every effort was made to ensure accuracy; however, inadvertent computerized transcription errors may be present    Hattie Herring MD  Attending Emergency Physician         Hattie Herring MD  07/05/22 8827

## 2022-07-06 ENCOUNTER — APPOINTMENT (OUTPATIENT)
Dept: GENERAL RADIOLOGY | Age: 77
End: 2022-07-06
Payer: MEDICARE

## 2022-07-06 ENCOUNTER — HOSPITAL ENCOUNTER (EMERGENCY)
Age: 77
Discharge: LEFT BEFORE DISCHARGE | End: 2022-07-06
Attending: EMERGENCY MEDICINE
Payer: MEDICARE

## 2022-07-06 VITALS
SYSTOLIC BLOOD PRESSURE: 127 MMHG | HEART RATE: 114 BPM | WEIGHT: 170 LBS | RESPIRATION RATE: 24 BRPM | BODY MASS INDEX: 27.44 KG/M2 | TEMPERATURE: 97.6 F | OXYGEN SATURATION: 93 % | DIASTOLIC BLOOD PRESSURE: 77 MMHG

## 2022-07-06 DIAGNOSIS — R10.9 ABDOMINAL PAIN, UNSPECIFIED ABDOMINAL LOCATION: ICD-10-CM

## 2022-07-06 DIAGNOSIS — R06.02 SHORTNESS OF BREATH: Primary | ICD-10-CM

## 2022-07-06 DIAGNOSIS — I50.9 CONGESTIVE HEART FAILURE, UNSPECIFIED HF CHRONICITY, UNSPECIFIED HEART FAILURE TYPE (HCC): ICD-10-CM

## 2022-07-06 DIAGNOSIS — I48.91 ATRIAL FIBRILLATION, UNSPECIFIED TYPE (HCC): ICD-10-CM

## 2022-07-06 LAB
ANION GAP SERPL CALCULATED.3IONS-SCNC: 11 MMOL/L (ref 7–16)
BASOPHILS ABSOLUTE: 0.05 E9/L (ref 0–0.2)
BASOPHILS RELATIVE PERCENT: 0.5 % (ref 0–2)
BUN BLDV-MCNC: 19 MG/DL (ref 6–23)
CALCIUM SERPL-MCNC: 9.1 MG/DL (ref 8.6–10.2)
CHLORIDE BLD-SCNC: 97 MMOL/L (ref 98–107)
CO2: 32 MMOL/L (ref 22–29)
CREAT SERPL-MCNC: 1 MG/DL (ref 0.5–1)
EKG ATRIAL RATE: 117 BPM
EKG ATRIAL RATE: 33 BPM
EKG Q-T INTERVAL: 326 MS
EKG Q-T INTERVAL: 348 MS
EKG QRS DURATION: 72 MS
EKG QRS DURATION: 80 MS
EKG QTC CALCULATION (BAZETT): 435 MS
EKG QTC CALCULATION (BAZETT): 450 MS
EKG R AXIS: -84 DEGREES
EKG R AXIS: 174 DEGREES
EKG T AXIS: 102 DEGREES
EKG T AXIS: 22 DEGREES
EKG VENTRICULAR RATE: 115 BPM
EKG VENTRICULAR RATE: 94 BPM
EOSINOPHILS ABSOLUTE: 0.04 E9/L (ref 0.05–0.5)
EOSINOPHILS RELATIVE PERCENT: 0.4 % (ref 0–6)
GFR AFRICAN AMERICAN: >60
GFR NON-AFRICAN AMERICAN: 53 ML/MIN/1.73
GLUCOSE BLD-MCNC: 130 MG/DL (ref 74–99)
HCT VFR BLD CALC: 39.9 % (ref 34–48)
HEMOGLOBIN: 12.7 G/DL (ref 11.5–15.5)
IMMATURE GRANULOCYTES #: 0.04 E9/L
IMMATURE GRANULOCYTES %: 0.4 % (ref 0–5)
LYMPHOCYTES ABSOLUTE: 1.3 E9/L (ref 1.5–4)
LYMPHOCYTES RELATIVE PERCENT: 13.6 % (ref 20–42)
MAGNESIUM: 2 MG/DL (ref 1.6–2.6)
MCH RBC QN AUTO: 27.4 PG (ref 26–35)
MCHC RBC AUTO-ENTMCNC: 31.8 % (ref 32–34.5)
MCV RBC AUTO: 86 FL (ref 80–99.9)
MONOCYTES ABSOLUTE: 0.7 E9/L (ref 0.1–0.95)
MONOCYTES RELATIVE PERCENT: 7.3 % (ref 2–12)
NEUTROPHILS ABSOLUTE: 7.4 E9/L (ref 1.8–7.3)
NEUTROPHILS RELATIVE PERCENT: 77.8 % (ref 43–80)
PDW BLD-RTO: 16.1 FL (ref 11.5–15)
PLATELET # BLD: 382 E9/L (ref 130–450)
PMV BLD AUTO: 9.8 FL (ref 7–12)
POTASSIUM REFLEX MAGNESIUM: 3.3 MMOL/L (ref 3.5–5)
PRO-BNP: ABNORMAL PG/ML (ref 0–450)
RBC # BLD: 4.64 E12/L (ref 3.5–5.5)
SODIUM BLD-SCNC: 140 MMOL/L (ref 132–146)
TROPONIN, HIGH SENSITIVITY: 31 NG/L (ref 0–9)
TSH SERPL DL<=0.05 MIU/L-ACNC: 0.67 UIU/ML (ref 0.27–4.2)
WBC # BLD: 9.5 E9/L (ref 4.5–11.5)

## 2022-07-06 PROCEDURE — 83735 ASSAY OF MAGNESIUM: CPT

## 2022-07-06 PROCEDURE — 96360 HYDRATION IV INFUSION INIT: CPT

## 2022-07-06 PROCEDURE — 93005 ELECTROCARDIOGRAM TRACING: CPT | Performed by: STUDENT IN AN ORGANIZED HEALTH CARE EDUCATION/TRAINING PROGRAM

## 2022-07-06 PROCEDURE — 2580000003 HC RX 258: Performed by: STUDENT IN AN ORGANIZED HEALTH CARE EDUCATION/TRAINING PROGRAM

## 2022-07-06 PROCEDURE — 85025 COMPLETE CBC W/AUTO DIFF WBC: CPT

## 2022-07-06 PROCEDURE — 99285 EMERGENCY DEPT VISIT HI MDM: CPT

## 2022-07-06 PROCEDURE — 6370000000 HC RX 637 (ALT 250 FOR IP): Performed by: STUDENT IN AN ORGANIZED HEALTH CARE EDUCATION/TRAINING PROGRAM

## 2022-07-06 PROCEDURE — 84443 ASSAY THYROID STIM HORMONE: CPT

## 2022-07-06 PROCEDURE — 71045 X-RAY EXAM CHEST 1 VIEW: CPT

## 2022-07-06 PROCEDURE — 83880 ASSAY OF NATRIURETIC PEPTIDE: CPT

## 2022-07-06 PROCEDURE — 96361 HYDRATE IV INFUSION ADD-ON: CPT

## 2022-07-06 PROCEDURE — 80048 BASIC METABOLIC PNL TOTAL CA: CPT

## 2022-07-06 PROCEDURE — 84484 ASSAY OF TROPONIN QUANT: CPT

## 2022-07-06 RX ORDER — ACETAMINOPHEN 325 MG/1
650 TABLET ORAL ONCE
Status: COMPLETED | OUTPATIENT
Start: 2022-07-06 | End: 2022-07-06

## 2022-07-06 RX ORDER — NICOTINE 21 MG/24HR
1 PATCH, TRANSDERMAL 24 HOURS TRANSDERMAL DAILY
Status: DISCONTINUED | OUTPATIENT
Start: 2022-07-06 | End: 2022-07-06 | Stop reason: HOSPADM

## 2022-07-06 RX ORDER — 0.9 % SODIUM CHLORIDE 0.9 %
250 INTRAVENOUS SOLUTION INTRAVENOUS ONCE
Status: COMPLETED | OUTPATIENT
Start: 2022-07-06 | End: 2022-07-06

## 2022-07-06 RX ADMIN — ACETAMINOPHEN 650 MG: 325 TABLET ORAL at 09:20

## 2022-07-06 RX ADMIN — SODIUM CHLORIDE 250 ML: 9 INJECTION, SOLUTION INTRAVENOUS at 09:24

## 2022-07-06 ASSESSMENT — PAIN SCALES - GENERAL
PAINLEVEL_OUTOF10: 8

## 2022-07-06 ASSESSMENT — PAIN DESCRIPTION - LOCATION
LOCATION: ABDOMEN
LOCATION: ABDOMEN

## 2022-07-06 ASSESSMENT — PAIN - FUNCTIONAL ASSESSMENT
PAIN_FUNCTIONAL_ASSESSMENT: 0-10
PAIN_FUNCTIONAL_ASSESSMENT: 0-10

## 2022-07-06 NOTE — CARE COORDINATION
SS Note: Pt here for SOB and needs admission for CHF. Pt signed out AMA and is ready to leave. Pt in w/c in room #08, pulled out her IV & has left room. Merly-RN directed pt back to room. Pt's Dtr refuses to come get pt. Pt was seen here yesterday for same and left. SW met w/pt in ED 08 for transition of care planning. SW spoke from door wearing mask/PPE and explained role. Pt is residing w/her Dtr Jacki Jhaveri last year. Pt is  and reports she has $5000.00 on her card @ home that her Dtr oversees. Pt reports she pays no rent but all herm,oney id going to pay for a new garage for her Dtr. Pt states they do not get along and pt has no where else to go. Pt is s smoker and wants to smoke. She noted she use to smoke 2 packs a day but cut down to 1 pack every 2-3 days. She stated her Dtr called 911 and made her come to ED. SW talked to pt about her medical condition and need for in-pt however pt states she really does not want to be admitted. Pt reports she is 75 yo and is not concerned about her health- even if leaving can cause her death. Pt reports she has lived her life and SW completed ACP w/pt. She wants to be DNR/CC. Pt states she has 5 sons and one Dtr but only talks to one son and lives w/Tay but talked about how they do not get along and she she does not see any of her other children. Pt is aware her Dtr will not come to get her and she wants to wheel herself outside. SW notes need to call her Dtr to assess the situation. She noted SW can call Simon Durant. Pt wheeled her self to ED lobby. [facility] called [place] Cornea- pt's Dtr and she states she will be in ED in 2 minutes. SW notified pt. She states her Dtr does not drive. Simon Durant then showed up and was very abrasive w/pt- telling her \"get back to your room\" and that she needs to stay. Pt did not say much and asked Simon Durant how she arrived. Simon Durant notes her Dtr David Velez [phone] drove her here and is in the parking lot.  Tay grab wheel chair and starts wheeling pt to room #08, telling her she needs to be here. Pt did get upset and told Pawan Ruano she is treating her unkind and quit acting \"like a bitch\". Pawan Ruano noted pt is worse & behaving in an embarrassing manner. Conversation ensued in the room and Pawan Ruano tells pt to get up in the bed. Pt refuses and SW notes that pt already informed she should stay but is declining. Pawan Ruano notes she is fearful her mom will dies & SW notes she may die if her CHF is untreated. However, pt still not wanting to stay. Pt ended up wheeling herself outside ED entrance by bench. Her and Pawan Ruano continue to banter back and forth and SW notes all need to calm down and listen to one another. Pt is focused on getting a cigarette and Pawan Ruano asks her if she will stay if she gets her one. Pt is noncommittal. SW had to return inside. [facility] back outside & pt sitting on bench smoking & talking to someone. Her Dtr Pawan Ruano left and noted pt had to go live somewhere else to live. SW notes can call her. Pt remains adamant that she is not going to be admitted. SW called Clemenciaemily Alden. She states pt agreed to stay if she gave her a cigarette. SW notes pt not staying. Pawan Ruano notes she is w/her Dtr & they are not coming back to get pt. SW notes pt will get taxi to the house and Pawan Ruano stated they will be there after they stop @ store. [address] and set up transport. Notified pt.     [id number] and voucher signed.   Electronically signed by DANIELA Jones on 7/6/2022 at 1:34 PM

## 2022-07-06 NOTE — ACP (ADVANCE CARE PLANNING)
Advance Care Planning     Advance Care Planning Activator (Inpatient)  Conversation Note      Date of ACP Conversation: 7/6/2022     Conversation Conducted with: Patient with Decision Making Capacity    ACP Activator: [facility]rows [facility] Decision [facility]:     Current Designated Health Care Decision Maker:     Primary Decision Maker: Danni Caro - Child - [phone]    Secondary Decision Maker: Dk Serna - Child - [phone]  Click here to complete Healthcare Decision Makers including section of the Healthcare Decision Maker Relationship (ie \"Primary\")  Today we documented Decision Maker(s) consistent with Legal Next of Kin hierarchy. Care Preferences    Ventilation: \"If you were in your present state of health and suddenly became very ill and were unable to breathe on your own, what would your preference be about the use of a ventilator (breathing machine) if it were available to you? \"      Would the patient desire the use of ventilator (breathing machine)?: no    \"If your health worsens and it becomes clear that your chance of recovery is unlikely, what would your preference be about the use of a ventilator (breathing machine) if it were available to you? \"     Would the patient desire the use of ventilator (breathing machine)?: No      Resuscitation  \"CPR works best to restart the heart when there is a sudden event, like a heart attack, in someone who is otherwise healthy. Unfortunately, CPR does not typically restart the heart for people who have serious health conditions or who are very sick. \"    \"In the event your heart stopped as a result of an underlying serious health condition, would you want attempts to be made to restart your heart (answer \"yes\" for attempt to resuscitate) or would you prefer a natural death (answer \"no\" for do not attempt to resuscitate)? \" no       [] Yes   [x] No   Educated Patient / Elfredia Latin regarding differences between Advance Directives and portable DNR orders.     Length of ACP Conversation in minutes: 13     Conversation Outcomes:  [x] ACP discussion completed  [] Existing advance directive reviewed with patient; no changes to patient's previously recorded wishes  [] New Advance Directive completed  [] Portable Do Not Rescitate prepared for Provider review and signature  [] POLST/POST/MOLST/MOST prepared for Provider review and signature      Follow-up plan:    [] Schedule follow-up conversation to continue planning  [x] Referred individual to Provider for additional questions/concerns   [] Advised patient/agent/surrogate to review completed ACP document and update if needed with changes in condition, patient preferences or care setting    [x] This note routed to one or more involved healthcare providers

## 2022-07-06 NOTE — ED PROVIDER NOTES
should be admitted and the risk of leaving AMA, up to and including risk of permanent disability and death. She expressed understanding. She is AAOx4 and has capacity at this time to make such decisions. Patient signed out AMA with the understanding that she can return to the ED at any time. --------------------------------------------- PAST HISTORY ---------------------------------------------  Past Medical History:  has a past medical history of Hypertension and Lymphoma ([facility] [address] Past Surgical History:  has a past surgical history that includes Cholecystectomy, open; Appendectomy; Incisional hernia repair; and Hysterectomy. Social History:  reports that she has been smoking. She has been smoking about 2.00 packs per day. She has never used smokeless tobacco. She reports that she does not drink alcohol and does not use drugs. Family History: family history is not on file. The patients home medications have been reviewed.     Allergies: Bactrim [sulfamethoxazole-trimethoprim], Ibuprofen, and Codeine    -------------------------------------------------- RESULTS -------------------------------------------------  Labs:  Results for orders placed or performed during the hospital encounter of 07/06/22   CBC with Auto Differential   Result Value Ref Range    WBC 9.5 4.5 - 11.5 E9/L    RBC 4.64 3.50 - 5.50 E12/L    Hemoglobin 12.7 11.5 - 15.5 g/dL    Hematocrit 39.9 34.0 - 48.0 %    MCV 86.0 80.0 - 99.9 fL    MCH 27.4 26.0 - 35.0 pg    MCHC 31.8 (L) 32.0 - 34.5 %    RDW 16.1 (H) 11.5 - 15.0 fL    Platelets 307 850 - 234 E9/L    MPV 9.8 7.0 - 12.0 fL    Neutrophils % 77.8 43.0 - 80.0 %    Immature Granulocytes % 0.4 0.0 - 5.0 %    Lymphocytes % 13.6 (L) 20.0 - 42.0 %    Monocytes % 7.3 2.0 - 12.0 %    Eosinophils % 0.4 0.0 - 6.0 %    Basophils % 0.5 0.0 - 2.0 %    Neutrophils Absolute 7.40 (H) 1.80 - 7.30 E9/L    Immature Granulocytes # 0.04 E9/L    Lymphocytes Absolute 1.30 (L) 1.50 - 4.00 E9/L    Monocytes Absolute 0.70 0.10 - 0.95 E9/L    Eosinophils Absolute 0.04 (L) 0.05 - 0.50 E9/L    Basophils Absolute 0.05 0.00 - 0.20 F0/Y   Basic Metabolic Panel w/ Reflex to MG   Result Value Ref Range    Sodium 140 132 - 146 mmol/L    Potassium reflex Magnesium 3.3 (L) 3.5 - 5.0 mmol/L    Chloride 97 (L) 98 - 107 mmol/L    CO2 32 (H) 22 - 29 mmol/L    Anion Gap 11 7 - 16 mmol/L    Glucose 130 (H) 74 - 99 mg/dL    BUN 19 6 - 23 mg/dL    CREATININE 1.0 0.5 - 1.0 mg/dL    GFR Non-African American 53 >=60 mL/min/1.73    GFR African American >60     Calcium 9.1 8.6 - 10.2 mg/dL   Brain Natriuretic Peptide   Result Value Ref Range    Pro-BNP 30,472 (H) 0 - 450 pg/mL   Troponin   Result Value Ref Range    Troponin, High Sensitivity 31 (H) 0 - 9 ng/L   Magnesium   Result Value Ref Range    Magnesium 2.0 1.6 - 2.6 mg/dL   TSH   Result Value Ref Range    TSH 0.673 0.270 - 4.200 uIU/mL   EKG 12 Lead   Result Value Ref Range    Ventricular Rate 115 BPM    Atrial Rate 117 BPM    QRS Duration 80 ms    Q-T Interval 326 ms    QTc Calculation (Bazett) 450 ms    R Axis 174 degrees    T Axis 22 degrees       Radiology:  XR CHEST PORTABLE   Final Result   1. Cardiomegaly with findings of pulmonary vascular congestion. EKG:  This EKG is signed and interpreted by me. Rate: 115  Rhythm: Atrial fibrillation  Interpretation: Afib RVR; no acute ST elevations or depressions, no acute T wave changes  Comparison: changes compared to previous 7/5/22    ------------------------- NURSING NOTES AND VITALS REVIEWED ---------------------------  Date / Time Roomed:  7/6/2022  7:25 AM  ED Bed Assignment:  NIDA/NIDA    The nursing notes within the ED encounter and vital signs as below have been reviewed.    /77   Pulse (!) 114   Temp 97.6 °F (36.4 °C)   Resp 24   Wt 170 lb (77.1 kg)   SpO2 93%   BMI 27.44 kg/m²   Oxygen Saturation Interpretation: Normal           Discharge Medication List as of 7/6/2022 12:19 PM Diagnosis:  1. Shortness of breath    2. Abdominal pain, unspecified abdominal location    3. [facility], unspecified [facility] ([facility]    4.  Congestive heart failure, unspecified HF chronicity, unspecified heart failure type ([facility]        Disposition:  Patient's disposition: Thais Stern MD  Resident  07/07/22 9855

## 2022-07-06 NOTE — ED NOTES
DR CALLED TO ROOM TO TALK WITH PT AS SHE HAD PULLED OUT HER IV AND REMOVED HERSELF FROM THE MONITOR. STATES THAT SHE IS WANTING TO LEAVE. STATES THAT SHE'S IS ANGRY THAT IT IS HER BIRTHDAY AND SHE SHOULD BE GOING UP STAIRS. STATES THAT SHE CANNOT BE STUCK HERE. PT INFORMED NEED FOR A RIDE AND NO ONE AT HER BEDSIDE. PT STATES NEED USE OF PHONE. DR ENTERED ROOM ONCE MORE AND PT STATES SHE WILL THINK ABOUT STAYING.       Cameron Vargas RN  07/06/22 6935

## 2022-10-21 PROBLEM — K51.318: Status: ACTIVE | Noted: 2022-01-01

## 2022-10-21 NOTE — PROGRESS NOTES
Occupational Therapy  OCCUPATIONAL THERAPY INITIAL EVALUATION     [facility]  [facility]         Date:10/21/2022                                                   Patient Name: Leon Pichardo     MRN: [id number]     : 1945     Room: [address] [place] [address]       Evaluating OT: Eyvonne Libman, OTR/L; [id number]       Referring Provider and Orders/Date:     OT eval and treat  Start:  10/21/22 1100,   End:  10/21/22 1100,   ONE TIME,   Standing Count:  1 Occurrences,   R         Arielle Dee, DO        Diagnosis:   1. Proctocolitis    2. Diverticulosis    3.  Acute kidney injury [facility])         Surgery: none      Pertinent Medical History:        Past Medical History:   Diagnosis Date    Hypertension     Lymphoma ([facility] [address]     remission since           Past Surgical History:   Procedure Laterality Date    APPENDECTOMY      CHOLECYSTECTOMY, OPEN      HYSTERECTOMY (CERVIX STATUS UNKNOWN)      INCISIONAL HERNIA REPAIR         Precautions:  Fall Risk, impulsive    Recommended placement: subacute    Assessment of current deficits     [x] Functional mobility  [x]ADLs  [x] Strength               [x]Cognition     [x] Functional transfers   [x] IADLs         [x] Safety Awareness   [x]Endurance     [] Fine Coordination              [x] Balance      [] Vision/perception   []Sensation      [x]Gross Motor Coordination  [] ROM  [] Delirium                   [] Motor Control     OT PLAN OF CARE   OT POC based on physician orders, patient diagnosis and results of clinical assessment    Frequency/Duration 1-3 days/wk for 2 weeks PRN   Specific OT Treatment Interventions to include:   * Instruction/training on adapted ADL techniques and AE recommendations to increase functional independence within precautions       * Training on energy conservation strategies, correct breathing pattern and techniques to improve independence/tolerance for self-care routine  * Functional transfer/mobility training/DME recommendations for increased independence, safety, and fall prevention  * Patient/Family education to increase follow through with safety techniques and functional independence  * Recommendation of environmental modifications for increased safety with functional transfers/mobility and ADLs  * Cognitive retraining/development of therapeutic activities to improve problem solving, judgement, memory, and attention for increased safety/participation in ADL/IADL tasks  * Therapeutic exercise to improve motor endurance, ROM, and functional strength for ADLs/functional transfers  * Therapeutic activities to facilitate/challenge dynamic balance, stand tolerance for increased safety and independence with ADLs  * Therapeutic activities to facilitate gross/fine motor skills for increased independence with ADLs  * Neuro-muscular re-education: facilitation of righting/equilibrium reactions, midline orientation, scapular stability/mobility, normalization of muscle tone, and facilitation of volitional active controled movement  * Positioning to improve skin integrity, interaction with environment and functional independence     Recommended Adaptive Equipment/DME:  TBD      Home Living: Pt lives at home with daughter who has a brain trama from a few years. 2 story home 3 steps to enter with rails. First floor set up.      Bathroom setup: tub shower with shower chair if needed and standard toilet    DME owned: cane, ww, wc     Prior Level of Function: indep with ADLs but not thorough or consistent, assist with IADLs; ambulated with cane   Driving: no   Occupation: none   Enjoys: watching TV, limited since CA diagnosis     Pain Level: abdomin due to constipation; nursing provided with medication prior to arrival  Cognition: A&O: 3/4; Follows 2 step directions   Memory:  fair-   Sequencing:  fair-   Problem solving:  poor+   Judgement/safety:  poor+    AM-PAC Daily Activity Inpatient   How much help for putting on and taking off regular lower body clothing?: A Lot  How much help for Bathing?: A Lot  How much help for Toileting?: A Little  How much help for putting on and taking off regular upper body clothing?: A Little  How much help for taking care of personal grooming?: A Little  How much help for eating meals?: A Little  AM-PAC Inpatient Daily Activity Raw Score: 16  AM-PAC Inpatient ADL T-Scale Score : 35.96  ADL Inpatient CMS 0-100% Score: 53.32  ADL Inpatient CMS G-Code Modifier : CK     Functional Assessment:     Initial Eval Status  Date: 10/21/2022   Treatment Status  Date: STGs = LTGs  Time frame: 10-14 days   Feeding Supervision with drinking and impulsivity   Indep   Grooming Stand by Assist at sink for hand wash and hair comb  Independent    UB Dressing Minimal Assist with gown management  Independent    LB Dressing Moderate Assist with socks and pants to thread over R foot and standing balance  Stand by Assist    Bathing Moderate Assist with sponge bathing from sitting/standing due to impulsivity   Stand by Assist    Toileting Minimal Assist for safety and balance with cane  Stand by Assist    Bed Mobility  Pt up in arm chair on arrival.  Supine to sit: Independent   Sit to supine: Independent    Functional Transfers Minimal Assist from arm chair and toilet with cane  Modified [place]    Functional Mobility Minimal Assist with cane for short distance functional mobility throughout the room to simulate house hold distances.     Modified Standish    Balance Sitting:     Static:  fair+    Dynamic:fair  Standing: fair-  Sitting:     Static:  good    Dynamic:fair+  Standing: fair   Activity Tolerance Vitals with activity:room air with fair- tolerance and signs of SOB per \"smoking\"; 95% HR74; standing tolerance 1min average    Increase standing tolerance for >3min with stable vital signs for carry over into toileting, functional tranfers and indep in ADLs   Visual/  Perceptual Glasses: Present; WFL    Reports change in vision since admission: No     NA     Hand Dominance  [x] Right  [] Left    AROM (PROM) Strength Additional Info:  Goal:   RUE  WFL 4/5 good  and  FMC/dexterity noted during ADL tasks  Opposition [x] Intact [] Impaired  Finger to nose [x] Intact [] Impaired 5/5MMT generally for carry over into self care, functional transfers and functional mobility with AD. LUE WFL 4/5 good  and  FMC/dexterity noted during ADL tasks  Opposition [x] Intact [] Impaired  Finger to nose [x] Intact [] Impaired 5/5MMT generally for carry over into self care, functional transfers and functional mobility with AD. Hearing: WFL   Sensation:  No c/o numbness or tingling   Tone: WFL   Edema: none    Comments: Upon arrival patient sitting up in arm chair. Pt required max A for most UB ADLs and max A LB ADLs tasks. Limited with min A for standing during LB ADLs and functional transfers. The biggest barriers reflect that of functional transfers, functional mobility, UB/LB ADLs, cognition, activity tolerance, balance, safety and strengthening. At end of session, patient sitting up in arm chair with call light and phone within reach, chair alarm, all lines and tubes intact. Overall patient demonstrated decreased independence and safety during completion of ADL/functional transfer/mobility tasks compared to PLOF. Nursing updated on pt position and status following OT eval. Pt would benefit from continued skilled OT to increase safety and independence with completion of ADL/IADL tasks for functional independence and quality of life. Treatment: OT treatment provided this date includes:  Instruction, education and training on safe facilitation and adapted techniques for completion of ADLs.  These include neuromuscular reeducation to facilitate balance/righting reactions,safe functional transfer techniques, proper positioning/alignment to improve interaction with environment and overall function and on adapted techniques/work simplification for completion of ADLs. Education provided on hand/feet placement with arm chair, cane and toilet and body mechanics for fall prevention. Cues for energy conservation and safety for in the home at DC, including modifications and DME. Extended time to complete all tasks, including skilled monitoring of patient's response during treatment session and vital signs. Prior to and at the end of session, environmental modifications / line management completed for patients safety and efficiency of treatment session. See above for further details. Rehab Potential: Good for established goals     Patient / Family Goal: Pain management      Patient and/or family were instructed on functional diagnosis, prognosis/goals and OT plan of care. Demonstrated fair understanding. Eval Complexity: Low  History: Brief review of medical records and additional review of physical, cognitive, or psychosocial history related to current functional performance  Exam: 3+ performance deficits  Assistance/Modification: Mod assistance or modifications required to perform tasks. May have comorbidities that affect occupational performance. Time In: 1418  Time Out: 1440  Total Treatment Time: 0    Min Units   OT Eval Low 97165  x  1   OT Eval Medium 51366      OT Eval High 94419      OT Re-Eval [id number]       Therapeutic Ex 42029       Therapeutic Activities 93364       ADL/Self Care 16422       Orthotic Management 06935       Manual 51516     Neuro Re-Ed 22812       Non-Billable Time          Evaluation Time additionally includes thorough review of current medical information, gathering information on past medical history/social history and prior level of function, interpretation of standardized testing/informal observation of tasks, assessment of data and development of plan of care and goals.             Tori Proctor OTR/L; [id number]

## 2022-10-21 NOTE — PROGRESS NOTES
1515 Patient unable to answer questions on data base due to confusion. RN Manager Jersey Cardoza left message on daughter\"s phone  number to call us to answer questions. Patient lives with daughter and takes care of her meds. Son called back and spoke to [employer]. Danelle Press He answered questions as much as he knew. Data base remains not complete.

## 2022-10-21 NOTE — CARE COORDINATION
Ss note:10/21/72243:58 PM Met with pt, no family present. Pt relays she lives with her dtr Viniciusmychal Leigh, per charting the number we have for dtr is no longer in service. Pt has a son Nathan Parada whom pt relays is a teacher, relays has a granddtr Garry Wakefield, no contact numbers in chart. PTA pt uses a cane daily relays her dtr assists with homemaking chores, pt relays they have meals delivered. PCP is Dr. Liliana Smith. Pt relays family does not have a car and that her granddtr transports when she can get ahold of her. Pt reports a hx of [employer]. Pt expressed several times that she wants to go home. Uses [facility] on [address]. SW will follow.  DANIELA Adam

## 2022-10-21 NOTE — PROGRESS NOTES
Pt removed her iv and attempted to leave the floor. Pt stated that she wants to leave so she can smoke. Pt has no plan on how she can return home tonight and plans to walk. Pt refuses to have a new iv placed at this time. I placed a telesitter on her and notified Dr. Willy Vivas. that the pt is without an iv. I will attempt to place one again later.

## 2022-10-21 NOTE — H&P
Department of Internal Medicine  History and Physical    PCP: Dr. Epifanio Morales   Admitting Physician: Dr. Loli Rasheed:  Abdominal pain and constipation    HISTORY OF PRESENT ILLNESS:    Kaushal Martinez is a 77-year-old female who presented to [address] [place] [address] emergency department for the evaluation of intractable lower quadrant abdominal pain with constipation over 5 days. While in the emergency department she did have a small bowel movement but continued to describe abdominal discomfort. Work-up revealed mild proctocolitis and it was determined she would be admitted to the hospital for more aggressive bowel regimen and pain control. She is feeling somewhat better on examination today. We discussed her past medical history that includes non-Hodgkin's lymphoma that has been in remission since 2015. PAST MEDICAL Hx:  Past Medical History:   Diagnosis Date    Hypertension     Lymphoma ([address]     remission since 2015       PAST SURGICAL Hx:   Past Surgical History:   Procedure Laterality Date    APPENDECTOMY      CHOLECYSTECTOMY, OPEN      HYSTERECTOMY (CERVIX STATUS UNKNOWN)      INCISIONAL HERNIA REPAIR         FAMILY Hx:  History reviewed. No pertinent family history. HOME MEDICATIONS:  Prior to Admission medications    Medication Sig Start Date End Date Taking?  Authorizing Provider   albuterol sulfate HFA (VENTOLIN HFA) 108 (90 Base) MCG/ACT inhaler Inhale 2 puffs into the lungs every 6 hours as needed for Wheezing    Historical Provider, MD   vitamin B-12 (CYANOCOBALAMIN) 1000 MCG tablet Take 1,000 mcg by mouth daily    Historical Provider, MD   metoprolol succinate (TOPROL XL) 50 MG extended release tablet Take 50 mg by mouth nightly    Historical Provider, MD   aspirin 81 MG EC tablet Take 81 mg by mouth daily    Historical Provider, MD   apixaban (ELIQUIS) 5 MG TABS tablet Take 5 mg by mouth daily    Historical Provider, MD   melatonin 5 MG TABS tablet Take 5 mg by mouth nightly    Historical Provider, MD   methylPREDNISolone (MEDROL) 4 MG tablet Take 2 mg by mouth 3 times daily    Historical Provider, MD       ALLERGIES:  Bactrim [sulfamethoxazole-trimethoprim], Ibuprofen, and Codeine    SOCIAL Hx:  Social History     Socioeconomic History    Marital status:      Spouse name: Not on file    Number of children: Not on file    Years of education: Not on file    Highest education level: Not on file   Occupational History    Not on file   Tobacco Use    Smoking status: Every Day     Packs/day: 2.00     Types: Cigarettes    Smokeless tobacco: Never   Substance and Sexual Activity    Alcohol use: No    Drug use: Never    Sexual activity: Not on file   Other Topics Concern    Not on file   Social History Narrative    Not on file     Social Determinants of Health     Financial Resource Strain: Not on file   Food Insecurity: Not on file   Transportation Needs: Not on file   Physical Activity: Not on file   Stress: Not on file   Social Connections: Not on file   Intimate Partner Violence: Not on file   Housing Stability: Not on file       ROS:  General:   Denies chills, fatigue, fever, malaise, night sweats or weight loss    Psychological:   Denies anxiety, disorientation or hallucinations    ENT:    Denies epistaxis, headaches, vertigo or visual changes    Cardiovascular:   Denies any chest pain, irregular heartbeats, or palpitations. No paroxysmal nocturnal dyspnea. Respiratory:   Denies shortness of breath, coughing, sputum production, hemoptysis, or wheezing. No orthopnea. Gastrointestinal:   Ongoing, bilateral lower quadrant abdominal pain with some relief when compared to initial presentation. She did have a small bowel movement while in the emergency department. Genito-Urinary:    Denies any urgency, frequency, hematuria. Voiding without difficulty.     Musculoskeletal:   Denies joint pain, joint stiffness, joint swelling or muscle pain    Neurology:    Denies any headache or focal neurological deficits. No weakness or paresthesia. Derm:    Denies any rashes, ulcers, or excoriations. Denies bruising. Extremities:   Denies any lower extremity swelling or edema. PHYSICAL EXAM:  VITALS:  Vitals:    10/21/22 0956   BP: 111/73   Pulse: (!) 110   Resp: 19   Temp: 97.1 °F (36.2 °C)   SpO2: 95%         CONSTITUTIONAL:    Awake, alert, cooperative, no apparent distress, and appears stated age    EYES:    PERRL, EOMI, sclera clear, conjunctiva normal    ENT:    Normocephalic, atraumatic, sinuses nontender on palpation. External ears without lesions. Oral pharynx with moist mucus membranes. Tonsils without erythema or exudates. NECK:    Supple, symmetrical, trachea midline, no adenopathy, thyroid symmetric, not enlarged and no tenderness, skin normal, no bruits, no JVD    HEMATOLOGIC/LYMPHATICS:    No cervical lymphadenopathy and no supraclavicular lymphadenopathy    LUNGS:    Symmetric. No increased work of breathing, good air exchange, clear to auscultation bilaterally, no wheezes, rhonchi, or rales,     CARDIOVASCULAR:    Normal apical impulse, regular rate and rhythm, normal S1 and S2, no S3 or S4, and no murmur noted    ABDOMEN:    Soft. Mildly tender to palpation particularly localizing to the left and right lower quadrants. Voluntary guarding is elicited. No rebound tenderness. No peritoneal signs. MUSCULOSKELETAL:    There is no redness, warmth, or swelling of the joints. Full range of motion noted. Motor strength is 5 out of 5 all extremities bilaterally. Tone is normal.    NEUROLOGIC:    Awake, alert, oriented to name, place and time. Cranial nerves II-XII are grossly intact. Motor is 5 out of 5 bilaterally. SKIN:    No bruising or bleeding. No redness, warmth, or swelling    EXTREMITIES:    Peripheral pulses present. No edema, cyanosis, or swelling. OSTEOPATHIC:    Examined in seated and supine positions. Normal thoracic kyphosis and lumbar lordosis. No acute somatic dysfunction. LABORATORY DATA:  CBC with Differential:    Lab Results   Component Value Date/Time    WBC 10.2 10/21/2022 06:31 AM    RBC 4.74 10/21/2022 06:31 AM    HGB 12.7 10/21/2022 06:31 AM    HCT 40.3 10/21/2022 06:31 AM     10/21/2022 06:31 AM    MCV 85.0 10/21/2022 06:31 AM    MCH 26.8 10/21/2022 06:31 AM    MCHC 31.5 10/21/2022 06:31 AM    RDW 16.7 10/21/2022 06:31 AM    LYMPHOPCT 17.7 10/21/2022 06:31 AM    MONOPCT 9.1 10/21/2022 06:31 AM    BASOPCT 0.2 10/21/2022 06:31 AM    MONOSABS 0.92 10/21/2022 06:31 AM    LYMPHSABS 1.80 10/21/2022 06:31 AM    EOSABS 0.02 10/21/2022 06:31 AM    BASOSABS 0.02 10/21/2022 06:31 AM     BMP:    Lab Results   Component Value Date/Time     10/21/2022 06:31 AM    K 3.6 10/21/2022 06:31 AM    K 3.3 07/06/2022 08:30 AM    CL 96 10/21/2022 06:31 AM    CO2 28 10/21/2022 06:31 AM    BUN 60 10/21/2022 06:31 AM    LABALBU 3.8 10/21/2022 06:31 AM    CREATININE 1.5 10/21/2022 06:31 AM    CALCIUM 9.0 10/21/2022 06:31 AM    GFRAA >60 07/06/2022 08:30 AM    LABGLOM 35 10/21/2022 06:31 AM    GLUCOSE 134 10/21/2022 06:31 AM       ASSESSMENT:  Acute proctocolitis in the setting of severe constipation  Acute renal insufficiency  History of non-Hodgkin's lymphoma currently in remission  COPD with ongoing tobacco abuse    PLAN:  A more aggressive bowel regimen will be implemented in the setting of significant constipation. We reinforced the need for early ambulation and activity. As she has begun having bowel movements, diet will be advanced. Antibiotic therapy will be employed. IV fluid resuscitation will be undertaken and we will monitor her chronic comorbidities that includes COPD. We discussed tobacco cessation. Crescencio Lange was counseled on smoking cessation. We have had extensive discussion regarding the need to quit smoking, the negative health implications of smoking overall, as well as the impact on Yuli's comorbid conditions.  Crescencio Lange does voice a willingness to quit smoking and we have set a quit date. We have discussed potential methods for smoking cessation including nicotine replacement via patch, gum or lozenge, behavioral and lifestyle modifications, and follow-up with primary care provider for consideration of medications for smoking cessation as well. We have also discussed additional resources such as CDC site for additional information, [employer], and [facility]. We have discussed the arrangement of follow-up with primary care provider to discuss progress as well as the potential institution of medications if required/desired. The amount of time spent counseling the patient directly regarding smoking cessation is greater than 10 minutes.        Emmanuel Quiñones DO, D.O., [facility]  10:41 AM  10/21/2022    Electronically signed by Emmanuel Quiñones DO on 10/21/22 at 10:41 AM EDT

## 2022-10-21 NOTE — PROGRESS NOTES
I attempted and was unable to reach daughter Gregg Herrera to attempt to complete admission database.

## 2022-10-21 NOTE — LETTER
[place] Department Medicaid  CERTIFICATION OF NECESSITY  FOR NON-EMERGENCY TRANSPORTATION   BY GROUND AMBULANCE      Individual Information   1. Name: Juan Grace 2. [place] Medicaid Billing Number:    3. Address: [address] [place] [address]      Transportation Provider Information   4. Provider Name: Rabia Stokesprosper   5. [place] Medicaid Provider Number:  National Provider Identifier (NPI):      Certification  7. Criteria:  During transport, this individual requires:  [x] Medical treatment or continuous     supervision by an EMT. [x] The administration or regulation of oxygen by another person. [] Supervised protective restraint. 8. Period Beginning Date: 10/28/2022   9. Length  [x] Not more than 1 day(s)  [] One Year     Additional Information Relevant to Certification   10. Comments or Explanations, If Necessary or Appropriate   ABDOMINAL PAIN AND CONSTIPATION, COPD, O2 AT 2 LITERS CONTINUOUS, EF 10%, EARLY DEMENTIA, FALLS RISK, POOR SAFETY AWARENESS - IMPULSIVE         Certifying Practitioner Information   11. Name of Practitioner: DR Carly Claire DO    12. [place] Medicaid Provider Number, If Applicable:  [address] Provider Identifier (NPI): [id number]     Signature Information   14. Date of Signature:10/28/2022 13. Name of Person Signing: DANIELA Moody   16. Signature and Professional Designation: Karen Hanley [address] [place] [address].10/28/2022.10:14 AM.      OD [id number]  Rev. 7/2015    MABEL Knowles Encounter Date/Time: 10/21/2022 [address] Account: [de-identified]    MRN: [id number]    Patient: Juan Grace    Contact Serial #: [id number]      ENCOUNTER          Patient Class: I Private Enc?   No Unit RM BD: [place] Service: MED   Encounter DX: Proctocolitis [K52.9]   ADM Provider: Alex Fallon DO   Procedure:     ATT Provider: Alex Fallon DO   REF Provider: Renetta Peat      Admission DX: Proctocolitis, Diverticulosis, Acute kidney injury ([facility] [address] Idiopathic proctocolitis, other complication ([facility] [address] and DX codes: K52.9, K57.90, N17.9, K51.318      PATIENT                 Name: Ade Castillo : 1945 (77 yrs)   Address: [place]  Sex: Female   City: [place] [address]         Marital Status:    Employer: [employer]         Yazidi: Dimple Tate   Primary Care Provider: Janett Vides MD         Primary Phone: [phone]   EMERGENCY CONTACT   Contact Name Legal Guardian? Relationship to Patient Home Phone Work Phone   1. Rolando Perez  2. [address] [facility] No  No Child  Child [phone]              GUARANTOR            Guarantor: Ade Castillo     : 1945   Address: [address] [place] [address]  Sex: Female     [place] [address]     Relation to Patient: Self       Home Phone: [phone]   Guarantor ID: [id number]       Work Phone:     Guarantor Employer: [employer]         Status: RETIRED      COVERAGE        PRIMARY INSURANCE   Payor: [facility] MEDICARE Plan: [employer] [place] MEDICARE ADVANT*   Payor Address: ,          Group Number: [id number] Insurance Type: INDEMNITY   Subscriber Name: Denver Golas : 1945   Subscriber ID: [id number] Pat. Rel. to Sub: Self   SECONDARY INSURANCE   Payor:   Plan:     Payor Address:  ,           Group Number:   Insurance Type:     Subscriber Name:   Subscriber :     Subscriber ID:   Pat.  Rel. to Sub:           CSN: [id number]

## 2022-10-21 NOTE — ED NOTES
Radiology Procedure Waiver   Name: Eliazar Celeste  : 1945  MRN: [id number]    Date:  10/21/22    Time: 4:29 AM EDT    Benefits of immediately proceeding with Radiology exam(s) without pre-testing outweigh the risks or are not indicated as specified below and therefore the following is/are being waived:    [] Pregnancy test   [] Patients LMP on-time and regular.   [] Patient had Tubal Ligation or has other Contraception Device. [] Patient  is Menopausal or Premenarcheal.    [] Patient had Full or Partial Hysterectomy. [] Protocol for Iodine allergy    [] MRI Questionnaire     [] BUN/Creatinine   [] Patient age w/no hx of renal dysfunction. [] Patient on Dialysis. [] Recent Normal Labs.   Electronically signed by Amy Guerrero DO on 10/21/22 at 4:29 AM EDT              Amy Guerrero DO  Resident  10/21/22 2858

## 2022-10-21 NOTE — ED PROVIDER NOTES
HPI     Patient is a 76 y.o. female presents with a chief complaint of abdominal pain. Patient was brought in by EMS due to chronic abdominal pain. Patient states that she has not had a bowel movement in 6 days. Patient was seen and scanned at [facility]. Patient has been to the hospital multiple times with this complaint. She states that she gave herself an enema last night and has yet to have a bowel movement. This has been occurring for 6 days. Patient states that it gets better with nothing. Patient states that it gets worse with nothing. Patient states that it is severe in severity. Patient states it was gradual in onset. Review of Systems   Constitutional:  Positive for appetite change, chills and fatigue. Negative for fever. HENT:  Negative for congestion, rhinorrhea, sinus pain and sore throat. Eyes:  Negative for photophobia and visual disturbance. Respiratory:  Negative for cough, chest tightness and shortness of breath. Cardiovascular:  Negative for chest pain and palpitations. Gastrointestinal:  Positive for abdominal pain, constipation, nausea and vomiting. Negative for blood in stool and diarrhea. Endocrine: Negative for polydipsia and polyuria. Genitourinary:  Negative for dysuria and urgency. Musculoskeletal:  Negative for back pain and neck pain. Skin:  Negative for rash. Neurological:  Negative for dizziness and headaches. Psychiatric/Behavioral:  Negative for confusion. All other systems reviewed and are negative. Physical Exam  Vitals and nursing note reviewed. Constitutional:       General: She is in acute distress. Appearance: She is not ill-appearing or toxic-appearing. HENT:      Head: Normocephalic. Mouth/Throat:      Mouth: Mucous membranes are moist.      Pharynx: No pharyngeal swelling or oropharyngeal exudate. Eyes:      General: No scleral icterus. Cardiovascular:      Rate and Rhythm: Tachycardia present. Heart sounds: No murmur heard. No friction rub. No gallop. Pulmonary:      Effort: Pulmonary effort is normal. No respiratory distress. Breath sounds: No wheezing or rales. Abdominal:      General: Abdomen is protuberant. A surgical scar is present. There is no distension. There are no signs of injury. Palpations: Abdomen is soft. Tenderness: There is abdominal tenderness (Generalized abdominal tenderness with left lower quadrant being significantly more tender) in the periumbilical area, suprapubic area and left lower quadrant. There is no right CVA tenderness, left CVA tenderness or guarding. Negative signs include Ching's sign. Skin:     General: Skin is warm. Capillary Refill: Capillary refill takes less than 2 seconds. Coloration: Skin is not cyanotic or mottled. Neurological:      General: No focal deficit present. Mental Status: She is alert. Psychiatric:         Mood and Affect: Mood normal.        Procedures     MDM     Amount and/or Complexity of Data Reviewed  Clinical lab tests: reviewed  Tests in the radiology section of CPT®: reviewed  Decide to obtain previous medical records or to obtain history from someone other than the patient: yes    Patient was given contrast CT of the abdomen pelvis to determine the cause of her abdominal pain. Abdominal CT showed acute proctocolitis as well as diverticulosis. Patient had a lactate of 4.2. Patient also had an elevated creatinine with an elevated BUN suggestive of an MAGGI. Patient will need to be admitted for monitoring on her MAGGI and fluid replacement. Patient needs to be admitted to treat her proctocolitis. Patient is also at risk for developing diverticulitis from her diverticulosis. Patient is a 76 y.o. female presenting with abdominal pain.             Patient was seen and evaluated by myself and my attending Dr. Bernis Sandifer, MD. Assessment and Plan discussed with attending provider, please see attestation for final plan of care. This note was done using dictation software and there may be some grammatical errors associated with this. Amy Guerrero DO         --------------------------------------------- PAST HISTORY ---------------------------------------------  Past Medical History:  has a past medical history of Hypertension and Lymphoma ([facility] [address] Past Surgical History:  has a past surgical history that includes Cholecystectomy, open; Appendectomy; Incisional hernia repair; and Hysterectomy. Social History:  reports that she has been smoking. She has been smoking an average of 2 packs per day. She has never used smokeless tobacco. She reports that she does not drink alcohol and does not use drugs. Family History: family history is not on file. The patients home medications have been reviewed.     Allergies: Bactrim [sulfamethoxazole-trimethoprim], Ibuprofen, and Codeine    -------------------------------------------------- RESULTS -------------------------------------------------    LABS:  Results for orders placed or performed during the hospital encounter of 10/21/22   Comprehensive Metabolic Panel   Result Value Ref Range    Sodium 137 132 - 146 mmol/L    Potassium 3.4 (L) 3.5 - 5.0 mmol/L    Chloride 94 (L) 98 - 107 mmol/L    CO2 30 (H) 22 - 29 mmol/L    Anion Gap 13 7 - 16 mmol/L    Glucose 141 (H) 74 - 99 mg/dL    BUN 58 (H) 6 - 23 mg/dL    Creatinine 1.6 (H) 0.5 - 1.0 mg/dL    Est, Glom Filt Rate 33 >=60 mL/min/1.73    Calcium 9.6 8.6 - 10.2 mg/dL    Total Protein 6.9 6.4 - 8.3 g/dL    Albumin 4.1 3.5 - 5.2 g/dL    Total Bilirubin 1.5 (H) 0.0 - 1.2 mg/dL    Alkaline Phosphatase 105 (H) 35 - 104 U/L    ALT 19 0 - 32 U/L    AST 26 0 - 31 U/L   Lipase   Result Value Ref Range    Lipase 19 13 - 60 U/L   Lactic Acid   Result Value Ref Range    Lactic Acid 4.2 (HH) 0.5 - 2.2 mmol/L   CBC with Auto Differential   Result Value Ref Range    WBC 12.3 (H) 4.5 - 11.5 E9/L    RBC 4.83 3.50 - 5.50 E12/L    Hemoglobin 13.2 11.5 - 15.5 g/dL    Hematocrit 41.3 34.0 - 48.0 %    MCV 85.5 80.0 - 99.9 fL    MCH 27.3 26.0 - 35.0 pg    MCHC 32.0 32.0 - 34.5 %    RDW 16.8 (H) 11.5 - 15.0 fL    Platelets 725 521 - 662 E9/L    MPV 11.2 7.0 - 12.0 fL    Neutrophils % 68.0 43.0 - 80.0 %    Immature Granulocytes % 0.7 0.0 - 5.0 %    Lymphocytes % 22.5 20.0 - 42.0 %    Monocytes % 8.3 2.0 - 12.0 %    Eosinophils % 0.3 0.0 - 6.0 %    Basophils % 0.2 0.0 - 2.0 %    Neutrophils Absolute 8.35 (H) 1.80 - 7.30 E9/L    Immature Granulocytes # 0.09 E9/L    Lymphocytes Absolute 2.77 1.50 - 4.00 E9/L    Monocytes Absolute 1.02 (H) 0.10 - 0.95 E9/L    Eosinophils Absolute 0.04 (L) 0.05 - 0.50 E9/L    Basophils Absolute 0.02 0.00 - 0.20 E9/L   CBC with Auto Differential   Result Value Ref Range    WBC 10.2 4.5 - 11.5 E9/L    RBC 4.74 3.50 - 5.50 E12/L    Hemoglobin 12.7 11.5 - 15.5 g/dL    Hematocrit 40.3 34.0 - 48.0 %    MCV 85.0 80.0 - 99.9 fL    MCH 26.8 26.0 - 35.0 pg    MCHC 31.5 (L) 32.0 - 34.5 %    RDW 16.7 (H) 11.5 - 15.0 fL    Platelets 814 489 - 389 E9/L    MPV 10.6 7.0 - 12.0 fL    Neutrophils % 72.1 43.0 - 80.0 %    Immature Granulocytes % 0.7 0.0 - 5.0 %    Lymphocytes % 17.7 (L) 20.0 - 42.0 %    Monocytes % 9.1 2.0 - 12.0 %    Eosinophils % 0.2 0.0 - 6.0 %    Basophils % 0.2 0.0 - 2.0 %    Neutrophils Absolute 7.32 (H) 1.80 - 7.30 E9/L    Immature Granulocytes # 0.07 E9/L    Lymphocytes Absolute 1.80 1.50 - 4.00 E9/L    Monocytes Absolute 0.92 0.10 - 0.95 E9/L    Eosinophils Absolute 0.02 (L) 0.05 - 0.50 E9/L    Basophils Absolute 0.02 0.00 - 0.20 E9/L       RADIOLOGY:  XR CHEST (2 VW)   Final Result   No acute disease. RECOMMENDATION:   Careful clinical correlation and follow up recommended. CT ABDOMEN PELVIS W IV CONTRAST Additional Contrast? None   Final Result   1. Mild proctocolitis. 2. Trace ascites. RECOMMENDATIONS:   Careful clinical correlation and follow up recommended. ------------------------- NURSING NOTES AND VITALS REVIEWED ---------------------------  Date / Time Roomed:  10/21/2022  4:18 AM  ED Bed Assignment:  06/06    The nursing notes within the ED encounter and vital signs as below have been reviewed. Patient Vitals for the past 24 hrs:   BP Temp Temp src Pulse Resp SpO2 Height Weight   10/21/22 0700 -- 97.3 °F (36.3 °C) -- -- -- -- -- --   10/21/22 0658 -- -- -- -- 16 96 % -- --   10/21/22 0647 -- -- -- (!) 105 -- -- -- --   10/21/22 0621 -- -- -- (!) 115 -- (!) 85 % -- --   10/21/22 0611 94/81 -- -- (!) 123 -- 94 % -- --   10/21/22 0601 85/68 -- -- 99 -- 96 % -- --   10/21/22 0551 98/79 -- -- 98 -- 95 % -- --   10/21/22 0430 104/80 -- -- (!) 107 18 96 % -- --   10/21/22 0421 96/80 97.4 °F (36.3 °C) Temporal 100 16 94 % 5' 6\" (1.676 m) 180 lb (81.6 kg)       Oxygen Saturation Interpretation: Normal    ------------------------------------------ PROGRESS NOTES ------------------------------------------  Re-evaluation(s):   Patients symptoms show no change  Repeat physical examination is not changed    Counseling:  I have spoken with the patient and discussed todays results, in addition to providing specific details for the plan of care and counseling regarding the diagnosis and prognosis. Their questions are answered at this time and they are agreeable with the plan of admission.    --------------------------------- ADDITIONAL PROVIDER NOTES ---------------------------------  Consultations:  Spoke with Dr. Ancelmo Sloan. Discussed case. They will admit the patient. This patient's ED course included: a personal history and physicial examination, re-evaluation prior to disposition, multiple bedside re-evaluations, IV medications, cardiac monitoring, and complex medical decision making and emergency management    This patient has remained hemodynamically stable during their ED course. Diagnosis:  1. Proctocolitis    2. Diverticulosis    3.  Acute kidney injury [facility])        Disposition:  Patient's disposition: Admit to med/surg floor  Patient's condition is Stable        Heriberto Hogan DO  Resident  10/21/22 8286

## 2022-10-21 NOTE — PROGRESS NOTES
Physical Therapy Initial Evaluation/Plan of Care    Room #:  9091/3393-45  Patient Name: Linette Gardiner  YOB: 1945  MRN: [id number]    Date of Service: 10/21/2022     Tentative placement recommendation: Home Health Physical Therapy   Equipment recommendation: To be determined      Evaluating Physical Therapist: Kerry Dowd #[id number]      Specific Provider Orders/Date/Referring Provider :   10/21/22 0600    PT eval and treat  Start:  10/21/22 0600,   End:  10/21/22 0600,   ONE TIME,   Standing Count:  1 Occurrences,   R         Renetta Ruiz DO     Admitting Diagnosis:   Proctocolitis [K52.9]  Diverticulosis [K57.90]  Acute kidney injury ([address] [N17.9]  Idiopathic proctocolitis, other complication ([facility] [address] [S00.776]      Surgery: none  Visit Diagnoses         Codes    Proctocolitis    -  Primary K52.9    Diverticulosis     K57.90    Acute kidney injury ([facility] [address]     N17.9            Patient Active Problem List   Diagnosis    Generalized abdominal pain    Acute proctocolitis ([address]        ASSESSMENT of Current Deficits Patient exhibits decreased strength, balance, and endurance impairing functional mobility, transfers, gait distance, and tolerance to activity lower quadrant abdominal pain haven't had a BM in multiple days. Patient very impulsive and poor safety awareness increase risk of falls. The patient will benefit from continued skilled therapy to increase strength and improve balance for safe functional mobility, to decrease risk of falls, and to meet goals at discharge.         PHYSICAL THERAPY  PLAN OF CARE       Physical therapy plan of care is established based on physician order,  patient diagnosis and clinical assessment    Current Treatment Recommendations:    -Bed Mobility: Lower extremity exercises   -Sitting Balance: Incorporate reaching activities to activate trunk muscles   -Standing Balance: Perform strengthening exercises in standing to promote motor control with or without upper extremity support   -Transfers: Provide instruction on proper hand and foot position for adequate transfer of weight onto lower extremities and use of gait device if needed and Cues for hand placement, technique and safety. Provide stabilization to prevent fall   -Gait: Gait training and Standing activities to improve: base of support, weight shift, weight bearing    -Endurance: Utilize Supervised activities to increase level of endurance to allow for safe functional mobility including transfers and gait   -Stairs: Stair training with instruction on proper technique and hand placement on rail    PT long term treatment goals are located in below grid    Patient and or family understand(s) diagnosis, prognosis, and plan of care. Frequency of treatments: Patient will be seen  daily.          Prior Level of Function: Patient ambulated with cane   Rehab Potential: good  for baseline    Past medical history:   Past Medical History:   Diagnosis Date    Hypertension     Lymphoma ([facility] [address]     remission since 2015     Past Surgical History:   Procedure Laterality Date    APPENDECTOMY      CHOLECYSTECTOMY, OPEN      HYSTERECTOMY (CERVIX STATUS UNKNOWN)      INCISIONAL HERNIA REPAIR         SUBJECTIVE:    Precautions:  falls , impulsive, poor safety awareness    Social history: Patient lives with daughter in a two story home resides first  with 3 steps  to enter with Rail  Tub shower grab bars    Equipment owned: Wheelchair, [facility], and Wheeled Walker,      [address] [place] [address],[address] Floor Mobility Inpatient   How much difficulty turning over in bed?: A Little  How much difficulty sitting down on / standing up from a chair with arms?: A Little  How much difficulty moving from lying on back to sitting on side of bed?: A Little  How much help from another person moving to and from a bed to a chair?: A Little  How much help from another person needed to walk in hospital room?: A Little  How much help from another person for climbing 3-5 steps with a railing?: A Lot  AM-PAC Inpatient Mobility Raw Score : 17  AM-PAC Inpatient T-Scale Score : 42.13  Mobility Inpatient CMS 0-100% Score: 50.57  Mobility Inpatient CMS G-Code Modifier : CK    Nursing cleared patient for PT evaluation. The admitting diagnosis and active problem list as listed above have been reviewed prior to the initiation of this evaluation. OBJECTIVE;   Initial Evaluation  Date: 10/21/2022 Treatment Date:     Short Term/ Long Term   Goals   Was pt agreeable to Eval/treatment? Yes  To be met in 4 days   Pain level   4/10  Abdominal pain     Bed Mobility    Rolling: Minimal assist of 1    Supine to sit: Minimal assist of 1    Sit to supine: Minimal assist of 1    Scooting: Minimal assist of 1    Rolling: Independent    Supine to sit:  Independent    Sit to supine: Independent    Scooting: Independent     Transfers Sit to stand: Minimal assist of 1 from chair and EOB  Sit to stand: Independent    Ambulation     2x15 feet using  wheeled walker with Minimal assist of 1   for walker control, balance, upright, and safety, IV pole management     50 feet using  least restrictive device with Modified Independent    Stair negotiation: ascended and descended   Not assessed     3 steps with HR   ROM Within functional limits    Increase range of motion 10% of affected joints    Strength BUE:  refer to OT eval  RLE:  4-/5  LLE:  4-/5  Increase strength in affected mm groups by 1/3 grade   Balance Sitting EOB:  good -  Dynamic Standing:  fair + wheeled walker    Sitting EOB:  good   Dynamic Standing: good      Patient is Alert & Oriented x person, place, time, and situation and follows directions    Sensation:  Patient  denies numbness/tingling   Edema:  no   Endurance: fair      Vitals: room air   Blood Pressure at rest  Blood Pressure during session    Heart Rate at rest  Heart Rate during session    SPO2 at rest %  SPO2 during session 96%     Patient education  Patient educated on role of Physical Therapy, risks of immobility, safety and plan of care,  importance of mobility while in hospital , ankle pumps, quad set and glut set for edema control, blood clot prevention, importance and purpose of adaptive device and adjusted to proper height for the patient. , and safety      Patient response to education:   Pt verbalized understanding Pt demonstrated skill Pt requires further education in this area   Yes Partial Yes      Treatment:  Patient practiced and was instructed/facilitated in the following treatment: Patient assisted to EOB. Sat edge of bed 10 minutes with Supervision  to increase dynamic sitting balance and activity tolerance. Pt stood ambulated to chair, max cues for safety, very impulsive. Patient stood, bedpan placed under patient, patient stood again, assisted with self hygiene. Pants donned per patient request and then doffed due to tightness per patient request. Patient stood ambulated to door and back to chair. Performed exercises. Resp therapy present at end of session. Therapeutic Exercises:  ankle pumps and long arc quad  x 15 reps. At end of session, patient in chair with alarm call light and phone within reach,  all lines and tubes intact, nursing notified. Patient would benefit from continued skilled Physical Therapy to improve functional independence and quality of life. Patient's/ family goals   home    Time in  1340  Time out  1417    Total Treatment Time  17 minutes    Evaluation time includes thorough review of current medical information, gathering information on past medical history/social history and prior level of function, completion of standardized testing/informal observation of tasks, assessment of data, and development of Plan of care and goals.      CPT codes:  Low Complexity PT evaluation (93499)  Therapeutic activities (21410)   13 minutes  1 unit(s)  Therapeutic exercises (21560)   4 minutes  0 unit(s)    Nusrat Adrian PT

## 2022-10-21 NOTE — VCC REMOTE MONITORING
Spoke with primary RN willian regarding 3 hour  Sepsis bundle.     Nickie Tamayo 20  Callback #[phone]

## 2022-10-21 NOTE — PROGRESS NOTES
Attempted to reach out to family to complete data base. AMRITA left for [facility]. Claus granados is no longer in service.

## 2022-10-22 NOTE — CARE COORDINATION
Ss note: 10/22/01683:11 PM consult noted for plan discharge home tomorrow 10-23-22 please investigate safety of discharge plan with family. Unable to reach dtr Mal Valencia per charting. SW left message for pts granddtr Puja at [phone] requesting return call. Pt stated she resides with her dtr Ira Pizarro. Will await return call from family.  DANIELA Patrick

## 2022-10-22 NOTE — PROGRESS NOTES
Internal Medicine Progress Note    [facility]    Gelacio Martínezmann. Elisabet Garcia., MENDY.COURTNEYOSerenityI. Raheel Russ D.O., NANNETTE Lozada Junior, MSN, APRN, NP-C  Leonor Carcamo. Verner Ink, MSN, APRN-CNP     Primary Care Physician: Madhav Martin MD   Admitting Physician:  Gal Stone DO  Admission date and time: 10/21/2022  4:18 AM    Room:  [address] [place] [address]  Admitting diagnosis: Proctocolitis [K52.9]  Diverticulosis [K57.90]  Acute kidney injury [facility]) [N17.9]  Idiopathic proctocolitis, other complication [facility]) [W22.361]    Patient Name: Tsering Bass  MRN: [id number]    Date of Service: 10/22/2022     Subjective:  Piotr Bowen is a 76 y.o. female who was seen and examined today,10/22/2022, at the bedside. Piotr Bowen became confused overnight but seems more redirectable this morning. She had several bowel movements and describes less abdominal pain. She believes the medications are making her confused. No family members were present during my examination. She has agreed to allow reestablishment of IV access. We discussed IV fluid resuscitation and advancement in her diet. We discussed discharge planning. Far less abdominal pain and discomfort. She had several bowel movements yesterday. She is requesting advancement in her diet. Review of System:   Constitutional:   Denies fever or chills, weight loss or gain, fatigue or malaise. HEENT:   Denies ear pain, sore throat, sinus or eye problems. Cardiovascular:   Denies any chest pain, irregular heartbeats, or palpitations. Respiratory:   Denies shortness of breath, coughing, sputum production, hemoptysis, or wheezing. Gastrointestinal:   Denies nausea, vomiting, diarrhea, or constipation. Denies any abdominal pain. Genitourinary:    Denies any urgency, frequency, hematuria. Voiding  without difficulty. Extremities:   Denies lower extremity swelling, edema or cyanosis.    Neurology:    Denies any headache or focal neurological deficits, Denies generalized weakness or memory difficulty. Psch:   Denies being anxious or depressed. I question early dementia   Musculoskeletal:    Denies  myalgias, joint complaints or back pain. Integumentary:   Denies any rashes, ulcers, or excoriations. Denies bruising. Hematologic/Lymphatic:  Denies bruising or bleeding. Physical Exam:  No intake/output data recorded. Intake/Output Summary (Last 24 hours) at 10/22/2022 0915  Last data filed at 10/22/2022 0433  Gross per 24 hour   Intake 360 ml   Output --   Net 360 ml   I/O last 3 completed shifts: In: 360 [I.V.:360]  Out: -   Patient Vitals for the past 96 hrs (Last 3 readings):   Weight   10/21/22 0421 180 lb (81.6 kg)     Vital Signs:   Blood pressure 104/80, pulse 95, temperature 97.5 °F (36.4 °C), temperature source Oral, resp. rate 18, height 5' 6\" (1.676 m), weight 180 lb (81.6 kg), SpO2 100 %. General appearance:  Alert, responsive, oriented to person, place, and time. Well preserved, alert, no distress. Head:  Normocephalic. No masses, lesions or tenderness. Eyes:  PERRLA. EOMI. Sclera clear. Buccal mucosa moist.  ENT:  Ears normal. Mucosa normal.  Neck:    Supple. Trachea midline. No thyromegaly. No JVD. No bruits. Heart:    Rhythm regular. Rate controlled. No murmurs. Lungs:    Symmetrical. Clear to auscultation bilaterally. No wheezes. No rhonchi. No rales. Abdomen:   Soft. Non-tender. Non-distended. Bowel sounds positive. No organomegaly or masses. No pain on palpation. Extremities:    Peripheral pulses present. No peripheral edema. No ulcers. No cyanosis. No clubbing. Neurologic:    Alert x 3. No focal deficit. Cranial nerves grossly intact. No focal weakness. Psych:   I question early dementia. She is certainly more redirectable but exhibited some signs of sundowning overnight  Musculoskeletal:   Spine ROM normal. Muscular strength intact. Gait not assessed.   Integumentary:  No rashes  Skin normal color and texture.   Genitalia/Breast:  Deferred    Medication:  Scheduled Meds:   cefdinir  300 mg Oral 2 times per day    metroNIDAZOLE  500 mg Oral 3 times per day    apixaban  5 mg Oral Daily    aspirin  81 mg Oral Daily    melatonin  5 mg Oral Nightly    metoprolol succinate  50 mg Oral Nightly    vitamin B-12  1,000 mcg Oral Daily    budesonide  0.5 mg Nebulization BID    Arformoterol Tartrate  15 mcg Nebulization BID    sodium chloride flush  5-40 mL IntraVENous 2 times per day    sennosides-docusate sodium  2 tablet Oral BID    polyethylene glycol  17 g Oral BID    lactulose  20 g Oral BID    ipratropium-albuterol  1 ampule Inhalation Q4H WA    nicotine  1 patch TransDERmal Daily     Continuous Infusions:   sodium chloride      0.9% NaCl with KCl 40 mEq 60 mL/hr at 10/21/22 1332       Objective Data:  CBC with Differential:    Lab Results   Component Value Date/Time    WBC 7.5 10/22/2022 05:09 AM    RBC 4.51 10/22/2022 05:09 AM    HGB 11.9 10/22/2022 05:09 AM    HCT 38.9 10/22/2022 05:09 AM     10/22/2022 05:09 AM    MCV 86.3 10/22/2022 05:09 AM    MCH 26.4 10/22/2022 05:09 AM    MCHC 30.6 10/22/2022 05:09 AM    RDW 16.8 10/22/2022 05:09 AM    LYMPHOPCT 19.2 10/22/2022 05:09 AM    MONOPCT 10.2 10/22/2022 05:09 AM    BASOPCT 0.4 10/22/2022 05:09 AM    MONOSABS 0.76 10/22/2022 05:09 AM    LYMPHSABS 1.43 10/22/2022 05:09 AM    EOSABS 0.02 10/22/2022 05:09 AM    BASOSABS 0.03 10/22/2022 05:09 AM     CMP:    Lab Results   Component Value Date/Time     10/22/2022 05:09 AM    K 3.3 10/22/2022 05:09 AM    K 3.3 07/06/2022 08:30 AM    CL 99 10/22/2022 05:09 AM    CO2 20 10/22/2022 05:09 AM    BUN 65 10/22/2022 05:09 AM    CREATININE 1.6 10/22/2022 05:09 AM    GFRAA >60 07/06/2022 08:30 AM    LABGLOM 33 10/22/2022 05:09 AM    GLUCOSE 139 10/22/2022 05:09 AM    PROT 5.9 10/22/2022 05:09 AM    LABALBU 3.5 10/22/2022 05:09 AM    CALCIUM 8.7 10/22/2022 05:09 AM    BILITOT 1.2 10/22/2022 05:09 AM ALKPHOS 95 10/22/2022 05:09 AM    AST 33 10/22/2022 05:09 AM    ALT 25 10/22/2022 05:09 AM       Assessment:  Acute proctocolitis in the setting of severe constipation  Acute renal insufficiency  History of non-Hodgkin's lymphoma currently in remission  COPD with ongoing tobacco abuse  Early dementia    Plan:   The patient's confusion overnight appears compatible with early dementia. She is more redirectable this morning. We will transition IV antibiotics to oral and advance her diet accordingly. We will maintain IV fluid resuscitation with potassium supplementation. We will asked that the discharge planning team reach out to the family regarding a safe discharge plan. I have encouraged the patient to become increasingly ambulatory today. Continue current therapy. See orders for further plan of care. More than 50% of my  time was spent at the bedside counseling/coordinating care with the patient and/or family with face to face contact. This time was spent reviewing notes and laboratory data as well as instructing and counseling the patient. Time I spent with the family or surrogate(s) is included only if the patient was incapable of providing the necessary information or participating in medical decisions. I also discussed the differential diagnosis and all of the proposed management plans with the patient and individuals accompanying the patient. Shereen Pretty requires this high level of physician care and nursing on the IMC/Telemetry unit due the complexity of decision management and chance of rapid decline or death. Continued cardiac monitoring and higher level of nursing are required. I am readily available for any further decision-making and intervention.      Shanice Sierra DO, F.A.C.O.I.  10/22/2022  9:15 AM

## 2022-10-23 NOTE — PROGRESS NOTES
Physical Therapy        [id number]/[id number]    Patient unavailable for physical therapy treatment due to patient not feeling well today. Patient is to have several tests run today as well. Will attempt session at later time/date.      Reese Hilario, PTA  #[id number]

## 2022-10-23 NOTE — PROGRESS NOTES
Internal Medicine Progress Note    PRESTON=[facility]    Jordan Workman., MENDY.MARIA E.OLIOSerenityI. Rosi Crespo D.O., RICHIEOSerenityISerenity Vasquez D.O. Adam Man, MSN, APRN, NP-C  Alesha Bridges. Dong Coronel, MSN, APRN-CNP     Primary Care Physician: Rayo Rosales MD   Admitting Physician:  Farnaz Smith DO  Admission date and time: 10/21/2022  4:18 AM    Room:  [address] [address]  Admitting diagnosis: Proctocolitis [K52.9]  Diverticulosis [K57.90]  Acute kidney injury [facility]) [N17.9]  Idiopathic proctocolitis, other complication [facility]) [J74.827]    Patient Name: Jer Dumont  MRN: [id number]    Date of Service: 10/23/2022     Subjective:  Ted Frank is a 76 y.o. female who was seen and examined today,10/23/2022, at the bedside. Ted Frank appears increasingly uncomfortable from an abdominal standpoint. She now describes upper quadrant pain bilaterally. She has become increasingly tachycardic. She is grabbing her stomach and pain. She describes nausea with a bout of emesis. She did have approximately 5 bowel movements yesterday. Creatinine is trended upward and she appears uncomfortable. We discussed repeat CT scan of the abdomen and additional laboratory evaluation. No family members were present during my examination. Review of System:   Constitutional:   Very uncomfortable today  HEENT:   Denies ear pain, sore throat, sinus or eye problems. Cardiovascular:   Admits to palpitations but denies overt chest pain. Respiratory:   Some degree of shortness of breath per tickly with deep inspiration largely impacted by her abdominal discomfort  Gastrointestinal:   Increasing upper quadrant abdominal pain today with a bout of emesis. She had multiple bowel movements yesterday. She describes intractable nausea. Genitourinary:    Denies any urgency, frequency, hematuria. Voiding  without difficulty. Extremities:   Denies lower extremity swelling, edema or cyanosis.    Neurology:    Denies any headache or focal neurological deficits, admits to generalized weakness and deconditioning. Psch:   Denies being anxious or depressed. I question early dementia   Musculoskeletal:    Denies  myalgias, joint complaints or back pain. Integumentary:   Denies any rashes, ulcers, or excoriations. Denies bruising. Hematologic/Lymphatic:  Denies bruising or bleeding. Physical Exam:  I/O this shift:  In: -   Out: 125 [Emesis/NG output:125]    Intake/Output Summary (Last 24 hours) at 10/23/2022 1007  Last data filed at 10/23/2022 0803  Gross per 24 hour   Intake 795 ml   Output 125 ml   Net 670 ml   I/O last 3 completed shifts: In: 1593 [P.O.:120; I.V.:1035]  Out: -   Patient Vitals for the past 96 hrs (Last 3 readings):   Weight   10/21/22 0421 180 lb (81.6 kg)     Vital Signs:   Blood pressure (!) 122/56, pulse (!) 120, temperature 97.6 °F (36.4 °C), temperature source Oral, resp. rate 16, height 5' 6\" (1.676 m), weight 180 lb (81.6 kg), SpO2 97 %. General appearance:  Alert, responsive, oriented to person, place, and time. Well preserved, alert, no distress. Head:  Normocephalic. No masses, lesions or tenderness. Eyes:  PERRLA. EOMI. Sclera clear. Buccal mucosa moist.  ENT:  Ears normal. Mucosa normal.  Neck:    Supple. Trachea midline. No thyromegaly. No JVD. No bruits. Heart:    Rhythm regular. Rate controlled. No murmurs. Lungs:    Symmetrical. Clear to auscultation bilaterally. No wheezes. No rhonchi. No rales. Abdomen:   Soft. Tender to palpation diffusely with voluntary guarding elicited. Abdominal pain now localizes to the upper quadrants. Bowel sounds are hypoactive. No peritoneal signs exist.  Extremities:    Peripheral pulses present. No peripheral edema. No ulcers. No cyanosis. No clubbing. Neurologic:    Alert x 3. No focal deficit. Cranial nerves grossly intact. No focal weakness. Psych:   I question early dementia.   She is certainly more redirectable but exhibited some signs of sundowning overnight  Musculoskeletal:   Spine ROM normal. Muscular strength intact. Gait not assessed. Integumentary:  No rashes  Skin normal color and texture.   Genitalia/Breast:  Deferred    Medication:  Scheduled Meds:   cefTRIAXone (ROCEPHIN) IV  1,000 mg IntraVENous Q24H    metroNIDAZOLE  500 mg IntraVENous Q8H    enoxaparin  1 mg/kg SubCUTAneous Daily    pantoprazole  40 mg IntraVENous BID    [Held by provider] apixaban  5 mg Oral Daily    aspirin  81 mg Oral Daily    melatonin  5 mg Oral Nightly    metoprolol succinate  50 mg Oral Nightly    vitamin B-12  1,000 mcg Oral Daily    budesonide  0.5 mg Nebulization BID    Arformoterol Tartrate  15 mcg Nebulization BID    sodium chloride flush  5-40 mL IntraVENous 2 times per day    sennosides-docusate sodium  2 tablet Oral BID    polyethylene glycol  17 g Oral BID    lactulose  20 g Oral BID    ipratropium-albuterol  1 ampule Inhalation Q4H WA    nicotine  1 patch TransDERmal Daily     Continuous Infusions:   IV infusion builder      sodium chloride         Objective Data:  CBC with Differential:    Lab Results   Component Value Date/Time    WBC 9.2 10/23/2022 05:00 AM    RBC 4.68 10/23/2022 05:00 AM    HGB 12.3 10/23/2022 05:00 AM    HCT 40.2 10/23/2022 05:00 AM     10/23/2022 05:00 AM    MCV 85.9 10/23/2022 05:00 AM    MCH 26.3 10/23/2022 05:00 AM    MCHC 30.6 10/23/2022 05:00 AM    RDW 17.2 10/23/2022 05:00 AM    LYMPHOPCT 19.2 10/23/2022 05:00 AM    MONOPCT 10.8 10/23/2022 05:00 AM    BASOPCT 0.3 10/23/2022 05:00 AM    MONOSABS 0.99 10/23/2022 05:00 AM    LYMPHSABS 1.76 10/23/2022 05:00 AM    EOSABS 0.01 10/23/2022 05:00 AM    BASOSABS 0.03 10/23/2022 05:00 AM     CMP:    Lab Results   Component Value Date/Time     10/23/2022 05:00 AM    K 4.8 10/23/2022 05:00 AM    K 3.3 07/06/2022 08:30 AM     10/23/2022 05:00 AM    CO2 17 10/23/2022 05:00 AM    BUN 74 10/23/2022 05:00 AM    CREATININE 2.0 10/23/2022 05:00 AM    GFRAA >60 07/06/2022 08:30 AM    LABGLOM 25 10/23/2022 05:00 AM    GLUCOSE 144 10/23/2022 05:00 AM    PROT 6.1 10/23/2022 05:00 AM    LABALBU 3.6 10/23/2022 05:00 AM    CALCIUM 9.0 10/23/2022 05:00 AM    BILITOT 1.0 10/23/2022 05:00 AM    ALKPHOS 99 10/23/2022 05:00 AM    AST 53 10/23/2022 05:00 AM    ALT 41 10/23/2022 05:00 AM       Assessment:  Acute proctocolitis in the setting of severe constipation with worsening abdominal pain today  Acute renal failure  History of non-Hodgkin's lymphoma currently in remission  COPD with ongoing tobacco abuse  Early dementia    Plan:   I am concerned with the patient's clinical appearance today. We will attempt a oral contrasted CT scan, but if she is unable to tolerate the oral contrast we will move forward with a totally noncontrasted study (no IV secondary to ARF). Stat lactic acid will be obtained. IV fluids will be adjusted to account for her worsening renal function and evolving acidosis. Vargas catheter will be placed to monitor strict I's and O's. Adequate pain control will be achieved. She will be transition to n.p.o. status. Protonix will be administered. I will have a low threshold for transfer to a higher level of care pending CT results and clinical progression as the day persists. Additional work-up will be undertaken including 2D echocardiogram and troponin cycling along with EKG. More than 50% of my  time was spent at the bedside counseling/coordinating care with the patient and/or family with face to face contact. This time was spent reviewing notes and laboratory data as well as instructing and counseling the patient. Time I spent with the family or surrogate(s) is included only if the patient was incapable of providing the necessary information or participating in medical decisions. I also discussed the differential diagnosis and all of the proposed management plans with the patient and individuals accompanying the patient.     Hina Cuevas requires this high level of physician care and nursing on the IMC/Telemetry unit due the complexity of decision management and chance of rapid decline or death. Continued cardiac monitoring and higher level of nursing are required. I am readily available for any further decision-making and intervention.      Ami Saul DO, F.A.C.O.I.  10/23/2022  10:07 AM

## 2022-10-24 NOTE — CONSULTS
[address] [place] [address]  Phone [phone]   [phone]      Admit Date: 10/21/2022  4:18 AM  Pt Name: Enrique Casey  MRN: [id number]  : 1945  Reason for Consult:    Chief Complaint   Patient presents with    Abdominal Pain     Was seen at [facility] yesterday, not feeling any better at this time, when asked regarding pain, patient states I feel a lump here pointing to scar right mid to lower quadrant, states from previous hernia. Requesting Physician:  Joan Ceja DO  PCP: Fredy Tim MD  History Obtained From:  patient, chart   ID consulted for Proctocolitis [K52.9]  Diverticulosis [K57.90]  Acute kidney injury ([address] [N17.9]  Idiopathic proctocolitis, other complication ([address] [W24.126]  on hospital day [address] [facility] [place]       Chief Complaint   Patient presents with    Abdominal Pain     Was seen at [facility] yesterday, not feeling any better at this time, when asked regarding pain, patient states I feel a lump here pointing to scar right mid to lower quadrant, states from previous hernia. HISTORYOF PRESENT ILLNESS   Enrique Casey is a 76 y.o. female who presents with   has a past medical history of Hypertension and Lymphoma ([address]    ED TRIAGEVITALS  BP: 93/81, Temp: 97.7 °F (36.5 °C), Heart Rate: (!) 108, Resp: 18, SpO2: 97 %  HPI:  ID was consulted on 10/24/22 for infection management  Pt presented to ER on 10/21/2022 with diagnosis of  Proctocolitis [K52.9]  Diverticulosis [K57.90]  Acute kidney injury ([address] [N17.9]  Idiopathic proctocolitis, other complication (HCC) [J55.834]    AFEBRILE  PT HAS SITTER  HAS NO C/O F/C/N/V/D/  HAS PAIN RUQ  MAGGI CR1.5->2.5 WBC9.7   BLOOD CX CONS     Currently oN  piperacillin-tazobactam (ZOSYN) 3,375 mg in sodium chloride 0.9 % 50 mL IVPB ([id number]), Q12H  vancomycin (VANCOCIN) 1,500 mg in dextrose 5 % 300 mL IVPB, Once     SEEN BY SURGERY proctocolitis and possible duodenitis  SEEN BY GI DUODENITIS/CONSTIPATION  SEEN BY RENAL IntraVENous, 2 times per day, Sanya Alberts, DO    sodium chloride flush 0.9 % injection 5-40 mL, 5-40 mL, IntraVENous, PRN, Sanya Alberts, DO    0.9 % sodium chloride infusion, , IntraVENous, PRN, Sanya Alberts, DO    heparin flush 100 UNIT/ML injection 100 Units, 1 mL, IntraVENous, 2 times per day, Sanya Alberts, DO    heparin flush 100 UNIT/ML injection 100 Units, 1 mL, IntraCATHeter, PRN, Sanya Alberts, DO    enoxaparin (LOVENOX) injection 80 mg, 1 mg/kg, SubCUTAneous, Daily, Delwyn Slider, APRN - CNP, 80 mg at 10/23/22 0855    potassium chloride 20 mEq, sodium bicarbonate 50 mEq in sodium chloride 0.45 % 1,000 mL infusion, , IntraVENous, Continuous, Sanya Alberts, DO, Last Rate: 100 mL/hr at 10/24/22 0458, New Bag at 10/24/22 0458    fentaNYL (SUBLIMAZE) injection 25 mcg, 25 mcg, IntraVENous, Q4H PRN, Sanya Alberts, DO, 25 mcg at 10/23/22 1140    [Held by provider] apixaban (ELIQUIS) tablet 5 mg, 5 mg, Oral, Daily, Sanya Alberts, DO    [Held by provider] aspirin EC tablet 81 mg, 81 mg, Oral, Daily, Sanya Alberts, DO, 81 mg at 10/22/22 3559    vitamin B-12 (CYANOCOBALAMIN) tablet 1,000 mcg, 1,000 mcg, Oral, Daily, Sanya Alberts, DO, 1,000 mcg at 10/22/22 0825    albuterol (PROVENTIL) nebulizer solution 2.5 mg, 2.5 mg, Nebulization, Q4H PRN, Sanya Alberts, DO    budesonide (PULMICORT) nebulizer suspension 500 mcg, 0.5 mg, Nebulization, BID, Sanya Alberts, DO, 500 mcg at 10/24/22 0645    magnesium sulfate 1000 mg in dextrose 5% 100 mL IVPB, 1,000 mg, IntraVENous, PRN, Sanya Alberts, DO    sodium phosphate 13.05 mmol in sodium chloride 0.9 % 250 mL IVPB, 0.16 mmol/kg, IntraVENous, PRN **OR** sodium phosphate 26.1 mmol in sodium chloride 0.9 % 500 mL IVPB, 0.32 mmol/kg, IntraVENous, PRN, Sanya Alberts, DO    potassium chloride (KLOR-CON M) extended release tablet 40 mEq, 40 mEq, Oral, PRN **OR** potassium bicarb-citric acid (EFFER-K) effervescent tablet 40 mEq, 40 mEq, Oral, PRN, 40 mEq at 10/22/22 0858 **OR** potassium chloride 10 mEq/100 mL IVPB (Peripheral Line), 10 mEq, IntraVENous, PRN, Tracey José Miguel, DO    Arformoterol Tartrate (BROVANA) nebulizer solution 15 mcg, 15 mcg, Nebulization, BID, Tracey José Miguel, DO, 15 mcg at 10/24/22 0645    sodium chloride flush 0.9 % injection 5-40 mL, 5-40 mL, IntraVENous, 2 times per day, Tracey José Miguel, DO, 10 mL at 10/23/22 2140    sodium chloride flush 0.9 % injection 5-40 mL, 5-40 mL, IntraVENous, PRN, Tracey José Miguel, DO    0.9 % sodium chloride infusion, , IntraVENous, PRN, Tracey José Miguel, DO    ondansetron (ZOFRAN-ODT) disintegrating tablet 4 mg, 4 mg, Oral, Q8H PRN **OR** ondansetron (ZOFRAN) injection 4 mg, 4 mg, IntraVENous, Q6H PRN, Tracey José Miguel, DO, 4 mg at 10/23/22 2141    acetaminophen (TYLENOL) tablet 650 mg, 650 mg, Oral, Q6H PRN **OR** acetaminophen (TYLENOL) suppository 650 mg, 650 mg, Rectal, Q6H PRN, Tracey José Miguel, DO    sennosides-docusate sodium (SENOKOT-S) 8.6-50 MG tablet 2 tablet, 2 tablet, Oral, BID, Tracey José Miguel, DO, 2 tablet at 10/23/22 2141    polyethylene glycol (GLYCOLAX) packet 17 g, 17 g, Oral, BID, Tracey José Miguel, DO, 17 g at 10/23/22 2140    bisacodyl (DULCOLAX) EC tablet 5 mg, 5 mg, Oral, Daily PRN, Tracey José Miguel, DO    lactulose (CHRONULAC) 10 GM/15ML solution 20 g, 20 g, Oral, BID, Tracey José Miguel, DO, 20 g at 10/23/22 2141    ipratropium-albuterol (DUONEB) nebulizer solution 1 ampule, 1 ampule, Inhalation, Q4H WA, Tracey José Miguel, DO, 1 ampule at 10/24/22 0645    nicotine (NICODERM CQ) 21 MG/24HR 1 patch, 1 patch, TransDERmal, Daily, [employer], DO, 1 patch at 10/23/22 3660  ALLERGIES     Bactrim [sulfamethoxazole-trimethoprim], Ibuprofen, and Codeine  Immunization History   Administered Date(s) Administered    COVID-19, PFIZER Bivalent BOOSTER, (age 12y+), IM, 30 mcg/0.3 mL dose 09/15/2022    COVID-19, PFIZER PURPLE top, DILUTE for use, (age 15 y+), 30mcg/0.3mL 05/30/2021, 06/20/2021, 12/21/2021      Internal Administration   First Dose COVID-19, PFIZER PURPLE top, DILUTE for use, (age 15 y+), 30mcg/0.3mL  05/30/2021   Second Dose COVID-19, PFIZER PURPLE top, DILUTE for use, (age 15 y+), 30mcg/0.3mL   06/20/2021       Last COVID Lab SARS-CoV-2, NAAT (no units)   Date Value   07/05/2022 Not Detected          PAST MEDICAL HISTORY     Past Medical History:   Diagnosis Date    Hypertension     Lymphoma ([address]     remission since 2015     SURGICAL HISTORY       Past Surgical History:   Procedure Laterality Date    APPENDECTOMY      CHOLECYSTECTOMY, OPEN      HYSTERECTOMY (CERVIX STATUS UNKNOWN)      INCISIONAL HERNIA REPAIR       FAMILY HISTORY     History reviewed. No pertinent family history. SOCIAL HISTORY       Social History     Socioeconomic History    Marital status:      Spouse name: None    Number of children: None    Years of education: None    Highest education level: None   Tobacco Use    Smoking status: Every Day     Packs/day: 2.00     Types: Cigarettes    Smokeless tobacco: Never   Substance and Sexual Activity    Alcohol use: No    Drug use: Never     PHYSICAL EXAM        Vitals:    Vitals:    10/24/22 0458 10/24/22 0607 10/24/22 0645 10/24/22 0735   BP:  101/75  93/81   Pulse:  (!) 143  (!) 108   Resp:  19  18   Temp:  97.7 °F (36.5 °C)  97.7 °F (36.5 °C)   TempSrc:  Oral  Oral   SpO2:  98% 99% 97%   Weight: 196 lb (88.9 kg)      Height:            CONSTITUTIONAL:  awake, alert, cooperative, no apparent distress, and appears stated age  ENT:  Normocephalic, without obvious abnormality, atraumatic,   LUNGS:  No increased work of breathing, good air exchange, clear to auscultation bilaterally, no crackles or wheezing  CARDIOVASCULAR:  Normal apical impulse, regular rate and rhythm, normal S1 and S  ABDOMEN:  No scars, normal bowel sounds, soft, non-distended, non-tender, no masses palpated   CHEST/BREASTS:  Breasts symmetrical   GENITAL/URINARY: bustamante  MUSCULOSKELETAL:  There is no redness, warmth, or swelling of the joints. Full range of motion noted.     NEUROLOGIC:  Awake, alert, oriented to name, place and time. Cranial nerves II-XII are grossly intact. SKIN:  no bruising or bleeding and normal skin color, texture, turgor        Peripheral Intravenous Line:  Peripheral IV 10/23/22 Left Forearm (Active)   Site Assessment Clean, dry & intact 10/24/22 0500   Line Status Infusing 10/24/22 0500   Line Care Connections checked and tightened 10/24/22 0500   Phlebitis Assessment No symptoms 10/24/22 0500   Infiltration Assessment 0 10/24/22 0500   Alcohol Cap Used No 10/24/22 0500   Dressing Status Clean, dry & intact 10/24/22 0500   Dressing Type Transparent 10/24/22 0500          Drain(s):  Urinary Catheter 10/23/22 Vargas (Active)   $ Urethral catheter insertion $ Not inserted for procedure 10/23/22 1030   Catheter Indications Urinary retention (acute or chronic), continuous bladder irrigation or bladder outlet obstruction; Need for fluid volume management of the critically ill patient in a critical care setting 10/24/22 0440   Site Assessment Pink 10/24/22 0440   Urine Color Sally 10/24/22 0440   Urine Appearance Hazy 10/24/22 0440   Collection Container Standard 10/23/22 2140   Securement Method Leg strap 10/24/22 0440   Catheter Care Completed Yes 10/24/22 0440   Catheter Best Practices  Drainage tube clipped to bed;Catheter secured to thigh; Bag below bladder;Bag not on floor; Lack of dependent loop in tubing;Drainage bag less than half full 10/23/22 2140   Status Draining 10/24/22 0440   Output (mL) 200 mL 10/24/22 0019          DIAGNOSTIC RESULTS   RADIOLOGY:   CT ABDOMEN PELVIS WO CONTRAST Additional Contrast? None    Result Date: 10/23/2022  EXAMINATION: CT OF THE ABDOMEN AND PELVIS WITHOUT CONTRAST 10/23/2022 12:59 pm TECHNIQUE: CT of the abdomen and pelvis was performed without the administration of intravenous contrast. Multiplanar reformatted images are provided for review.  Automated exposure control, iterative reconstruction, and/or weight based adjustment of the mA/kV was utilized to reduce the radiation dose to as low as reasonably achievable. COMPARISON: October 21, 2022 HISTORY: ORDERING SYSTEM PROVIDED HISTORY: worsening abdominal pain, RUQ tenderness, nausea and vomiting TECHNOLOGIST PROVIDED HISTORY: Reason for exam:->worsening abdominal pain, RUQ tenderness, nausea and vomiting Additional Contrast?->None FINDINGS: Irregular areas of attenuation seen along inferior margin of the liver and at level of beto hepatis, and pancreatic head. Area of hypoattenuation in seen at level of the beto hepatis at level of common hepatic duct measures approximately 3.4 x 3.2 cm. There is a history of cholecystectomy. PICC gray attic duct is nondilated. There is increased perihepatic ascites as well as ascites along left paracolic gutter and present within the pelvis. No evidence of free air. Numerous diverticula are seen involving the sigmoid colon. Spleen is diminutive in size. No hydronephrosis. There is nonspecific bilateral perinephric fat stranding. Ectatic infrarenal abdominal aorta measuring up to 2.9 cm. Severe atherosclerotic plaque associated with the abdominal aorta extending into the iliac vessels. The appendix is not visualized consistent with history of appendectomy. Vargas catheter is present. New small right pleural effusion. Anterolisthesis of L4 on L5 of approximately 1 cm. Severe central canal stenosis at L4/L5     1. Area of hypoattenuation at level of beto hepatis measures approximately 3.4 x 3.2 cm which may be related to ascites at level of gallbladder bed, mass, or dilated hepatic duct. MRI with without contrast recommended for further evaluation. 2.  New inflammatory changes seen along inferior margin of the liver, beto hepatis and at level of pancreatic head. This finding could be assessed further with MRI. 3.  Increased ascites. 4.  Diverticulosis notable level of sigmoid colon.  5.  Thickened wall of the rectum appearing similar compared to prior and could indicate proctitis. 6.  Ectatic infrarenal abdominal aorta measures up to 2.9 cm. 7.  New small right pleural effusion. XR CHEST (2 VW)    Result Date: 10/21/2022  EXAMINATION: TWO XRAY VIEWS OF THE CHEST 10/21/2022 5:08 am COMPARISON: July 6, 2022 HISTORY: ORDERING SYSTEM PROVIDED HISTORY: abdominal pain TECHNOLOGIST PROVIDED HISTORY: Reason for exam:->abdominal pain FINDINGS: Moderate cardiomegaly. Mild perihilar pulmonary edema. No effusion or pneumothorax. Left subclavian port catheter tip remains well position at the cavoatrial junction. Osseous thorax intact. No acute disease. RECOMMENDATION: Careful clinical correlation and follow up recommended. CT ABDOMEN PELVIS W IV CONTRAST Additional Contrast? None    Result Date: 10/21/2022  EXAMINATION: CT OF THE ABDOMEN AND PELVIS WITH CONTRAST 10/21/2022 4:45 am TECHNIQUE: CT of the abdomen and pelvis was performed with the administration of intravenous contrast. Multiplanar reformatted images are provided for review. Automated exposure control, iterative reconstruction, and/or weight based adjustment of the mA/kV was utilized to reduce the radiation dose to as low as reasonably achievable. COMPARISON: None. HISTORY: ORDERING SYSTEM PROVIDED HISTORY: abdominal pain TECHNOLOGIST PROVIDED HISTORY: Additional Contrast?->None Reason for exam:->abdominal pain Decision Support Exception - unselect if not a suspected or confirmed emergency medical condition->Emergency Medical Condition (MA) FINDINGS: Lower Chest: Moderate cardiomegaly. No effusions. Aortic atherosclerosis. Organs: Probable cholecystectomy. Otherwise, liver, biliary tree, bilateral adrenal glands, bilateral kidneys, spleen and pancreas are normal. GI/Bowel: No ileus or obstruction. Mild sigmoid diverticulosis. Perirectal thickening and mild surrounding fat infiltration compatible with mild proctocolitis. No abscess. Pelvis: No adenopathy.   Small intrapelvic free fluid, fluid infiltration surrounding the rectum. Peritoneum/Retroperitoneum: No adenopathy. Trace intra-ascites layering in the bilateral pericolic gutters and Medina's pouch. Bones/Soft Tissues: No acute fracture or subluxation. Chronic mild 7 mm anterolisthesis L4 on L5 secondary to severe facet degenerative change at this level. 1. Mild proctocolitis. 2. Trace ascites. RECOMMENDATIONS: Careful clinical correlation and follow up recommended. MRI ABDOMEN WO CONTRAST MRCP    Result Date: 10/23/2022  EXAMINATION: MRI OF THE ABDOMEN WITHOUT CONTRAST AND MRCP 10/23/2022 4:37 pm TECHNIQUE: Multiplanar multisequence MRI of the abdomen was performed without the administration of intravenous contrast.  After initial T2 axial and coronal images, thick slab, thin slab and 3D coronal MRCP sequences were obtained without the administration of intravenous contrast.  3D/MIP images are provided for review. COMPARISON: CT abdomen and pelvis dated 10/23/2022 HISTORY: ORDERING SYSTEM PROVIDED HISTORY: due to results of CT TECHNOLOGIST PROVIDED HISTORY: Reason for exam:->due to results of CT What is the sedation requirement?->None FINDINGS: Lung bases reveal small right pleural effusion. Liver without focal liver lesions specifically no T2 hyperintense focus of abnormality. Perihepatic ascites. Gallbladder: Not identified and probable surgically absent Bile Ducts: No intrahepatic biliary dilatation. Common bile duct poorly visualized. Pancreatic Duct: Poorly visualized and grossly unremarkable in caliber Other:  Edema surrounding the duodenal C-loop and head of the pancreas with a least minimal inflammatory stranding concerning for duodenitis versus groove pancreatitis. Small volume ascites perisplenic and perihepatic. Fluid in the partially visualized bilateral gutters. Adrenals and kidneys unremarkable for with simple right renal cortical cysts. No hydronephrosis. No bulky retroperitoneal adenopathy.   No aggressive bone marrow signal findings. No soft tissue body wall findings other than mild to moderate body wall edema. Edema surrounding the duodenal C-loop and head of the pancreas with a least minimal inflammatory stranding concerning for duodenitis versus groove pancreatitis. Small volume ascites perisplenic and perihepatic. Fluid in the partially visualized bilateral gutters. Motion artifact and limited evaluation on MRCP of the biliary tree combined with lack of intravenous contrast limits evaluation however no obvious abscess or focal loculated collection apart from edema and ascites. Gallbladder nonvisualized and likely surgically absent. Correlation with lipase values for pancreatitis involvement Body wall edema     US RETROPERITONEAL COMPLETE    Result Date: 10/23/2022  EXAMINATION: RETROPERITONEAL ULTRASOUND OF THE KIDNEYS AND URINARY BLADDER 10/23/2022 COMPARISON: None HISTORY: ORDERING SYSTEM PROVIDED HISTORY: ARF TECHNOLOGIST PROVIDED HISTORY: Reason for exam:->ARF What reading provider will be dictating this exam?->CRC FINDINGS: Kidneys: The right kidney measures 9.7 cm in length and the left kidney measures 8.9 cm in length. Cortex within normal limits measuring 7 mm on the right and 9 mm on the left. Kidneys demonstrate normal cortical echogenicity. No evidence of hydronephrosis or intrarenal stones. However, there is cyst lower pole left kidney measuring 1.6 x 1.4 x 1.1 cm. Simple appearing cyst lower pole left kidney. Otherwise, negative ultrasound of the kidneys.      LABS  Recent Labs     10/22/22  0509 10/23/22  0500 10/24/22  0544   WBC 7.5 9.2 9.7   HGB 11.9 12.3 12.3   HCT 38.9 40.2 39.4   MCV 86.3 85.9 86.0    339 310     Recent Labs     10/22/22  0509 10/23/22  0500 10/23/22  1608 10/23/22  2208 10/24/22  0544    137 141 143 142   K 3.3* 4.8 4.8 4.6 4.7   CL 99 100 102 103 104   CO2 20* 17* 19* 20* 21*   BUN 65* 74* 84* 87* 91*   CREATININE 1.6* 2.0* 2.2* 2.4* 2.5*   LABGLOM 33 25 22 20 19   GLUCOSE 139* 144* 133* 120* 133*   PROT 5.9* 6.1*  --   --  5.8*   LABALBU 3.5 3.6  --   --  3.4*   CALCIUM 8.7 9.0 8.8 8.9 8.8   BILITOT 1.2 1.0  --   --  1.2   ALKPHOS 95 99  --   --  92   AST 33* 53*  --   --  88*   ALT 25 41*  --   --  65*        Lab Results   Component Value Date/Time    COVID19 Not Detected 07/05/2022 01:57 PM        Lab Results   Component Value Date/Time    COVID19 Not Detected 07/05/2022 01:57 PM     COVID-19/DIGNA-COV2 LABS  Recent Labs     10/22/22  0509 10/23/22  0500 10/24/22  0544   AST 33* 53* 88*   ALT 25 41* 65*   TRIG 83  --   --      Lab Results   Component Value Date/Time    CHOL 140 10/22/2022 05:09 AM    TRIG 83 10/22/2022 05:09 AM    HDL 21 10/22/2022 05:09 AM    LDLCALC 102 10/22/2022 05:09 AM    LABVLDL 17 10/22/2022 05:09 AM        MICROBIOLOGY:       Recent Labs     10/21/22  1012   BLOODCULT2 24 Hours no growth        FINAL IMPRESSION    Patient is a 76 y.o. female who presented with   Chief Complaint   Patient presents with    Abdominal Pain     Was seen at [facility] yesterday, not feeling any better at this time, when asked regarding pain, patient states I feel a lump here pointing to scar right mid to lower quadrant, states from previous hernia. and admitted for Proctocolitis [K52.9]  Diverticulosis [K57.90]  Acute kidney injury ([facility] [N17.9]  Idiopathic proctocolitis, other complication ([facility] [F99.390]  Gpc clusters BACTEREMIA LOOKS LIKE CONS prob contaminant  proctocolitis and possible duodenitis  Cont atbx   Follow labs     piperacillin-tazobactam (ZOSYN) 3,375 mg in sodium chloride 0.9 % 50 mL IVPB ([id number]), Q12H  vancomycin (VANCOCIN) 1,500 mg in dextrose 5 % 300 mL IVPB, Once    Available labs, imaging studies, microbiologic studies have been reviewed. An opportunity to ask questions was given to the patient/FAMILY and questions were answered. Thank you for involving me in the care of José Miguel Alexsander.  Please do not hesitate to call [phone]  for any questions or concerns.          Electronically signed by Liset Pineda MD on 10/24/2022 at 8:51 AM

## 2022-10-24 NOTE — PROGRESS NOTES
OT BEDSIDE TREATMENT NOTE      Date:10/24/2022  Patient Name: Jatinder Perkins  MRN: [id number]  : 1945  Room: [address] [place] [address]        Evaluating OT: PRATIK Patricia/RANGEL; [id number]        Referring Provider and Orders/Date:     OT eval and treat  Start:  10/21/22 1100,   End:  10/21/22 1100,   ONE TIME,   Standing Count:  1 Occurrences,   R         Antonio Sepulveda DO         Diagnosis:   1. Proctocolitis    2. Diverticulosis    3.  Acute kidney injury [facility])          Surgery: none      Pertinent Medical History:        Past Medical History        Past Medical History:   Diagnosis Date    Hypertension      Lymphoma ([facility] [address]       remission since              Past Surgical History         Past Surgical History:   Procedure Laterality Date    APPENDECTOMY        CHOLECYSTECTOMY, OPEN        HYSTERECTOMY (CERVIX STATUS UNKNOWN)        INCISIONAL HERNIA REPAIR               Precautions:  Fall Risk, impulsive, increased HR, currently NPO    Recommended placement: subacute    Assessment of current deficits     [x] Functional mobility           [x]ADLs           [x] Strength                  [x]Cognition     [x] Functional transfers         [x] IADLs         [x] Safety Awareness   [x]Endurance     [] Fine Coordination                        [x] Balance      [] Vision/perception   []Sensation       [x]Gross Motor Coordination            [] ROM           [] Delirium                   [] Motor Control      OT PLAN OF CARE   OT POC based on physician orders, patient diagnosis and results of clinical assessment     Frequency/Duration 1-3 days/wk for 2 weeks PRN   Specific OT Treatment Interventions to include:   * Instruction/training on adapted ADL techniques and AE recommendations to increase functional independence within precautions       * Training on energy conservation strategies, correct breathing pattern and techniques to improve independence/tolerance for self-care routine  * Functional transfer/mobility training/DME recommendations for increased independence, safety, and fall prevention  * Patient/Family education to increase follow through with safety techniques and functional independence  * Recommendation of environmental modifications for increased safety with functional transfers/mobility and ADLs  * Cognitive retraining/development of therapeutic activities to improve problem solving, judgement, memory, and attention for increased safety/participation in ADL/IADL tasks  * Therapeutic exercise to improve motor endurance, ROM, and functional strength for ADLs/functional transfers  * Therapeutic activities to facilitate/challenge dynamic balance, stand tolerance for increased safety and independence with ADLs  * Therapeutic activities to facilitate gross/fine motor skills for increased independence with ADLs  * Neuro-muscular re-education: facilitation of righting/equilibrium reactions, midline orientation, scapular stability/mobility, normalization of muscle tone, and facilitation of volitional active controled movement  * Positioning to improve skin integrity, interaction with environment and functional independence      Recommended Adaptive Equipment/DME:  TBD       Home Living: Pt lives at home with daughter who has a brain trauma from a few years(hx of aneurysm). 2 story home 3 steps to enter with rails. First floor set up.      Bathroom setup: tub shower with shower chair if needed and standard toilet    DME owned: cane, ww, wc      Prior Level of Function: indep with ADLs but not thorough or consistent, assist with IADLs; ambulated with cane   Driving: no   Occupation: none   Enjoys: watching TV, limited since CA diagnosis      Pain Level: no c/o pain at this time  Cognition: A&O: 3/4; Follows 2 step directions              Memory:  fair-              Sequencing:  fair-              Problem solving:  poor+              Judgement/safety:  poor+     AM-PAC Daily Activity Inpatient   How much help for putting on and taking off regular lower body clothing?: A Lot  How much help for Bathing?: A Lot  How much help for Toileting?: A Little  How much help for putting on and taking off regular upper body clothing?: A Little  How much help for taking care of personal grooming?: A Little  How much help for eating meals?: A Little  AM-[facility] Inpatient Daily Activity Raw Score: 16  AM-PAC Inpatient ADL T-Scale Score : 35.96  ADL Inpatient CMS 0-100% Score: 53.32  ADL Inpatient CMS G-Code Modifier : CK                Functional Assessment:      Initial Eval Status  Date: 10/21/2022   Treatment Status  Date:10/24/22 STGs = LTGs  Time frame: 10-14 days   Feeding Supervision with drinking and impulsivity   N/T d/t pt is NPO; pt able to swab mouth with toothette Indep   Grooming Stand by Assist at sink for hand wash and hair comb Pt able to wash face with set up assist; pt had assist to brush hair prior to session; pt declined oral hygiene 2* to pt not having teeth with her  Independent    UB Dressing Minimal Assist with gown management Min A for gown management; pt able to thread BUE's through sleeves; assist to tie/untie back of gown  Independent    LB Dressing Moderate Assist with socks and pants to thread over R foot and standing balance N/T Stand by Assist    Bathing Moderate Assist with sponge bathing from sitting/standing due to impulsivity  Mod A ; pt able to wash UB and complete pericare and rear hygiene when side rolling in bed; assist for washing LE's; assist for thoroughness with rear hygiene 2* to slight incontinence of bowel  Stand by Assist    Toileting Minimal Assist for safety and balance with cane Min/mod A for hygiene when side rolling in bed d/t slight incontinence of bowel  Stand by Assist    Bed Mobility  Pt up in arm chair on arrival. Pt required mod A to support UB to sitting in bed unsupported x 2 reps when completing ADL tasks ; max A X 2 to scoot to [facility] with use of chux pad as sling and bed in trendelenburg position; d/t increased HR , nsg requested in bed activities only Supine to sit: Independent   Sit to supine: Independent    Functional Transfers Minimal Assist from arm chair and toilet with cane N/T  Modified [place]rie    Functional Mobility Minimal Assist with cane for short distance functional mobility throughout the room to simulate house hold distances. N/T  Modified G[place]rie    Balance Sitting:     Static:  fair+    Dynamic:fair  Standing: fair- Sitting:     Static:  fair+    Dynamic:fair  Standing: N/T Sitting:     Static:  good    Dynamic:fair+  Standing: fair   Activity Tolerance Vitals with activity:room air with fair- tolerance and signs of SOB per \"smoking\"; 95% HR74; standing tolerance 1min average    Pt's O2 sats on 2L O2 ranged in 90%'s ; pt's HR ranged from 117-145 BPM during session; nsg aware Increase standing tolerance for >3min with stable vital signs for carry over into toileting, functional tranfers and indep in ADLs   Visual/  Perceptual Glasses: Present; WFL     Reports change in vision since admission: No      NA      Hand Dominance  [x] Right   [] Left   [facility]: [email or web address]   Sensation:  No c/o numbness or tingling   Tone: WFL   Edema: none    Comments: Patient cleared by nursing staff for in bed activities only 2* to increased HR. Upon arrival pt supine in bed with HOB partially elevated. Pt agreeable to OT tx session. Pt educated with regards to bed mobility, hand placement, safety awareness, static sitting balance,  ADL retraining,  grooming tasks, UE/LE bathing, UE dressing, toileting/hygiene, ECT's. At end of session pt seated in bed with HOB elevated  with all lines and tubes intact, call light within reach. Overall, pt demonstrated decreased independence and safety during completion of ADL/functional transfers/mobility tasks.  Pt would benefit from continued skilled OT to increase safety and independence with completion of ADL/IADL tasks for functional independence and quality of life. Alarm on; sitter present. Pt required cues and education as noted above for safe facilitation and completion of tasks. Therapist provided skilled monitoring of patient's response during treatment session. Prior to and at the end of session, environmental modifications / line management completed for patients safety and efficiency of treatment session. Overall, patient demonstrates moderate difficulties with completion of BADLs and IADLs. Factors contributing to these difficulties include slight bowel incontinence, increased HR ranging from 117 BPM to 145 BPM (nsg aware), decreased endurance, and generalized weakness. As noted above, patient likely to benefit from further OT intervention to increase independence, safety, and overall quality of life. Treatment:     Bed mobility: Facilitated bed mobility with cues for proper body mechanics and sequencing to prepare for ADL completion. ADL completion: Self-care retraining for the above-mentioned ADLs; training on proper hand placement, safety technique, sequencing, and energy conservation techniques. Postural Balance: Sitting balance retraining to improve righting reactions with postural changes during ADLs. Skilled positioning: Proper positioning to improve interaction with environment, overall functioning     Pt has made fair progress towards set goals    OT 1-3 days/wk for 2 weeks PRN     Treatment Time also includes thorough review of current medical information, gathering information on past medical history/social history and prior level of function, informal observation of tasks, assessment of data and education on plan of care and goals.     Treatment Time In: 10:24 AM     Treatment Time Out: 10:47 AM            Treatment Charges: Mins Units   ADL/Home Mgt     [id number] 23 2   Thera Activities     60904       Ther Ex                 [id number]       Manual Therapy    70263     Neuro Re-ed         72076     Orthotic manage/training 23424     Non Billable Time     Total Timed Treatment 97 719 [facility], [address] [place] [address] Ave

## 2022-10-24 NOTE — CONSULTS
GENERAL SURGERY  CONSULT NOTE  10/24/2022    Physician Consulted: Dr. Vane Garcia  Reason for Consult: abdominal pain  Referring Physician: Dr. Briones Renetta is a 76 y.o. female who presented for abdominal pain 10/21. She was found to have proctocolitis and was treated with an aggressive bowel regimen and pain control. Surgery was consulted for abdominal pain. She states her abdominal pain is currently resolved. She underwent an MRCP yesterday which showed some edema surrounding C-loop in the head of the pancreas concerning for duodenitis first group pancreatitis. She has no complaints today but would like to eat. She is unsure if she has previously had an EGD or colonoscopy. Past surgical history include an open cholecystectomy, hysterectomy and incisional hernia repair with mesh. Past Medical History:   Diagnosis Date    Hypertension     Lymphoma ([facility] [address]     remission since 2015       Past Surgical History:   Procedure Laterality Date    APPENDECTOMY      CHOLECYSTECTOMY, OPEN      HYSTERECTOMY (CERVIX STATUS UNKNOWN)      INCISIONAL HERNIA REPAIR         Medications Prior to Admission    Prior to Admission medications    Medication Sig Start Date End Date Taking?  Authorizing Provider   albuterol sulfate HFA (VENTOLIN HFA) 108 (90 Base) MCG/ACT inhaler Inhale 2 puffs into the lungs every 6 hours as needed for Wheezing    Historical Provider, MD   vitamin B-12 (CYANOCOBALAMIN) 1000 MCG tablet Take 1,000 mcg by mouth daily    Historical Provider, MD   metoprolol succinate (TOPROL XL) 50 MG extended release tablet Take 50 mg by mouth nightly    Historical Provider, MD   aspirin 81 MG EC tablet Take 81 mg by mouth daily    Historical Provider, MD   apixaban (ELIQUIS) 5 MG TABS tablet Take 5 mg by mouth daily    Historical Provider, MD   melatonin 5 MG TABS tablet Take 5 mg by mouth nightly    Historical Provider, MD   methylPREDNISolone (MEDROL) 4 MG tablet Take 2 mg by mouth 3 times daily Historical Provider, MD       Allergies   Allergen Reactions    Bactrim [Sulfamethoxazole-Trimethoprim] Shortness Of Breath    Ibuprofen Anaphylaxis    Codeine        History reviewed. No pertinent family history. Social History     Tobacco Use    Smoking status: Every Day     Packs/day: 2.00     Types: Cigarettes    Smokeless tobacco: Never   Substance Use Topics    Alcohol use: No    Drug use: Never         Review of Systems: pertinent ROS listed in HPI, all others negative       PHYSICAL EXAM:    Vitals:    10/24/22 1222   BP: 106/85   Pulse: (!) 123   Resp: 20   Temp: 98.9 °F (37.2 °C)   SpO2: 100%       GENERAL:  NAD. A&Ox3. HEAD:  Normocephalic. Atraumatic. EYES:   No scleral icterus. PERRL. LUNGS:  No increased work of breathing. CARDIOVASCULAR: RR  ABDOMEN:  Soft, non-distended, non-tender. No guarding, rigidity, rebound. Healed right upper quadrant Kocher incision scar  EXTREMITIES:   MAEx4. Atraumatic. No LE edema. SKIN:  Warm and dry        ASSESSMENT/PLAN:  76 y.o. female with resolving proctocolitis and possible duodenitis    Okay for clear liquids from surgical standpoint  Monitor abdominal exam  Antibiotics per ID  No plans for acute surgical intervention at this time. Plan will be discussed with Dr. Meghna Julian. General Surgery Progress Note  [facility]    Patient's Name/Date of Birth: Bre Liner / 1945    Date: 10/24/22    Surgeon: Meghna Julian MD    Chief Complaint: abdominal pain, nausea    Patient Active Problem List   Diagnosis    Generalized abdominal pain    Acute proctocolitis ([address]       Subjective: HPI as above. Nausea improved. Abdominal pain has subsided. Admitted with proctocolitis and fecal impaction which is improving. Would like to eat.      Objective:  /85   Pulse 99   Temp 97.8 °F (36.6 °C) (Oral)   Resp 20   Ht 5' 6\" (1.676 m)   Wt 196 lb (88.9 kg)   SpO2 93%   BMI 31.64 kg/m²   Labs:  Recent Labs     10/22/22  0509 10/23/22  0500 10/24/22  0544   WBC 7.5 9.2 9.7   HGB 11.9 12.3 12.3   HCT 38.9 40.2 39.4     Lab Results   Component Value Date    CREATININE 2.5 (H) 10/24/2022    BUN 90 (H) 10/24/2022     10/24/2022    K 4.3 10/24/2022     10/24/2022    CO2 22 10/24/2022     Recent Labs     10/23/22  2208   LIPASE 17     CBC:   Lab Results   Component Value Date/Time    WBC 9.7 10/24/2022 05:44 AM    RBC 4.58 10/24/2022 05:44 AM    HGB 12.3 10/24/2022 05:44 AM    HCT 39.4 10/24/2022 05:44 AM    MCV 86.0 10/24/2022 05:44 AM    MCH 26.9 10/24/2022 05:44 AM    MCHC 31.2 10/24/2022 05:44 AM    RDW 18.0 10/24/2022 05:44 AM     10/24/2022 05:44 AM    MPV 11.3 10/24/2022 05:44 AM     CMP:    Lab Results   Component Value Date/Time     10/24/2022 11:06 PM    K 4.3 10/24/2022 11:06 PM    K 3.3 07/06/2022 08:30 AM     10/24/2022 11:06 PM    CO2 22 10/24/2022 11:06 PM    BUN 90 10/24/2022 11:06 PM    CREATININE 2.5 10/24/2022 11:06 PM    GFRAA >60 07/06/2022 08:30 AM    LABGLOM 19 10/24/2022 11:06 PM    GLUCOSE 114 10/24/2022 11:06 PM    PROT 5.8 10/24/2022 05:44 AM    LABALBU 3.4 10/24/2022 05:44 AM    CALCIUM 8.4 10/24/2022 11:06 PM    BILITOT 1.2 10/24/2022 05:44 AM    ALKPHOS 92 10/24/2022 05:44 AM    AST 88 10/24/2022 05:44 AM    ALT 65 10/24/2022 05:44 AM       General appearance:  NAD  Head: NCAT, PERRLA, EOMI, red conjunctiva  Neck: supple, no masses  Lungs: CTAB, equal chest rise bilateral  Heart: Reg rate  Abdomen: soft, nondistended, nontender  Skin; no lesions  Gu: no cva tenderness  Extremities: extremities normal, atraumatic, no cyanosis or edema      Assessment/Plan:  José Miguel Oz is a 76 y.o. female with duodenitis, proctocolitis, resolving. Lactic acidosis resolved.     Clear liquid diet  Monitor abdominal exam and bowel function  GI following, for EGD 10/25 to evaluate duodenitis  Lipase noted, no acute pancreatitis  Ok to advance diet after EGD if no other findings by GI  Will follow    Kay Iza Giron MD

## 2022-10-24 NOTE — PROCEDURES
DL Power Midline  Placement 10/24/2022    Product number: [id number]   Lot Number: [id number]      Ultrasound: yes   R Basilic      Upper Arm Circumference: 27CM    Size: 5.5F/15CM    Exposed Length: 0    Internal Length: 15CM   Cut: 0   Vein Measurement: 0.55CM  Brisk blood return times 2, pt olga well, site intact, clamped and labeled     Lolly Grimes RN  10/24/2022  9:49 AM

## 2022-10-24 NOTE — PROGRESS NOTES
Internal Medicine Progress Note    PRESTON=[facility]    Libby Howard. ONEIL Amaya D.O., ONEIL Bustillos D.O. Verna Singh, MSN, APRN, NP-C  La Estrada. Manuel Landrum, MSN, APRN-CNP     Primary Care Physician: Kevin Urbano MD   Admitting Physician:  Sharmaine Whittaker DO  Admission date and time: 10/21/2022  4:18 AM    Room:  [address] [place] [address]  Admitting diagnosis: Proctocolitis [K52.9]  Diverticulosis [K57.90]  Acute kidney injury [facility]) [N17.9]  Idiopathic proctocolitis, other complication [facility]) [W90.787]    Patient Name: Adam Felix  MRN: [id number]    Date of Service: 10/24/2022     Subjective:  Herman Lucio is a 76 y.o. female who was seen and examined today,10/24/2022, at the bedside. Herman Lucio is more comfortable on examination today. In fact, she describes significant improvement in her abdominal pain and is requesting advancement in her diet. Multiple issues occurred over the past 24 hours including worsening renal function, some degree of lactic acidosis, and hypotension. This seems to be resolving but persist to a point where I would feel more comfortable at a higher level of care. She will be transferred to the [facility] floor. No family members were present during my examination. Review of System:   Constitutional:   Far less discomfort today. HEENT:   Denies ear pain, sore throat, sinus or eye problems. Maintained on nasal cannula oxygen. Cardiovascular:   Admits to palpitations but denies overt chest pain. Respiratory:   Less shortness of breath today. Gastrointestinal:   Less abdominal pain today. In fact, she is requesting advancement in her diet. She had several bowel movements yesterday and overnight. Genitourinary:    Denies any urgency, frequency, hematuria. Voiding with use of a Vargas catheter. Decreased urinary output overall. Extremities:   Denies lower extremity swelling, edema or cyanosis.    Neurology:    Denies any headache or focal neurological deficits, admits to generalized weakness and deconditioning. Psch:   Denies being anxious or depressed. I question early dementia   Musculoskeletal:    Denies  myalgias, joint complaints or back pain. Integumentary:   Denies any rashes, ulcers, or excoriations. Denies bruising. Hematologic/Lymphatic:  Denies bruising or bleeding. Physical Exam:  No intake/output data recorded. Intake/Output Summary (Last 24 hours) at 10/24/2022 0929  Last data filed at 10/24/2022 0501  Gross per 24 hour   Intake 2338.33 ml   Output 600 ml   Net 1738.33 ml   I/O last 3 completed shifts: In: 3133.3 [P.O.:420; I.V.:2413.3; IV Piggyback:300]  Out: 725 [Urine:500; Emesis/NG output:225]  Patient Vitals for the past 96 hrs (Last 3 readings):   Weight   10/24/22 0458 196 lb (88.9 kg)   10/21/22 0421 180 lb (81.6 kg)     Vital Signs:   Blood pressure 100/89, pulse (!) 119, temperature 97.6 °F (36.4 °C), temperature source Oral, resp. rate 17, height 5' 6\" (1.676 m), weight 196 lb (88.9 kg), SpO2 99 %. General appearance:  Awake and alert. Far more comfortable appearing today  Head:  Normocephalic. No masses, lesions or tenderness. Eyes:  PERRLA. EOMI. Sclera clear. Buccal mucosa moist.  ENT:  Ears normal. Mucosa normal.  Neck:    Supple. Trachea midline. No thyromegaly. No JVD. No bruits. Heart:    Rhythm regular. Rate controlled. No murmurs. Lungs:    Symmetrical. Clear to auscultation bilaterally. No wheezes. No rhonchi. No rales. Abdomen:   Soft. Less tenderness to palpation diffusely. Bowel sounds are active. Voluntary guarding is elicited but to a much lesser degree. No peritoneal signs. No rebound tenderness. Extremities:    Peripheral pulses present. No peripheral edema. No ulcers. No cyanosis. No clubbing. Neurologic:    Alert x 3. No focal deficit. Cranial nerves grossly intact. No focal weakness. Psych:   I question early dementia.   She is certainly more redirectable but exhibited some signs of sundowning overnight  Musculoskeletal:   Spine ROM normal. Muscular strength intact. Gait not assessed. Integumentary:  No rashes  Skin normal color and texture.   Genitalia/Breast:  Deferred    Medication:  Scheduled Meds:   piperacillin-tazobactam  3,375 mg IntraVENous Q12H    metoprolol  2.5 mg IntraVENous Q8H    vancomycin  20 mg/kg (Adjusted) IntraVENous Once    lidocaine  5 mL IntraDERmal Once    sodium chloride flush  5-40 mL IntraVENous 2 times per day    heparin flush  1 mL IntraVENous 2 times per day    vancomycin (VANCOCIN) intermittent dosing (placeholder)   Other RX Placeholder    enoxaparin  1 mg/kg SubCUTAneous Daily    [Held by provider] apixaban  5 mg Oral Daily    [Held by provider] aspirin  81 mg Oral Daily    vitamin B-12  1,000 mcg Oral Daily    budesonide  0.5 mg Nebulization BID    Arformoterol Tartrate  15 mcg Nebulization BID    sodium chloride flush  5-40 mL IntraVENous 2 times per day    sennosides-docusate sodium  2 tablet Oral BID    polyethylene glycol  17 g Oral BID    lactulose  20 g Oral BID    ipratropium-albuterol  1 ampule Inhalation Q4H WA    nicotine  1 patch TransDERmal Daily     Continuous Infusions:   pantoprozole (PROTONIX) infusion 8 mg/hr (10/24/22 0916)    sodium chloride      IV infusion builder 100 mL/hr at 10/24/22 0458    sodium chloride         Objective Data:  CBC with Differential:    Lab Results   Component Value Date/Time    WBC 9.7 10/24/2022 05:44 AM    RBC 4.58 10/24/2022 05:44 AM    HGB 12.3 10/24/2022 05:44 AM    HCT 39.4 10/24/2022 05:44 AM     10/24/2022 05:44 AM    MCV 86.0 10/24/2022 05:44 AM    MCH 26.9 10/24/2022 05:44 AM    MCHC 31.2 10/24/2022 05:44 AM    RDW 18.0 10/24/2022 05:44 AM    LYMPHOPCT 15.8 10/24/2022 05:44 AM    MONOPCT 10.2 10/24/2022 05:44 AM    BASOPCT 0.2 10/24/2022 05:44 AM    MONOSABS 0.98 10/24/2022 05:44 AM    LYMPHSABS 1.52 10/24/2022 05:44 AM    EOSABS 0.01 10/24/2022 05:44 AM BASOSABS 0.02 10/24/2022 05:44 AM     CMP:    Lab Results   Component Value Date/Time     10/24/2022 05:44 AM    K 4.7 10/24/2022 05:44 AM    K 3.3 07/06/2022 08:30 AM     10/24/2022 05:44 AM    CO2 21 10/24/2022 05:44 AM    BUN 91 10/24/2022 05:44 AM    CREATININE 2.5 10/24/2022 05:44 AM    GFRAA >60 07/06/2022 08:30 AM    LABGLOM 19 10/24/2022 05:44 AM    GLUCOSE 133 10/24/2022 05:44 AM    PROT 5.8 10/24/2022 05:44 AM    LABALBU 3.4 10/24/2022 05:44 AM    CALCIUM 8.8 10/24/2022 05:44 AM    BILITOT 1.2 10/24/2022 05:44 AM    ALKPHOS 92 10/24/2022 05:44 AM    AST 88 10/24/2022 05:44 AM    ALT 65 10/24/2022 05:44 AM       Assessment:  Acute proctocolitis in the setting of severe constipation   Ongoing abdominal pain with radiographic evidence of duodenitis  Bacteremia with Staphylococcus x1 bottle  Acute renal failure that is multifactorial in nature  Atrial fibrillation with intermittent periods of rapid ventricular response  History of non-Hodgkin's lymphoma currently in remission  COPD with ongoing tobacco abuse  Early dementia    Plan:   Imaging studies yesterday revealed diffuse intra-abdominal inflammation with consideration for duodenitis. Protonix infusion has been instituted. We have asked both the GI and general surgery teams to provide consultation. Lactic acid has trended downward with appropriate IV fluid resuscitation. Renal function has worsened with decreased urinary output. This is in the setting of contrast nephropathy along with hypoperfusion in the setting of hypotension. Nephrology will provide consultation and we will continue with fluid resuscitation including bicarbonate supplementation. 1 blood culture has returned positive and antibiotic therapy will be broadened. The infectious disease team will provide consultation. 2D echocardiogram is pending. Heart rate and rhythm are being monitored closely.   Oral anticoagulation therapy has been placed on hold in the event procedural/surgical intervention is warranted and we are utilizing therapeutic Lovenox. Secondary to the multiple issues at play, the patient will be transferred to a higher level of care. More than 50% of my  time was spent at the bedside counseling/coordinating care with the patient and/or family with face to face contact. This time was spent reviewing notes and laboratory data as well as instructing and counseling the patient. Time I spent with the family or surrogate(s) is included only if the patient was incapable of providing the necessary information or participating in medical decisions. I also discussed the differential diagnosis and all of the proposed management plans with the patient and individuals accompanying the patient. Hina Cuevas requires this high level of physician care and nursing on the IMC/Telemetry unit due the complexity of decision management and chance of rapid decline or death. Continued cardiac monitoring and higher level of nursing are required. I am readily available for any further decision-making and intervention.      Tracey Valdez DO, VAUGHN.C.O.I.  10/24/2022  9:29 AM

## 2022-10-24 NOTE — CONSULTS
Nephrology Consult  [facility]  Ailyn Nicholson. Michelet SOLANO    CC:   Acute Kidney injury    HPI:   Manjit Christopher is a 76year old female who came to the hospital 10/21 for evaluation of lower abdominal pain and constipation. She was admitted for pain management and mild proctocolitis. She has a PMH of non-Hodgkin's lymphoma in remission since 2015. During her work up she had a CT with IV contrast on 10/21. At that time her creatinine was 1.5 mg/dl and has since risen to 2.5 mg/dl today and renal consult was requested. She has also been dosed with Vancomycin, awaiting Vanc level Zero eosinophils in urine  It appears she was seen in Jan 2022 at [facility] for MAGGI. Baseline creatinine from 7/2022 is 1.0-1.1 mg/dl. She is confused and is unable to provide any meaningful history.      PMH:    Past Medical History:   Diagnosis Date    Hypertension     Lymphoma ([facility] [address]     remission since 2015       Patient Active Problem List   Diagnosis    Generalized abdominal pain    Acute proctocolitis ([facility] [address]       Meds:     piperacillin-tazobactam  3,375 mg IntraVENous Q12H    metoprolol  2.5 mg IntraVENous Q8H    vancomycin  20 mg/kg (Adjusted) IntraVENous Once    sodium chloride flush  5-40 mL IntraVENous 2 times per day    heparin flush  1 mL IntraVENous 2 times per day    vancomycin (VANCOCIN) intermittent dosing (placeholder)   Other RX Placeholder    enoxaparin  1 mg/kg SubCUTAneous Daily    [Held by provider] apixaban  5 mg Oral Daily    [Held by provider] aspirin  81 mg Oral Daily    vitamin B-12  1,000 mcg Oral Daily    budesonide  0.5 mg Nebulization BID    Arformoterol Tartrate  15 mcg Nebulization BID    sodium chloride flush  5-40 mL IntraVENous 2 times per day    sennosides-docusate sodium  2 tablet Oral BID    polyethylene glycol  17 g Oral BID    lactulose  20 g Oral BID    ipratropium-albuterol  1 ampule Inhalation Q4H WA    nicotine  1 patch TransDERmal Daily        pantoprozole (PROTONIX) infusion 8 mg/hr (10/24/22 0916) sodium chloride      IV infusion builder 100 mL/hr at 10/24/22 0458    sodium chloride         Meds prn:     metoprolol, sodium chloride flush, sodium chloride, heparin flush, fentanNYL, albuterol, magnesium sulfate, sodium phosphate IVPB **OR** sodium phosphate IVPB, potassium chloride **OR** potassium alternative oral replacement **OR** potassium chloride, sodium chloride flush, sodium chloride, ondansetron **OR** ondansetron, acetaminophen **OR** acetaminophen, bisacodyl    Meds prior to admission:     No current facility-administered medications on file prior to encounter. Current Outpatient Medications on File Prior to Encounter   Medication Sig Dispense Refill    albuterol sulfate HFA (VENTOLIN HFA) 108 (90 Base) MCG/ACT inhaler Inhale 2 puffs into the lungs every 6 hours as needed for Wheezing      vitamin B-12 (CYANOCOBALAMIN) 1000 MCG tablet Take 1,000 mcg by mouth daily      metoprolol succinate (TOPROL XL) 50 MG extended release tablet Take 50 mg by mouth nightly      aspirin 81 MG EC tablet Take 81 mg by mouth daily      apixaban (ELIQUIS) 5 MG TABS tablet Take 5 mg by mouth daily      melatonin 5 MG TABS tablet Take 5 mg by mouth nightly      methylPREDNISolone (MEDROL) 4 MG tablet Take 2 mg by mouth 3 times daily         Allergies:    Bactrim [sulfamethoxazole-trimethoprim], Ibuprofen, and Codeine    Social History:     reports that she has been smoking. She has been smoking an average of 2 packs per day. She has never used smokeless tobacco. She reports that she does not drink alcohol and does not use drugs. Family History:     History reviewed. No pertinent family history.     ROS:     All pertinent + discussed in HPI  All other sx negative     Physical Exam:      Patient Vitals for the past 24 hrs:   BP Temp Temp src Pulse Resp SpO2 Weight   10/24/22 0900 100/89 97.6 °F (36.4 °C) Oral (!) 119 17 99 % --   10/24/22 0735 93/81 97.7 °F (36.5 °C) Oral (!) 108 18 97 % --   10/24/22 0645 -- -- -- -- -- 99 % --   10/24/22 0607 101/75 97.7 °F (36.5 °C) Oral (!) 143 19 98 % --   10/24/22 0458 -- -- -- -- -- -- 196 lb (88.9 kg)   10/24/22 0415 107/62 97.7 °F (36.5 °C) Oral (!) 110 20 100 % --   10/24/22 0158 (!) 107/92 97.7 °F (36.5 °C) Oral (!) 126 19 100 % --   10/24/22 0145 -- -- -- -- -- (!) 82 % --   10/24/22 0007 (!) 101/59 97.7 °F (36.5 °C) Oral (!) 120 20 92 % --   10/23/22 2115 101/66 97.7 °F (36.5 °C) Oral (!) 120 24 96 % --   10/23/22 1715 106/86 97.8 °F (36.6 °C) Axillary (!) 117 18 96 % --   10/23/22 1515 -- -- -- -- -- 99 % --   10/23/22 1153 99/64 97.4 °F (36.3 °C) Oral (!) 121 20 100 % --   10/23/22 1140 -- -- -- -- 20 -- --         Intake/Output Summary (Last 24 hours) at 10/24/2022 1041  Last data filed at 10/24/2022 0501  Gross per 24 hour   Intake 2338.33 ml   Output 600 ml   Net 1738.33 ml       Constitutional: Patient appears stated age  Head: normocephalic, atraumatic   Neck: supple, no jvd  Cardiovascular: tachycardic- irregular  Respiratory: Clear upper, diminished in bases   Gastrointestinal: soft, nontender, distended  Ext: no edema   Neuro: confused  Skin: dry, no rash       Data:    Recent Labs     10/22/22  0509 10/23/22  0500 10/24/22  0544   WBC 7.5 9.2 9.7   HGB 11.9 12.3 12.3   HCT 38.9 40.2 39.4   MCV 86.3 85.9 86.0    339 310       Recent Labs     10/22/22  0509 10/23/22  0500 10/23/22  1608 10/23/22  2208 10/24/22  0544    137 141 143 142   K 3.3* 4.8 4.8 4.6 4.7   CL 99 100 102 103 104   CO2 20* 17* 19* 20* 21*   CREATININE 1.6* 2.0* 2.2* 2.4* 2.5*   BUN 65* 74* 84* 87* 91*   LABGLOM 33 25 22 20 19   GLUCOSE 139* 144* 133* 120* 133*   CALCIUM 8.7 9.0 8.8 8.9 8.8   PHOS 5.2* 4.9*  --   --  5.3*   MG 2.5 2.6  --   --  2.6       No results found for: VITD25    No results found for: PTH    Recent Labs     10/22/22  0509 10/23/22  0500 10/24/22  0544   ALT 25 41* 65*   AST 33* 53* 88*   ALKPHOS 95 99 92   BILITOT 1.2 1.0 1.2   BILIDIR <0.2 0.5* 0.5*       Recent Labs 10/22/22  0509 10/23/22  0500 10/24/22  0544   LABALBU 3.5 3.6 3.4*       No results found for: FERRITIN, IRON, TIBC    No results found for: BWNISOHW04    No results found for: FOLATE      Lab Results   Component Value Date/Time    COLORU Yellow 12/13/2017 04:10 PM    NITRU Negative 12/13/2017 04:10 PM    GLUCOSEU Negative 12/13/2017 04:10 PM    KETUA Negative 12/13/2017 04:10 PM    UROBILINOGEN 0.2 12/13/2017 04:10 PM    BILIRUBINUR Negative 12/13/2017 04:10 PM       Lab Results   Component Value Date/Time    OSMOU 569 10/21/2022 06:31 AM       No components found for: URIC    No results found for: [facility] [[id number]] Collected: 10/23/22 1112      Order Status: Completed Updated: 10/23/22 1116     Narrative:       EXAMINATION:   RETROPERITONEAL ULTRASOUND OF THE KIDNEYS AND URINARY BLADDER     10/23/2022     COMPARISON:   None     HISTORY:   ORDERING SYSTEM PROVIDED HISTORY: ARF   TECHNOLOGIST PROVIDED HISTORY:     Reason for exam:->ARF   What reading provider will be dictating this exam?->CRC     FINDINGS:     Kidneys: The right kidney measures 9.7 cm in length and the left kidney measures 8.9   cm in length. Cortex within normal limits measuring 7 mm on the right and 9   mm on the left. Kidneys demonstrate normal cortical echogenicity. No evidence of   hydronephrosis or intrarenal stones. However, there is cyst lower pole left   kidney measuring 1.6 x 1.4 x 1.1 cm. Impression:       Simple appearing cyst lower pole left kidney. Otherwise, negative ultrasound   of the kidneys. Assessment and Plan:    Acute kidney injury-  Likely in the setting of ineffective renal perfusion, compounded by contrast and antibiotics.  BP's trending on the lower side in the  range she is also a-fib RVR  Baseline creatinine 1.0-1.1 mg/dl  FeNa <1%  Renal US- no hydro, simple cyst.   Creatinine 1.6-->2.5mg/dl  She has been started on IVF  UOP as recorded 500 ml past 24 hours. Continue IVF  Follow labs    2. HAG Metabolic acidosis with elevated lactic acid-  Started on IV HCO3  CO2 20->17->20->21  Follow     3. Hypotension-  In the setting of a-fib RVR  She has been transferred to a monitored bed  On IV lopressor for rate control    4.  Hyperphosphatemia-  In the setting of MAGGI  Will follow   Add binders if needed      MASSIMO Kaur - CNP  Pt seen and examined agree with above  Crat 2.5  Dye induced atn  Follow cr and uo  Continue ivf  Golden Sanchez MD

## 2022-10-24 NOTE — CONSULTS
Inpatient CARDIOLOGY Consultation        Reason for Consult:  Preop clearance     Date of Consultation: 10/24/2022    Patient previously not known to [facility] cardiology. Follows with Dr Margarito Hassan:  76year old with history of PAF, HTN, NHL presented to [facility]'s emergency department for the evaluation of intractable lower quadrant abdominal pain with constipation over 5 days. While in the emergency department she did have a small bowel movement but continued to describe abdominal discomfort. Work-up revealed mild proctocolitis and it was determined she would be admitted to the hospital for more aggressive bowel regimen and pain control. Seen by GI and surgery. Cardiology consulted for Clearance for surgical procedure and her A fib    John CP or SOB, No dizzy. Abdominal pain is better now. \"I am hungry\"    No orthopnea. States compliant with her medications - No family at bed side    REVIEW OF SYSTEMS:   Review of rest of 12 systems negative except as mentioned in HPI. Please note: past medical records were reviewed per electronic medical record (EMR) - see detailed reports under Past Medical/ Surgical History. Past Medical History:    Past Medical History:   Diagnosis Date    Hypertension     Lymphoma ([address]     remission since 2015       Past Surgical History:    Past Surgical History:   Procedure Laterality Date    APPENDECTOMY      CHOLECYSTECTOMY, OPEN      HYSTERECTOMY (CERVIX STATUS UNKNOWN)      INCISIONAL HERNIA REPAIR           Allergies:    Bactrim [sulfamethoxazole-trimethoprim], Ibuprofen, and Codeine    Social History:    Social History     Socioeconomic History    Marital status:       Spouse name: Not on file    Number of children: Not on file    Years of education: Not on file    Highest education level: Not on file   Occupational History    Not on file   Tobacco Use    Smoking status: Every Day     Packs/day: 2.00     Types: Cigarettes    Smokeless tobacco: Never   Substance and Sexual Activity    Alcohol use: No    Drug use: Never    Sexual activity: Not on file   Other Topics Concern    Not on file   Social History Narrative    Not on file     Social Determinants of Health     Financial Resource Strain: Not on file   Food Insecurity: Not on file   Transportation Needs: Not on file   Physical Activity: Not on file   Stress: Not on file   Social Connections: Not on file   Intimate Partner Violence: Not on file   Housing Stability: Not on file       Family History:   History reviewed. No pertinent family history. Medications Prior to admit: Reviewed  Prior to Admission medications    Medication Sig Start Date End Date Taking?  Authorizing Provider   albuterol sulfate HFA (VENTOLIN HFA) 108 (90 Base) MCG/ACT inhaler Inhale 2 puffs into the lungs every 6 hours as needed for Wheezing    Historical Provider, MD   vitamin B-12 (CYANOCOBALAMIN) 1000 MCG tablet Take 1,000 mcg by mouth daily    Historical Provider, MD   metoprolol succinate (TOPROL XL) 50 MG extended release tablet Take 50 mg by mouth nightly    Historical Provider, MD   aspirin 81 MG EC tablet Take 81 mg by mouth daily    Historical Provider, MD   apixaban (ELIQUIS) 5 MG TABS tablet Take 5 mg by mouth daily    Historical Provider, MD   melatonin 5 MG TABS tablet Take 5 mg by mouth nightly    Historical Provider, MD   methylPREDNISolone (MEDROL) 4 MG tablet Take 2 mg by mouth 3 times daily    Historical Provider, MD         DATA:      ECG - Reviewed     Tele strips: Reviewed    Diagnostic:      Intake/Output Summary (Last 24 hours) at 10/24/2022 1516  Last data filed at 10/24/2022 1118  Gross per 24 hour   Intake 2338.33 ml   Output 500 ml   Net 1838.33 ml       IMAGING STUDIES: Reviewed        LABS:      Cardiac enzymes:  Recent Labs     10/23/22  1013 10/23/22  1124 10/24/22  [address] --   --  337*   TROPHS 37* 38*  --      CBC:   Recent Labs     10/23/22  0500 10/24/22  0544   WBC 9.2 9.7   HGB 12.3 12.3   HCT 40.2 39.4    310     BMP:   Recent Labs     10/24/22  0544 10/24/22  1422    139   K 4.7 4.4   CO2 21* 23   BUN 91* 90*   CREATININE 2.5* 2.4*   LABGLOM 19 20   CALCIUM 8.8 8.9     Mag:   Recent Labs     10/23/22  0500 10/24/22  0544   MG 2.6 2.6     Phos:   Recent Labs     10/23/22  0500 10/24/22  0544   PHOS 4.9* 5.3*     TSH:   Recent Labs     10/22/22  0509   TSH 0.950     HgA1c:   Lab Results   Component Value Date    LABA1C 6.8 (H) 10/22/2022     No results found for: EAG  proBNP: No results for input(s): PROBNP in the last 72 hours. PT/INR: No results for input(s): PROTIME, INR in the last 72 hours. APTT:No results for input(s): APTT in the last 72 hours.   FASTING LIPID PANEL:  Lab Results   Component Value Date/Time    CHOL 140 10/22/2022 05:09 AM    HDL 21 10/22/2022 05:09 AM    LDLCALC 102 10/22/2022 05:09 AM    TRIG 83 10/22/2022 05:09 AM     LIVER PROFILE:  Recent Labs     10/23/22  0500 10/24/22  0544   AST 53* 88*   ALT 41* 65*   LABALBU 3.6 3.4*       Current Inpatient Medications:   piperacillin-tazobactam  3,375 mg IntraVENous Q12H    metoprolol  2.5 mg IntraVENous Q8H    sodium chloride flush  5-40 mL IntraVENous 2 times per day    heparin flush  1 mL IntraVENous 2 times per day    vancomycin (VANCOCIN) intermittent dosing (placeholder)   Other RX Placeholder    enoxaparin  1 mg/kg SubCUTAneous Daily    [Held by provider] apixaban  5 mg Oral Daily    [Held by provider] aspirin  81 mg Oral Daily    vitamin B-12  1,000 mcg Oral Daily    budesonide  0.5 mg Nebulization BID    Arformoterol Tartrate  15 mcg Nebulization BID    sodium chloride flush  5-40 mL IntraVENous 2 times per day    sennosides-docusate sodium  2 tablet Oral BID    polyethylene glycol  17 g Oral BID    lactulose  20 g Oral BID    ipratropium-albuterol  1 ampule Inhalation Q4H WA    nicotine 1 patch TransDERmal Daily       IV Infusions (if any):   pantoprozole (PROTONIX) infusion 8 mg/hr (10/24/22 7065)    sodium chloride      IV infusion builder 100 mL/hr at 10/24/22 4840    sodium chloride         PHYSICAL EXAM:     /79   Pulse (!) 130   Temp 98.9 °F (37.2 °C) (Axillary)   Resp 20   Ht 5' 6\" (1.676 m)   Wt 196 lb (88.9 kg)   SpO2 100%   BMI 31.64 kg/m²     CONST:  Well developed, appears stated age. Awake, alert and no apparent distress. HEENT:   Head- Normocephalic  Eyes- Conjunctivae pink, no icterus  Throat- Oral mucosa moist  Neck-  No stridor, no jugular venous distention. No carotid bruit. CHEST: Chest symmetrical and non-tender to palpation. No accessory muscle use  RESPIRATORY: Lung sounds - Few rhonchi  CARDIOVASCULAR:     Heart Inspection-OK  Heart Palpation- No thrills. Heart Ausculation- Irregularly irregular rate and rhythm, 2/6 systolic murmur. No s3 or rub   EXT: No lower extremity edema. Distal pulses palpable, no cyanosis   ABDOMEN: Obese, Bowel sounds present. No abdominal bruit  MS:n/a  : Deferred  RECTAL: Deferred  SKIN: Warm and dry   NEURO / PSYCH: Oriented to person, place; Good mood and affect. IMPRESSION / RECOMMENDATIONS:      Preop cardiac clearance - No CP, No acute CHF - Once HR controlled, she will be cleared for her surgical procedures    Paroxysmal A fib with RVR  - iv Metoprolol and give one dose of iv Digoxin for rate control - Eliquis held, On Lovenox - resume Eliquis after her procedures. Acute proctocolitis (HCC) -Surgery/GI on case    Borderline Elevated troponin - flat pattern, Likely from CKD, Tachycardia - no CP    HTN  - Monitor BP    Hx of NHL            Rest of management per Dr Hardeep Jules    Above recommendations discussed with her. She will f/u with Dr Pedro Wei after discharge.     Electronically signed by Rahul Bacon MD on 10/24/2022 at 3:16 PM  [facility]) Cardiology

## 2022-10-24 NOTE — CONSULTS
Jackie Farah M.D. [facility]  Dr. Korin Mcghee M.D.,  Dr. Aiden Garvin M.D.,  PRADEEP OwenO.,  Dr. Ender Lindo D.O. ,  Dr. Ro Chavis M.D.,          Inetta Dural  76 y.o.  female      Re: For duodenitis versus groove  Requesting physician: Dr. Desmond Chappell  Date:1:02 PM 10/24/2022          HPI: 75-year-old female patient seen in the hospital for above-described issue. Patient is somewhat poor historian but he does not seem that she has noticed any blood in the stool or black stool at home. Apparently she has been constipated for at least 5 days prior to presentation. According to the notes patient had a bowel movement in the emergency department. Patient does not recall upper endoscopy or colonoscopy to the best of her knowledge. Review of available medical records do not indicate endoscopies. Upon presentation CT scan of the abdomen and pelvis was obtained reporting to show area of hypoattenuation at the beto hepatis with new inflammatory changes along the inferior margin of the liver. Also reported as thickened walls of the rectum. MRI/MRCP obtained without contrast shows edema surrounding the duodenal C-loop and head of the pancreas. Laboratory work shows no significant anemia with hemoglobin of 12.3 hematocrit of 39.4. White blood cell count is 9.4 and platelet count is 300. Chemistry panel reveals renal failure with BUN of 91 and creatinine of 2.5. Liver profile shows nonelevated alkaline phosphatase but elevated transaminases and yet nonelevated bilirubin.     Information sources:   -Patient  -medical record  -health care team    PMHx:  Past Medical History:   Diagnosis Date    Hypertension     Lymphoma ([facility] [address]     remission since 2015       PSHx:  Past Surgical History:   Procedure Laterality Date    APPENDECTOMY      CHOLECYSTECTOMY, OPEN      HYSTERECTOMY (CERVIX STATUS UNKNOWN)      INCISIONAL HERNIA REPAIR         Meds:  Current Facility-Administered Medications   Medication Dose Route Frequency Provider Last Rate Last Admin    metoprolol (LOPRESSOR) injection 5 mg  5 mg IntraVENous Q5 Min PRN Benoit Wolff, DO        piperacillin-tazobactam (ZOSYN) 3,375 mg in sodium chloride 0.9 % 50 mL IVPB ([id number])  3,375 mg IntraVENous Q12H Renetta Warnera, DO 12.5 mL/hr at 10/24/22 0918 3,375 mg at 10/24/22 0918    metoprolol (LOPRESSOR) injection 2.5 mg  2.5 mg IntraVENous Q8H Renetta Warnera, DO        pantoprazole (PROTONIX) 80 mg in sodium chloride 0.9 % 100 mL infusion  8 mg/hr IntraVENous Continuous Renetta Sara, DO 10 mL/hr at 10/24/22 0916 8 mg/hr at 10/24/22 0916    sodium chloride flush 0.9 % injection 5-40 mL  5-40 mL IntraVENous 2 times per day Renetta Warnera, DO        sodium chloride flush 0.9 % injection 5-40 mL  5-40 mL IntraVENous PRN Renetta Warnera, DO        0.9 % sodium chloride infusion   IntraVENous PRN Renetta Warnera, DO        heparin flush 100 UNIT/ML injection 100 Units  1 mL IntraVENous 2 times per day Renetta Warnera, DO        heparin flush 100 UNIT/ML injection 100 Units  1 mL IntraCATHeter PRN Renetta Warnera, DO        vancomycin (VANCOCIN) intermittent dosing (placeholder)   Other RX Placeholder Renetta Warnera, DO        enoxaparin (LOVENOX) injection 80 mg  1 mg/kg SubCUTAneous Daily MASSIMO Huber CNP   80 mg at 10/24/22 0919    potassium chloride 20 mEq, sodium bicarbonate 50 mEq in sodium chloride 0.45 % 1,000 mL infusion   IntraVENous Continuous Renetta Warnera,  mL/hr at 10/24/22 0458 New Bag at 10/24/22 0458    fentaNYL (SUBLIMAZE) injection 25 mcg  25 mcg IntraVENous Q4H PRN Renetta Warnera, DO   25 mcg at 10/23/22 1140    [Held by provider] apixaban (ELIQUIS) tablet 5 mg  5 mg Oral Daily Renetta Warnera, DO        [Held by provider] aspirin EC tablet 81 mg  81 mg Oral Daily Renetta Warnera, DO   81 mg at 10/22/22 8694    vitamin B-12 (CYANOCOBALAMIN) tablet 1,000 mcg  1,000 mcg Oral Daily Renetta Ruiz DO   1,000 mcg at 10/22/22 0825    albuterol (PROVENTIL) nebulizer solution 2.5 mg  2.5 mg Nebulization Q4H PRN Nilson Heritage, DO        budesonide (PULMICORT) nebulizer suspension 500 mcg  0.5 mg Nebulization BID Nilson Heritage, DO   500 mcg at 10/24/22 0645    magnesium sulfate 1000 mg in dextrose 5% 100 mL IVPB  1,000 mg IntraVENous PRN Nilson Heritage, DO        sodium phosphate 13.05 mmol in sodium chloride 0.9 % 250 mL IVPB  0.16 mmol/kg IntraVENous PRN Nilson Heritage, DO        Or    sodium phosphate 26.1 mmol in sodium chloride 0.9 % 500 mL IVPB  0.32 mmol/kg IntraVENous PRN Nilson Heritage, DO        potassium chloride (KLOR-CON M) extended release tablet 40 mEq  40 mEq Oral PRN Nilson Heritage, DO        Or    potassium bicarb-citric acid (EFFER-K) effervescent tablet 40 mEq  40 mEq Oral PRN Nilson Heritage, DO   40 mEq at 10/22/22 8736    Or    potassium chloride 10 mEq/100 mL IVPB (Peripheral Line)  10 mEq IntraVENous PRN Nilson Heritage, DO        Arformoterol Tartrate (BROVANA) nebulizer solution 15 mcg  15 mcg Nebulization BID Nilson Heritage, DO   15 mcg at 10/24/22 0645    sodium chloride flush 0.9 % injection 5-40 mL  5-40 mL IntraVENous 2 times per day Nilson Heritage, DO   10 mL at 10/23/22 2140    sodium chloride flush 0.9 % injection 5-40 mL  5-40 mL IntraVENous PRN Nilson Heritage, DO        0.9 % sodium chloride infusion   IntraVENous PRN Nilson Heritage, DO        ondansetron (ZOFRAN-ODT) disintegrating tablet 4 mg  4 mg Oral Q8H PRN Nilson Heritage, DO        Or    ondansetron [facility]) injection 4 mg  4 mg IntraVENous Q6H PRN Nilson Heritage, DO   4 mg at 10/23/22 2141    acetaminophen (TYLENOL) tablet 650 mg  650 mg Oral Q6H PRN Nilson Heritage, DO        Or    acetaminophen (TYLENOL) suppository 650 mg  650 mg Rectal Q6H PRN Nilson Heritage, DO        sennosides-docusate sodium (SENOKOT-S) 8.6-50 MG tablet 2 tablet  2 tablet Oral BID Nilson Pena DO   2 tablet at 10/23/22 2141    polyethylene glycol (GLYCOLAX) packet 17 g  17 g Oral BID Nilson Pena DO   17 g at 10/23/22 2140    bisacodyl (DULCOLAX) EC tablet 5 mg  5 mg Oral Daily PRN Ami Kg, DO        lactulose (CHRONULAC) 10 GM/15ML solution 20 g  20 g Oral BID Ami Kg, DO   20 g at 10/23/22 2141    ipratropium-albuterol (DUONEB) nebulizer solution 1 ampule  1 ampule Inhalation Q4H WA Ami Kg, DO   1 ampule at 10/24/22 1011    nicotine (NICODERM CQ) 21 MG/24HR 1 patch  1 patch TransDERmal Daily Brice Ferrer DO   1 patch at 10/24/22 0920        SocHx:  Social History     Socioeconomic History    Marital status:      Spouse name: Not on file    Number of children: Not on file    Years of education: Not on file    Highest education level: Not on file   Occupational History    Not on file   Tobacco Use    Smoking status: Every Day     Packs/day: 2.00     Types: Cigarettes    Smokeless tobacco: Never   Substance and Sexual Activity    Alcohol use: No    Drug use: Never    Sexual activity: Not on file   Other Topics Concern    Not on file   Social History Narrative    Not on file     Social Determinants of Health     Financial Resource Strain: Not on file   Food Insecurity: Not on file   Transportation Needs: Not on file   Physical Activity: Not on file   Stress: Not on file   Social Connections: Not on file   Intimate Partner Violence: Not on file   Housing Stability: Not on file       FamHx:History reviewed. No pertinent family history. Allergy:  Allergies   Allergen Reactions    Bactrim [Sulfamethoxazole-Trimethoprim] Shortness Of Breath    Ibuprofen Anaphylaxis    Codeine          ROS: As described in the HPI and in addition is negative upon detailed review of systems or unobtainable unless otherwise stated in this dictation.     PE:  /85   Pulse (!) 123   Temp 98.9 °F (37.2 °C) (Axillary)   Resp 20   Ht 5' 6\" (1.676 m)   Wt 196 lb (88.9 kg)   SpO2 100%   BMI 31.64 kg/m²     Gen.: NAD/elderly  female  Head: Atraumatic/normocephalic  Eyes: EOMI/anicteric sclera  ENT: Moist oral mucosa/no discharge from nose ears  Neck: Supple/trachea midline  Chest: CTA B/symmetric excursions  Cor: Regular rate and rhythm and rate  Abd.: Soft and obese. Nontender  Extr.:  BLE trace  edema  Muscles: Decreased tone and bulk, consistent with age and condition  Skin: Warm and dry. Anicteric    DATA:  Stool (measured) : 0 mL  Lab Results   Component Value Date/Time    WBC 9.7 10/24/2022 05:44 AM    RBC 4.58 10/24/2022 05:44 AM    HGB 12.3 10/24/2022 05:44 AM    HCT 39.4 10/24/2022 05:44 AM    MCV 86.0 10/24/2022 05:44 AM    MCH 26.9 10/24/2022 05:44 AM    MCHC 31.2 10/24/2022 05:44 AM    RDW 18.0 10/24/2022 05:44 AM     10/24/2022 05:44 AM    MPV 11.3 10/24/2022 05:44 AM     Lab Results   Component Value Date/Time     10/24/2022 05:44 AM    K 4.7 10/24/2022 05:44 AM    K 3.3 07/06/2022 08:30 AM     10/24/2022 05:44 AM    CO2 21 10/24/2022 05:44 AM    BUN 91 10/24/2022 05:44 AM    CREATININE 2.5 10/24/2022 05:44 AM    CALCIUM 8.8 10/24/2022 05:44 AM    PROT 5.8 10/24/2022 05:44 AM    LABALBU 3.4 10/24/2022 05:44 AM    BILITOT 1.2 10/24/2022 05:44 AM    ALKPHOS 92 10/24/2022 05:44 AM    AST 88 10/24/2022 05:44 AM    ALT 65 10/24/2022 05:44 AM     Lab Results   Component Value Date/Time    LIPASE 17 10/23/2022 10:08 PM     No results found for: AMYLASE      ASSESSMENT/PLAN:  Patient Active Problem List   Diagnosis    Generalized abdominal pain    Acute proctocolitis ([address]     1. Abnormal CT appearance of the rectum  -Appears constipated  -Further evaluation with nonemergent, possibly outpatient colonoscopy    2. Abnormal MRI consistent with duodenitis  -Plan for further evaluation with direct visualization      3. Constipation  -Bowel regimen    Thank you for the opportunity to see this patient in consultation      Maurice Lin MD  10/24/2022  1:02 PM    NOTE:  This report was transcribed using voice recognition software.   Every effort was made to ensure accuracy; however, inadvertent computerized transcription errors may be present.

## 2022-10-24 NOTE — PROGRESS NOTES
Physical Therapy Treatment Note/Plan of Care    Room #:  0213/6829-40  Patient Name: Kamila Linda  YOB: 1945  MRN: [id number]    Date of Service: 10/24/2022     Tentative placement recommendation: Home Health Physical Therapy   Equipment recommendation: To be determined      Evaluating Physical Therapist: Kerry Poe #[id number]      Specific Provider Orders/Date/Referring Provider :   10/21/22 0600    PT eval and treat  Start:  10/21/22 0600,   End:  10/21/22 0600,   ONE TIME,   Standing Count:  1 Occurrences,   R         Tracey Valdez, DO     Admitting Diagnosis:   Proctocolitis [K52.9]  Diverticulosis [K57.90]  Acute kidney injury ([address] [N17.9]  Idiopathic proctocolitis, other complication ([facility] [address] [R60.404]      Surgery: none  Visit Diagnoses         Codes    Proctocolitis    -  Primary K52.9    Diverticulosis     K57.90    Acute kidney injury ([facility] [address]     N17.9            Patient Active Problem List   Diagnosis    Generalized abdominal pain    Acute proctocolitis ([address]        ASSESSMENT of Current Deficits Patient exhibits decreased strength, balance, and endurance impairing functional mobility, transfers, gait distance, and tolerance to activity. Patient very impulsive and poor safety awareness increase risk of falls. Patient tried to sit up on the edge of the bed while I had to bed elevated for supine exercises and she sat abruptly after ambulating to the door and we just got back to the foot of the bed to sit safely. Pt was short of breath and impaired endurance. Pt needing cueing for safety throughout tx session. The patient will benefit from continued skilled therapy to increase strength and improve balance for safe functional mobility, to decrease risk of falls, and to meet goals at discharge.         PHYSICAL THERAPY  PLAN OF CARE       Physical therapy plan of care is established based on physician order,  patient diagnosis and clinical assessment    Current Treatment Recommendations:    -Bed Mobility: Lower extremity exercises   -Sitting Balance: Incorporate reaching activities to activate trunk muscles   -Standing Balance: Perform strengthening exercises in standing to promote motor control with or without upper extremity support   -Transfers: Provide instruction on proper hand and foot position for adequate transfer of weight onto lower extremities and use of gait device if needed and Cues for hand placement, technique and safety. Provide stabilization to prevent fall   -Gait: Gait training and Standing activities to improve: base of support, weight shift, weight bearing    -Endurance: Utilize Supervised activities to increase level of endurance to allow for safe functional mobility including transfers and gait   -Stairs: Stair training with instruction on proper technique and hand placement on rail    PT long term treatment goals are located in below grid    Patient and or family understand(s) diagnosis, prognosis, and plan of care. Frequency of treatments: Patient will be seen  daily.          Prior Level of Function: Patient ambulated with cane   Rehab Potential: good  for baseline    Past medical history:   Past Medical History:   Diagnosis Date    Hypertension     Lymphoma ([facility] [address]     remission since 2015     Past Surgical History:   Procedure Laterality Date    APPENDECTOMY      CHOLECYSTECTOMY, OPEN      HYSTERECTOMY (CERVIX STATUS UNKNOWN)      INCISIONAL HERNIA REPAIR         SUBJECTIVE:    Precautions:  falls , impulsive, poor safety awareness    Social history: Patient lives with daughter in a two story home resides first  with 3 steps  to enter with Rail  Tub shower grab bars    Equipment owned: Wheelchair, [address] [facility], [address], and Wheeled Walker,      [address] [facility] [facility] Mobility Inpatient   How much difficulty turning over in bed?: A Little  How much difficulty sitting down on / standing up from a chair with arms?: A Little  How much difficulty moving from lying on back to sitting on side of bed?: A Little  How much help from another person moving to and from a bed to a chair?: A Little  How much help from another person needed to walk in hospital room?: A Little  How much help from another person for climbing 3-5 steps with a railing?: A Lot  AM-PAC Inpatient Mobility Raw Score : 17  AM-PAC Inpatient T-Scale Score : 42.13  Mobility Inpatient CMS 0-100% Score: 50.57  Mobility Inpatient CMS G-Code Modifier : CK    Nursing cleared patient for PT treatment. OBJECTIVE;   Initial Evaluation  Date: 10/21/2022 Treatment Date:  10/24/2022       Short Term/ Long Term   Goals   Was pt agreeable to Eval/treatment? Yes yes To be met in 4 days   Pain level   4/10  Abdominal pain No number given  headache    Bed Mobility    Rolling: Minimal assist of 1    Supine to sit: Minimal assist of 1    Sit to supine: Minimal assist of 1    Scooting: Minimal assist of 1   Rolling: Minimal assist of 1   Supine to sit: Minimal assist of 1   Sit to supine: Minimal assist of 1   Scooting: Minimal assist of 1    Rolling: Independent    Supine to sit:  Independent    Sit to supine: Independent    Scooting: Independent     Transfers Sit to stand: Minimal assist of 1 from chair and EOB Sit to stand: Minimal assist of 1 Cues for hand placement and safety     Sit to stand: Independent    Ambulation     2x15 feet using  wheeled walker with Minimal assist of 1   for walker control, balance, upright, and safety, IV pole management  2 x 20 feet using  wheeled walker with Minimal assist of 1   cues for upright posture, safety, pacing, and pursed lip breathing     50 feet using  least restrictive device with Modified Independent    Stair negotiation: ascended and descended   Not assessed     3 steps with HR   ROM Within functional limits    Increase range of motion 10% of affected joints    Strength BUE:  refer to OT eval  RLE:  4-/5  LLE:  4-/5  Increase strength in affected mm groups by 1/3 grade   Balance Sitting EOB:  good -  Dynamic Standing:  fair + wheeled walker   Sitting EOB: good   Dynamic Standing: fair wheeled walker   Sitting EOB:  good   Dynamic Standing: good      Patient is Alert & Oriented x person, place, time, and situation and follows directions    Sensation:  Patient  denies numbness/tingling   Edema:  no   Endurance: fair      Vitals:  2 liters nasal cannula   Blood Pressure at rest  Blood Pressure during session    Heart Rate at rest 127 Heart Rate during session 127-152   SPO2 at rest 99%  SPO2 during session 97-99%     Patient education  Patient educated on role of Physical Therapy, risks of immobility, safety and plan of care,  importance of mobility while in hospital , ankle pumps, quad set and glut set for edema control, blood clot prevention, importance and purpose of adaptive device and adjusted to proper height for the patient. , and safety      Patient response to education:   Pt verbalized understanding Pt demonstrated skill Pt requires further education in this area   Yes Partial Yes      Treatment:  Patient practiced and was instructed/facilitated in the following treatment: Patient performed supine exercises. Pt assisted to EOB. Sat edge of bed 10 minutes with Supervision  to increase dynamic sitting balance and activity tolerance. Pt stood ambulated to the door, and back to the edge of the bed; max cues for safety, very impulsive. Pt sat abruptly but safely on the bed. Pt educated on pursed lip breathing due to shortness of breath. Pt stood, side stepped towards HOB and sat down on the bed. Pt educated on safety and decreasing her risk for falls. Therapeutic Exercises:  ankle pumps, quad sets, heel slide, hip abduction/adduction, and straight leg raise,   x 10 - 15 reps. At end of session, patient in bed with alarm call light and phone within reach,  all lines and tubes intact, nursing notified.       Patient would benefit from continued skilled Physical Therapy to improve functional independence and quality of life. Patient's/ family goals   home    Time in  15:13  Time out  15:51    Total Treatment Time  38 minutes        CPT codes:    Therapeutic activities (48535)   23 minutes  2 unit(s)  Therapeutic exercises (86619)   15 minutes  1 unit(s)    Pancho Hart  [facility]  LIC # [id number]

## 2022-10-24 NOTE — PROGRESS NOTES
Pharmacy Consultation Note  (Antibiotic Dosing and Monitoring)    Initial consult date: 10/24/2022  Consulting physician/provider: Dr. Hernández  Drug: Vancomycin  Indication: Bloodstream Infection    Age/  Gender Height Weight IBW  Allergy Information   77 y.o./female 5' 6\" (167.6 cm) 180 lb (81.6 kg)     Ideal body weight: 59.3 kg (130 lb 11.7 oz)  Adjusted ideal body weight: 71.1 kg (156 lb 13.4 oz)   Bactrim [sulfamethoxazole-trimethoprim], Ibuprofen, and Codeine      Renal Function:  Recent Labs     10/23/22  1608 10/23/22  2208 10/24/22  0544   BUN 84* 87* 91*   CREATININE 2.2* 2.4* 2.5*       Intake/Output Summary (Last 24 hours) at 10/24/2022 0851  Last data filed at 10/24/2022 0501  Gross per 24 hour   Intake 2338.33 ml   Output 600 ml   Net 1738.33 ml       Vancomycin Monitoring:  Trough:  No results for input(s): VANCOTROUGH in the last 72 hours. Random:  No results for input(s): VANCORANDOM in the last 72 hours. Recent Labs     10/21/22  1012   BLOODCULT2 24 Hours no growth        Historical Cultures:  No results found for: ORG  No results for input(s): BC in the last 72 hours. Vancomycin Administration Times:  Recent vancomycin administrations        No vancomycin IV orders with administrations found. Orders not given:            vancomycin (VANCOCIN) 1,500 mg in dextrose 5 % 300 mL IVPB               Assessment:  Patient is a 76 y.o. female who has been initiated on vancomycin for \"bloodstream infection\". 1/2 blood cultures from 10/21 resulted with GPC in clusters. Repeat blood cultures sent. Estimated Creatinine Clearance: 20 mL/min (A) (based on SCr of 2.5 mg/dL (H)). Baseline SCr ~ 1 mg/dL.   To dose vancomycin, pharmacy will be utilizing dosing based off of levels because of patient's renal impairment/insufficiency    Plan:  Vancomycin 1500 mg IV x 1 today  Random level tomorrow with AM labs  Will continue to monitor renal function   Pharmacy to follow    Mando Burkett, DominiqueD, BCPS 10/24/2022 8:53 AM   Ext: 1030

## 2022-10-24 NOTE — PROGRESS NOTES
If heart rates <100, she is cleared from Cardiac stand point for her surgical procedures    Marvin Thompson MD  10/24/2022  5:42 PM

## 2022-10-25 PROBLEM — K52.9 PROCTOCOLITIS: Status: ACTIVE | Noted: 2022-01-01

## 2022-10-25 NOTE — PROGRESS NOTES
Pharmacy Consultation Note  (Antibiotic Dosing and Monitoring)    Vancomycin has been discontinued; pharmacy will sign-off. Please reconsult if needed.     Thank you,  Dominique SimonD.,10/25/2022 1:21 PM

## 2022-10-25 NOTE — PROGRESS NOTES
OT SESSION ATTEMPT     Date:10/25/2022  Patient Name: Dat Milligan  MRN: [id number]  : 1945  Room: [address] [place] [address]     OCCUPATIONAL THERAPY TREATMENT NOTE    [address]      Attempted OT session this date:    [] unavailable due to other medical staff currently with pt   [] on hold per nursing staff   [] on hold per nursing staff secondary to lab / radiology results    [x] declined treatment  this date due to being agitated with NPO status. Benefits of participation in therapy reviewed with pt.    [] off unit   [] Other:     Continue with current OT P. [facility] at a later date/time.       Chapin VENTURA/RANGEL [id number]

## 2022-10-25 NOTE — ANESTHESIA PRE PROCEDURE
Department of Anesthesiology  Preprocedure Note       Name:  Montana Lindo   Age:  76 y.o.  :  1945                                          MRN:  [id number]         Date:  10/25/2022      Surgeon: León Bateman):  Nader Maradiaga MD    Procedure: Procedure(s):  EGD ESOPHAGOGASTRODUODENOSCOPY    Medications prior to admission:   Prior to Admission medications    Medication Sig Start Date End Date Taking?  Authorizing Provider   albuterol sulfate HFA (VENTOLIN HFA) 108 (90 Base) MCG/ACT inhaler Inhale 2 puffs into the lungs every 6 hours as needed for Wheezing    Historical Provider, MD   vitamin B-12 (CYANOCOBALAMIN) 1000 MCG tablet Take 1,000 mcg by mouth daily    Historical Provider, MD   metoprolol succinate (TOPROL XL) 50 MG extended release tablet Take 50 mg by mouth nightly    Historical Provider, MD   aspirin 81 MG EC tablet Take 81 mg by mouth daily    Historical Provider, MD   apixaban (ELIQUIS) 5 MG TABS tablet Take 5 mg by mouth daily    Historical Provider, MD   melatonin 5 MG TABS tablet Take 5 mg by mouth nightly    Historical Provider, MD   methylPREDNISolone (MEDROL) 4 MG tablet Take 2 mg by mouth 3 times daily    Historical Provider, MD       Current medications:    Current Facility-Administered Medications   Medication Dose Route Frequency Provider Last Rate Last Admin    metoprolol succinate (TOPROL XL) extended release tablet 50 mg  50 mg Oral Daily Ami Saul, DO        pantoprazole (PROTONIX) tablet 40 mg  40 mg Oral BID AC Ami Saul, DO        vancomycin (VANCOCIN) 1,250 mg in dextrose 5 % 250 mL IVPB  1,250 mg IntraVENous Once Ami Saul DO        metoprolol (LOPRESSOR) injection 5 mg  5 mg IntraVENous Q5 Min PRN Brice Ferrer DO   5 mg at 10/25/22 0340    piperacillin-tazobactam (ZOSYN) 3,375 mg in sodium chloride 0.9 % 50 mL IVPB (Pars9Jmp)  3,375 mg IntraVENous Q12H Ami Saul DO 12.5 mL/hr at 10/25/22 0634 3,375 mg at 10/25/22 0634    sodium chloride flush 0.9 % injection 5-40 mL  5-40 mL IntraVENous 2 times per day Alex Pastel, DO   20 mL at 10/24/22 2030    sodium chloride flush 0.9 % injection 5-40 mL  5-40 mL IntraVENous PRN Alex Pastel, DO        0.9 % sodium chloride infusion   IntraVENous PRN Alex Pastel, DO        heparin flush 100 UNIT/ML injection 100 Units  1 mL IntraVENous 2 times per day Alex Pastel, DO        heparin flush 100 UNIT/ML injection 100 Units  1 mL IntraCATHeter PRN Alex Pastel, DO        vancomycin [facility]) intermittent dosing (placeholder)   Other RX Placeholder Alex Pastel, DO        potassium chloride 20 mEq, sodium bicarbonate 50 mEq in sodium chloride 0.45 % 1,000 mL infusion   IntraVENous Continuous Alex Pastel,  mL/hr at 10/25/22 0241 New Bag at 10/25/22 0241    fentaNYL (SUBLIMAZE) injection 25 mcg  25 mcg IntraVENous Q4H PRN Alex Pastel, DO   25 mcg at 10/23/22 1140    apixaban (ELIQUIS) tablet 5 mg  5 mg Oral Daily Alex Pastel, DO        vitamin B-12 (CYANOCOBALAMIN) tablet 1,000 mcg  1,000 mcg Oral Daily Alex Pastel, DO   1,000 mcg at 10/22/22 0825    albuterol (PROVENTIL) nebulizer solution 2.5 mg  2.5 mg Nebulization Q4H PRN Alex Pastel, DO        budesonide (PULMICORT) nebulizer suspension 500 mcg  0.5 mg Nebulization BID Alex Pastel, DO   500 mcg at 10/25/22 0550    magnesium sulfate 1000 mg in dextrose 5% 100 mL IVPB  1,000 mg IntraVENous PRN Alex Pastel, DO        sodium phosphate 13.05 mmol in sodium chloride 0.9 % 250 mL IVPB  0.16 mmol/kg IntraVENous PRN Alex Pastel, DO        Or    sodium phosphate 26.1 mmol in sodium chloride 0.9 % 500 mL IVPB  0.32 mmol/kg IntraVENous PRN Alex Pastel, DO        potassium chloride (KLOR-CON M) extended release tablet 40 mEq  40 mEq Oral PRN Alex Pastel, DO        Or    potassium bicarb-citric acid (EFFER-K) effervescent tablet 40 mEq  40 mEq Oral PRN Alex Pastel, DO   40 mEq at 10/22/22 0827    Or    potassium chloride 10 mEq/100 mL IVPB (Peripheral Line)  10 mEq IntraVENous PRN Shanice Eastern, DO        Arformoterol Tartrate [facility]) nebulizer solution 15 mcg  15 mcg Nebulization BID Shanice Eastern, DO   15 mcg at 10/25/22 0550    sodium chloride flush 0.9 % injection 5-40 mL  5-40 mL IntraVENous 2 times per day Shanice Eastern, DO   10 mL at 10/23/22 2140    sodium chloride flush 0.9 % injection 5-40 mL  5-40 mL IntraVENous PRN Shanice Eastern, DO        0.9 % sodium chloride infusion   IntraVENous PRN Shanice Eastern, DO        ondansetron (ZOFRAN-ODT) disintegrating tablet 4 mg  4 mg Oral Q8H PRN [facility], DO        Or    ondansetron [facility]) injection 4 mg  4 mg IntraVENous Q6H PRN [facility], DO   4 mg at 10/23/22 2141    acetaminophen (TYLENOL) tablet 650 mg  650 mg Oral Q6H PRN [facility], DO   650 mg at 10/24/22 1521    Or    acetaminophen (TYLENOL) suppository 650 mg  650 mg Rectal Q6H PRN [facility], DO        sennosides-docusate sodium (SENOKOT-S) 8.6-50 MG tablet 2 tablet  2 tablet Oral BID Shanice Eastern, DO   2 tablet at 10/23/22 2141    polyethylene glycol (GLYCOLAX) packet 17 g  17 g Oral BID Shanice Eastern, DO   17 g at 10/23/22 2140    bisacodyl (DULCOLAX) EC tablet 5 mg  5 mg Oral Daily PRN [facility], DO        lactulose (CHRONULAC) 10 GM/15ML solution 20 g  20 g Oral BID Shanice Eastern, DO   20 g at 10/23/22 2141    ipratropium-albuterol (DUONEB) nebulizer solution 1 ampule  1 ampule Inhalation Q4H WA Shanice Eastern, DO   1 ampule at 10/25/22 0550    nicotine (NICODERM CQ) 21 MG/24HR 1 patch  1 patch TransDERmal Daily [facility],    1 patch at 10/24/22 0920       Allergies:     Allergies   Allergen Reactions    Bactrim [Sulfamethoxazole-Trimethoprim] Shortness Of Breath    Ibuprofen Anaphylaxis    Codeine        Problem List:    Patient Active Problem List   Diagnosis Code    Generalized abdominal pain R10.84    Proctocolitis K52.9       Past Medical History:        Diagnosis Date    Hypertension     Lymphoma ([facility] [address]     remission since 2015       Past Surgical History:        Procedure Laterality Date    APPENDECTOMY      CHOLECYSTECTOMY, OPEN      HYSTERECTOMY (CERVIX STATUS UNKNOWN)      INCISIONAL HERNIA REPAIR         Social History:    Social History     Tobacco Use    Smoking status: Every Day     Packs/day: 2.00     Types: Cigarettes    Smokeless tobacco: Never   Substance Use Topics    Alcohol use: No                                Ready to quit: Not Answered  Counseling given: Not Answered      Vital Signs (Current):   Vitals:    10/25/22 0259 10/25/22 0304 10/25/22 0552 10/25/22 0628   BP:  (!) 114/103  103/65   Pulse:  (!) 110 (!) 106 (!) 115   Resp:  19 20    Temp:  36.6 °C (97.8 °F)     TempSrc:  Oral     SpO2:  98% 98%    Weight: 205 lb (93 kg)      Height:                                                  BP Readings from Last 3 Encounters:   10/25/22 103/65   07/06/22 127/77   07/05/22 (!) 140/85       NPO Status:  10/24/22 @2200                                                                               BMI:   Wt Readings from Last 3 Encounters:   10/25/22 205 lb (93 kg)   07/06/22 170 lb (77.1 kg)   07/05/22 171 lb (77.6 kg)     Body mass index is 33.09 kg/m².     CBC:   Lab Results   Component Value Date/Time    WBC 9.7 10/25/2022 03:40 AM    RBC 4.51 10/25/2022 03:40 AM    HGB 11.8 10/25/2022 03:40 AM    HCT 38.3 10/25/2022 03:40 AM    MCV 84.9 10/25/2022 03:40 AM    RDW 17.9 10/25/2022 03:40 AM     10/25/2022 03:40 AM       CMP:   Lab Results   Component Value Date/Time     10/25/2022 03:40 AM    K 4.4 10/25/2022 03:40 AM    K 3.3 07/06/2022 08:30 AM     10/25/2022 03:40 AM    CO2 22 10/25/2022 03:40 AM    BUN 88 10/25/2022 03:40 AM    CREATININE 2.4 10/25/2022 03:40 AM    GFRAA >60 07/06/2022 08:30 AM    LABGLOM 20 10/25/2022 03:40 AM    GLUCOSE 112 10/25/2022 03:40 AM    PROT 5.5 10/25/2022 03:40 AM    CALCIUM 8.4 10/25/2022 03:40 AM    BILITOT 1.1 10/25/2022 03:40 AM    ALKPHOS 86 10/25/2022 03:40 AM     10/25/2022 03:40 AM    ALT 83 10/25/2022 03:40 AM       POC Tests: No results for input(s): POCGLU, POCNA, POCK, POCCL, POCBUN, POCHEMO, POCHCT in the last 72 hours. Coags: No results found for: PROTIME, INR, APTT    HCG (If Applicable): No results found for: PREGTESTUR, PREGSERUM, HCG, HCGQUANT     ABGs: No results found for: PHART, PO2ART, OGE1MOQ, IID2BRA, BEART, Q9GEETEB     Type & Screen (If Applicable):  No results found for: LABABO, LABRH    Drug/Infectious Status (If Applicable):  No results found for: HIV, HEPCAB    COVID-19 Screening (If Applicable):   Lab Results   Component Value Date/Time    COVID19 Not Detected 07/05/2022 01:57 PM     EKG: 10/23/22  Ventricular Rate   115  94  75    Atrial Rate   117  33  75    QRS Duration ms 86  80  72  78    Q-T Interval ms 326  326  348  378    QTc Calculation (Bazett) ms 456  450  435  422    R Axis degrees -76  174  -84  -62    T Axis degrees 96  22  102  58    [facility]           Narrative & Impression    Atrial fibrillation with rapid ventricular response  Left axis deviation  Inferior infarct , age undetermined  Anterior infarct (cited on or before 05-JUL-2022)  Abnormal ECG  When compared with ECG of 06-JUL-2022 06:44,  Left posterior fascicular block is no longer present  Inferior infarct is now present  Non-specific change in ST segment in Lateral leads  Nonspecific T wave abnormality now evident in Lateral leads                 Anesthesia Evaluation  Patient summary reviewed and Nursing notes reviewed no history of anesthetic complications:   Airway: Mallampati: III  TM distance: <3 FB   Neck ROM: full  Mouth opening: > = 3 FB   Dental:    (+) upper dentures and lower dentures      Pulmonary:   (+) shortness of breath: recurrent,  decreased breath sounds asthma:     (-) not a current smoker  Sleep apnea:  former. Patient did not smoke on day of surgery.                 ROS comment: 2L NC Cardiovascular:  Exercise tolerance: poor (<4 METS),   (+) hypertension:, dysrhythmias: atrial fibrillation, hyperlipidemia      ECG reviewed  Rhythm: regular  Rate: normal           Beta Blocker:  Dose within 24 Hrs         Neuro/Psych:   (+) depression/anxiety             GI/Hepatic/Renal:   (+) GERD:,           Endo/Other:    (+) blood dyscrasia ( eliquis): anticoagulation therapy, arthritis: OA., . Pt had no PAT visit        ROS comment: Lymphoma  Abdominal:             Vascular: negative vascular ROS. Other Findings:           Anesthesia Plan      MAC     ASA 3       Induction: intravenous. MIPS: Prophylactic antiemetics administered. Anesthetic plan and risks discussed with patient. Use of blood products discussed with patient whom consented to blood products. Plan discussed with CRNA and attending.                     Gillian Joyce RN   10/25/2022

## 2022-10-25 NOTE — PROGRESS NOTES
Internal Medicine Progress Note    PRESTON=[facility]    Libby Howard. ONEIL Amaya D.O., ONEIL Bustillos D.O. Verna Singh, MSN, APRN, NP-C  La Estrada. Manuel Landrum, MSN, APRN-CNP     Primary Care Physician: Kevin Urbano MD   Admitting Physician:  Sharmaine Whittaker DO  Admission date and time: 10/21/2022  4:18 AM    Room:  [address] [place] [address]  Admitting diagnosis: Proctocolitis [K52.9]  Diverticulosis [K57.90]  Acute kidney injury [facility]) [N17.9]  Idiopathic proctocolitis, other complication [facility]) [X27.402]    Patient Name: Adam Felix  MRN: [id number]    Date of Service: 10/25/2022     Subjective:  Herman Lucio is a 76 y.o. female who was seen and examined today,10/25/2022, at the bedside. I had an extensive conversation with Emily Levee at the bedside and spent a great deal of time explaining results of testing and plan test moving forward. She is fixated on eating and does not wish to pursue any intervention including the possibility of EGD. She admits to irritation associated with the Vargas catheter. She remains weak and deconditioned. No family members are present during my examination. Multiple subspecialists are providing consultation. Review of System:   Constitutional:   Resting very comfortably today. Agitated as she is requesting advancement of diet. HEENT:   Denies ear pain, sore throat, sinus or eye problems. Maintained on nasal cannula oxygen. Cardiovascular:   Denies chest pain or chest discomfort. Admits to resolved palpitations. Respiratory:   Shortness of breath continues to improve. Gastrointestinal:   Abdominal pain has entirely resolved. She is having adequate bowel movements. She is requesting advancement in her diet. Genitourinary:    Admits to significant irritation associated with the Vargas catheter. Extremities:   Denies lower extremity swelling, edema or cyanosis.    Neurology:    Denies any headache or focal neurological deficits, admits to generalized weakness and deconditioning. Psch:   Denies being anxious or depressed. I question early dementia   Musculoskeletal:    Denies  myalgias, joint complaints or back pain. Integumentary:   Denies any rashes, ulcers, or excoriations. Denies bruising. Hematologic/Lymphatic:  Denies bruising or bleeding. Physical Exam:  No intake/output data recorded. Intake/Output Summary (Last 24 hours) at 10/25/2022 0931  Last data filed at 10/25/2022 0296  Gross per 24 hour   Intake 0 ml   Output 900 ml   Net -900 ml   I/O last 3 completed shifts: In: 2142.4 [P.O.:300; I.V.:1738.3; IV Piggyback:104.1]  Out: 1150 [Urine:1150]  Patient Vitals for the past 96 hrs (Last 3 readings):   Weight   10/25/22 0259 205 lb (93 kg)   10/24/22 0458 196 lb (88.9 kg)     Vital Signs:   Blood pressure 103/65, pulse (!) 115, temperature 97.8 °F (36.6 °C), temperature source Oral, resp. rate 20, height 5' 6\" (1.676 m), weight 205 lb (93 kg), SpO2 98 %. General appearance:  Very comfortable appearing today. She is fixated on eating. Head:  Normocephalic. No masses, lesions or tenderness. Eyes:  PERRLA. EOMI. Sclera clear. Buccal mucosa moist.  ENT:  Ears normal. Mucosa normal.  Nasal cannula oxygen is in place. Neck:    Supple. Trachea midline. No thyromegaly. No JVD. No bruits. Heart:    Rhythm regular. Rate controlled. Systolic murmur. Lungs:    Symmetrical. Clear to auscultation bilaterally. No wheezes. No rhonchi. No rales. Abdomen:   Soft. Mildly tender to palpation diffusely but significant improvement. Extremities:    Peripheral pulses present. No peripheral edema. No ulcers. No cyanosis. No clubbing. Neurologic:    Alert x 3. No focal deficit. Cranial nerves grossly intact. No focal weakness. Psych:   I question early dementia. She becomes easily agitated. Musculoskeletal:   Spine ROM normal. Muscular strength intact. Gait not assessed.   Integumentary:  No rashes Skin normal color and texture.   Genitalia/Breast:  Deferred    Medication:  Scheduled Meds:   metoprolol succinate  50 mg Oral Daily    pantoprazole  40 mg Oral BID AC    piperacillin-tazobactam  3,375 mg IntraVENous Q12H    sodium chloride flush  5-40 mL IntraVENous 2 times per day    heparin flush  1 mL IntraVENous 2 times per day    vancomycin (VANCOCIN) intermittent dosing (placeholder)   Other RX Placeholder    apixaban  5 mg Oral Daily    vitamin B-12  1,000 mcg Oral Daily    budesonide  0.5 mg Nebulization BID    Arformoterol Tartrate  15 mcg Nebulization BID    sodium chloride flush  5-40 mL IntraVENous 2 times per day    sennosides-docusate sodium  2 tablet Oral BID    polyethylene glycol  17 g Oral BID    lactulose  20 g Oral BID    ipratropium-albuterol  1 ampule Inhalation Q4H WA    nicotine  1 patch TransDERmal Daily     Continuous Infusions:   sodium chloride      IV infusion builder 100 mL/hr at 10/25/22 0241    sodium chloride         Objective Data:  CBC with Differential:    Lab Results   Component Value Date/Time    WBC 9.7 10/25/2022 03:40 AM    RBC 4.51 10/25/2022 03:40 AM    HGB 11.8 10/25/2022 03:40 AM    HCT 38.3 10/25/2022 03:40 AM     10/25/2022 03:40 AM    MCV 84.9 10/25/2022 03:40 AM    MCH 26.2 10/25/2022 03:40 AM    MCHC 30.8 10/25/2022 03:40 AM    RDW 17.9 10/25/2022 03:40 AM    LYMPHOPCT 14.0 10/25/2022 03:40 AM    MONOPCT 10.1 10/25/2022 03:40 AM    BASOPCT 0.3 10/25/2022 03:40 AM    MONOSABS 0.98 10/25/2022 03:40 AM    LYMPHSABS 1.35 10/25/2022 03:40 AM    EOSABS 0.04 10/25/2022 03:40 AM    BASOSABS 0.03 10/25/2022 03:40 AM     CMP:    Lab Results   Component Value Date/Time     10/25/2022 03:40 AM    K 4.4 10/25/2022 03:40 AM    K 3.3 07/06/2022 08:30 AM     10/25/2022 03:40 AM    CO2 22 10/25/2022 03:40 AM    BUN 88 10/25/2022 03:40 AM    CREATININE 2.4 10/25/2022 03:40 AM    GFRAA >60 07/06/2022 08:30 AM    LABGLOM 20 10/25/2022 03:40 AM    GLUCOSE 112 10/25/2022 03:40 AM    PROT 5.5 10/25/2022 03:40 AM    LABALBU 3.3 10/25/2022 03:40 AM    CALCIUM 8.4 10/25/2022 03:40 AM    BILITOT 1.1 10/25/2022 03:40 AM    ALKPHOS 86 10/25/2022 03:40 AM     10/25/2022 03:40 AM    ALT 83 10/25/2022 03:40 AM       Assessment:  Acute proctocolitis in the setting of severe constipation   Ongoing abdominal pain with radiographic evidence of duodenitis  Bacteremia with Staphylococcus x1 bottle  Acute renal failure that is multifactorial in nature  Atrial fibrillation with intermittent periods of rapid ventricular response  History of non-Hodgkin's lymphoma currently in remission  COPD with ongoing tobacco abuse  Early dementia    Plan:   Caroline Alvarez is difficult to redirect. She is alert and oriented but is fixated on eating. She does not wish to pursue any aggressive intervention including EGD evaluation despite having the diagnosis of duodenitis. She describes no additional abdominal pain and demands the institution of a diet. At this point, I believe her clinical condition has improved to a point where we would be doing more harm than good to her by continuing to hold food. (She states she will leave AMA today if she does not receive food). A diet will be instituted and the GI team will be updated that she does not wish to pursue endoscopic intervention. We will resume anticoagulation therapy. Renal function is stabilizing in the setting of contrast nephropathy complicated by hypoperfusion. Fluid resuscitation is being undertaken and Vargas catheter is in place with increasing urinary output. Creatinine is being monitored closely. She requires extensive work with the therapy teams. 2D echocardiogram is pending. I believe the patient would benefit from temporary skilled nursing facility placement but the patient defers this. Discharge planning will be worked out over the next several days.   We appreciate the input from the multiple subspecialist providing care.    More than 50% of my  time was spent at the bedside counseling/coordinating care with the patient and/or family with face to face contact. This time was spent reviewing notes and laboratory data as well as instructing and counseling the patient. Time I spent with the family or surrogate(s) is included only if the patient was incapable of providing the necessary information or participating in medical decisions. I also discussed the differential diagnosis and all of the proposed management plans with the patient and individuals accompanying the patient. Debo Bush requires this high level of physician care and nursing on the IMC/Telemetry unit due the complexity of decision management and chance of rapid decline or death. Continued cardiac monitoring and higher level of nursing are required. I am readily available for any further decision-making and intervention.      Cristain Grayson DO, F.A.C.O.I.  10/25/2022  9:31 AM

## 2022-10-25 NOTE — CARE COORDINATION
SS NOTE: Pt has a telesitter due to impulsive getting up out of bed. Pt is on room air. Pt to have an ECHO. GI is on and she is to have an EGD. Per nursing pt has worsening renal function. She has a Midline and is on IV Vanc and Protonix. PT / OT are on- 2 x 20 feet using  wheeled walker with Minimal assist.. Per 4th floor SW pt was set to return home on Sunday after conversation with pt's dtr and granddtr regarding SNF vs HHC. They insist that pt return home. A HHC referral started with [facility] for start of care Thursday (10/27)- [facility] orders are needed to complete that referral. SS to continue. [facility] [employer]. 10/25/2022.12:23 PM.

## 2022-10-25 NOTE — PROGRESS NOTES
Nephrology Progress Note  [facility]  Duke Tafoya MD    From consult 10/24/22-  HPI:   Frida Rm is a 76year old female who came to the hospital 10/21 for evaluation of lower abdominal pain and constipation. She was admitted for pain management and mild proctocolitis. She has a PMH of non-Hodgkin's lymphoma in remission since 2015. During her work up she had a CT with IV contrast on 10/21. At that time her creatinine was 1.5 mg/dl and has since risen to 2.5 mg/dl today and renal consult was requested. She has also been dosed with Vancomycin, awaiting Vanc level Zero eosinophils in urine  It appears she was seen in Jan 2022 at [facility] for [facility]GI. Baseline creatinine from 7/2022 is 1.0-1.1 mg/dl. She is confused and is unable to provide any meaningful history. 10/25/22- more alert today. She is upset she is not able to eat. She is for EGD today.      PMH:    Past Medical History:   Diagnosis Date    Hypertension     Lymphoma ([address]     remission since 2015       Patient Active Problem List   Diagnosis    Generalized abdominal pain    Proctocolitis       Meds:     metoprolol succinate  50 mg Oral Daily    pantoprazole  40 mg Oral BID AC    vancomycin  1,250 mg IntraVENous Once    piperacillin-tazobactam  3,375 mg IntraVENous Q12H    sodium chloride flush  5-40 mL IntraVENous 2 times per day    heparin flush  1 mL IntraVENous 2 times per day    vancomycin (VANCOCIN) intermittent dosing (placeholder)   Other RX Placeholder    apixaban  5 mg Oral Daily    vitamin B-12  1,000 mcg Oral Daily    budesonide  0.5 mg Nebulization BID    Arformoterol Tartrate  15 mcg Nebulization BID    sodium chloride flush  5-40 mL IntraVENous 2 times per day    sennosides-docusate sodium  2 tablet Oral BID    polyethylene glycol  17 g Oral BID    lactulose  20 g Oral BID    ipratropium-albuterol  1 ampule Inhalation Q4H WA    nicotine  1 patch TransDERmal Daily        sodium chloride      IV infusion builder 100 mL/hr at 10/25/22 0304 (!) 114/103 97.8 °F (36.6 °C) Oral (!) 110 19 98 % --   10/25/22 0259 -- -- -- -- -- -- 205 lb (93 kg)   10/24/22 2015 109/85 97.8 °F (36.6 °C) Oral 99 20 93 % --   10/24/22 1812 -- -- -- (!) 133 -- -- --   10/24/22 1329 105/79 -- -- (!) 130 -- -- --   10/24/22 1315 95/85 -- -- (!) 144 20 -- --   10/24/22 1222 106/85 98.9 °F (37.2 °C) Axillary (!) 123 20 100 % --         Intake/Output Summary (Last 24 hours) at 10/25/2022 1117  Last data filed at 10/25/2022 6680  Gross per 24 hour   Intake 0 ml   Output 900 ml   Net -900 ml       Constitutional: Patient appears stated age  Head: normocephalic, atraumatic   Neck: supple, no jvd  Cardiovascular: tachycardic- irregular  Respiratory: Clear upper, diminished in bases   Gastrointestinal: soft, nontender, distended  Ext: no edema   Neuro: awake and alert  Skin: dry, no rash       Data:    Recent Labs     10/23/22  0500 10/24/22  0544 10/25/22  0340   WBC 9.2 9.7 9.7   HGB 12.3 12.3 11.8   HCT 40.2 39.4 38.3   MCV 85.9 86.0 84.9    310 271       Recent Labs     10/23/22  0500 10/23/22  1608 10/24/22  0544 10/24/22  1422 10/24/22  2306 10/25/22  0340 10/25/22  1009      < > 142   < > 140 139 140   K 4.8   < > 4.7   < > 4.3 4.4 4.6      < > 104   < > 103 102 101   CO2 17*   < > 21*   < > 22 22 27   CREATININE 2.0*   < > 2.5*   < > 2.5* 2.4* 2.3*   BUN 74*   < > 91*   < > 90* 88* 83*   LABGLOM 25   < > 19   < > 19 20 21   GLUCOSE 144*   < > 133*   < > 114* 112* 108*   CALCIUM 9.0   < > 8.8   < > 8.4* 8.4* 8.7   PHOS 4.9*  --  5.3*  --   --  4.5  --    MG 2.6  --  2.6  --   --  2.6  --     < > = values in this interval not displayed.        No results found for: VITD25    No results found for: PTH    Recent Labs     10/23/22  0500 10/24/22  0544 10/25/22  0340   ALT 41* 65* 83*   AST 53* 88* 111*   ALKPHOS 99 92 86   BILITOT 1.0 1.2 1.1   BILIDIR 0.5* 0.5* 0.7*       Recent Labs     10/23/22  0500 10/24/22  0544 10/25/22  0340   LABALBU 3.6 3. 4* 3.3*       No results found for: FERRITIN, IRON, TIBC    No results found for: MEKBVLRH16    No results found for: FOLATE      Lab Results   Component Value Date/Time    COLORU Yellow 12/13/2017 04:10 PM    NITRU Negative 12/13/2017 04:10 PM    GLUCOSEU Negative 12/13/2017 04:10 PM    KETUA Negative 12/13/2017 04:10 PM    UROBILINOGEN 0.2 12/13/2017 04:10 PM    BILIRUBINUR Negative 12/13/2017 04:10 PM       Lab Results   Component Value Date/Time    OSMOU 569 10/21/2022 06:31 AM       No components found for: URIC    No results found for: [address] [facility] [[id number]] Collected: 10/23/22 1112      Order Status: Completed Updated: 10/23/22 1116     Narrative:       EXAMINATION:   RETROPERITONEAL ULTRASOUND OF THE KIDNEYS AND URINARY BLADDER     10/23/2022     COMPARISON:   None     HISTORY:   ORDERING SYSTEM PROVIDED HISTORY: ARF   TECHNOLOGIST PROVIDED HISTORY:     Reason for exam:->ARF   What reading provider will be dictating this exam?->CRC     FINDINGS:     Kidneys: The right kidney measures 9.7 cm in length and the left kidney measures 8.9   cm in length. Cortex within normal limits measuring 7 mm on the right and 9   mm on the left. Kidneys demonstrate normal cortical echogenicity. No evidence of   hydronephrosis or intrarenal stones. However, there is cyst lower pole left   kidney measuring 1.6 x 1.4 x 1.1 cm. Impression:       Simple appearing cyst lower pole left kidney. Otherwise, negative ultrasound   of the kidneys. Assessment and Plan:    Acute kidney injury-  Likely in the setting of contrast.  BP's trending on the lower side in the  range she is also a-fib RVR  Baseline creatinine 1.0-1.1 mg/dl  FeNa <1%  Renal US- no hydro, simple cyst.   Creatinine 1.6-->2.5->2.3mg/dl  Continue  IVF as she is NPO  UOP as recorded 900 ml past 24 hours. Continue IVF  Follow labs    2.  HAG Metabolic acidosis with elevated lactic acid-  Started on IV HCO3- will change IVF  CO2 20->17->20->21->27  Follow     3. Hypotension-  In the setting of a-fib RVR  On lopressor for rate control  BP's past 12 hours stable.      4. Hyperphosphatemia-  In the setting of AMGGI  Will follow   PO4 5.3-->4.5      MASSIMO Waterman - CNP  Pt seen and examined agree with above  Cr starting to improve  Continue ivf  Dye induced atmarcela Carmona MD

## 2022-10-25 NOTE — PLAN OF CARE
Problem: Pain  Goal: Verbalizes/displays adequate comfort level or baseline comfort level  10/24/2022 2152 by Mariela Park RN  Outcome: Progressing  10/24/2022 2030 by Brown Jenkins RN  Outcome: Progressing     Problem: Safety - Adult  Goal: Free from fall injury  10/24/2022 2152 by Mariela Park RN  Outcome: Progressing  10/24/2022 2030 by Brown Jenkins RN  Outcome: Progressing  [address] (Taken 10/24/2022 2000 by Mariela Park RN)  Free From Fall Injury:   Instruct family/caregiver on patient safety   Based on caregiver fall risk screen, instruct family/caregiver to ask for assistance with transferring infant if caregiver noted to have fall risk factors     Problem: ABCDS Injury Assessment  Goal: Absence of physical injury  10/24/2022 2152 by Mariela Park RN  Outcome: Progressing  10/24/2022 2030 by Brown Jenkins RN  Outcome: Progressing  [address] (Taken 10/24/2022 2000 by Mariela Park RN)  Absence of Physical Injury: Implement safety measures based on patient assessment     Problem: Discharge Planning  Goal: Discharge to home or other facility with appropriate resources  10/24/2022 2152 by Mariela Park RN  Outcome: Progressing  10/24/2022 2030 by Brown Jenkins RN  Outcome: Progressing  Flowsheets (Taken 10/24/2022 2000 by Mariela Park RN)  Discharge to home or other facility with appropriate resources:   Identify barriers to discharge with patient and caregiver   Arrange for needed discharge resources and transportation as appropriate   Identify discharge learning needs (meds, wound care, etc)     Problem: Skin/Tissue Integrity  Goal: Absence of new skin breakdown  Description: 1. Monitor for areas of redness and/or skin breakdown  2. Assess vascular access sites hourly  3. Every 4-6 hours minimum:  Change oxygen saturation probe site  4.   Every 4-6 hours:  If on nasal continuous positive airway pressure, respiratory therapy assess nares and determine need for appliance change or resting period.   Outcome: Progressing

## 2022-10-25 NOTE — PROGRESS NOTES
Physical Therapy Treatment Note/Plan of Care    Room #:  6287/8498-45  Patient Name: Dat Milligan  YOB: 1945  MRN: [id number]    Date of Service: 10/25/2022     Tentative placement recommendation: Home Health Physical Therapy   Equipment recommendation: To be determined      Evaluating Physical Therapist: Deven Jama, [facility] #[id number]      Specific Provider Orders/Date/Referring Provider :   10/21/22 0600    PT eval and treat  Start:  10/21/22 0600,   End:  10/21/22 0600,   ONE TIME,   Standing Count:  1 Occurrences,   R         Matt Price DO     Admitting Diagnosis:   Proctocolitis [K52.9]  Diverticulosis [K57.90]  Acute kidney injury ([facility] [N17.9]  Idiopathic proctocolitis, other complication ([facility] [N34.584]      Surgery: none  Visit Diagnoses         Codes    Proctocolitis    -  Primary K52.9    Diverticulosis     K57.90    Acute kidney injury ([facility]     N17.9            Patient Active Problem List   Diagnosis    Generalized abdominal pain    Proctocolitis        ASSESSMENT of Current Deficits Patient exhibits decreased strength, balance, and endurance impairing functional mobility, transfers, gait distance, and tolerance to activity. Patient self limiting, only wanting to preform supine exercises. Patient states she is experiencing a lot of pain in butt and insertion of bustamante catheter. Patient required max encouragement to participate with therapy. Education provided on importance of mobility while in hospital, however patient is adamant on no out of bed activities. The patient will benefit from continued skilled therapy to increase strength and improve balance for safe functional mobility, to decrease risk of falls, and to meet goals at discharge.         PHYSICAL THERAPY  PLAN OF CARE       Physical therapy plan of care is established based on physician order,  patient diagnosis and clinical assessment    Current Treatment Recommendations:    -Bed Mobility: Lower extremity exercises   -Sitting Balance: Incorporate reaching activities to activate trunk muscles   -Standing Balance: Perform strengthening exercises in standing to promote motor control with or without upper extremity support   -Transfers: Provide instruction on proper hand and foot position for adequate transfer of weight onto lower extremities and use of gait device if needed and Cues for hand placement, technique and safety. Provide stabilization to prevent fall   -Gait: Gait training and Standing activities to improve: base of support, weight shift, weight bearing    -Endurance: Utilize Supervised activities to increase level of endurance to allow for safe functional mobility including transfers and gait   -Stairs: Stair training with instruction on proper technique and hand placement on rail    PT long term treatment goals are located in below grid    Patient and or family understand(s) diagnosis, prognosis, and plan of care. Frequency of treatments: Patient will be seen  daily.          Prior Level of Function: Patient ambulated with cane   Rehab Potential: good  for baseline    Past medical history:   Past Medical History:   Diagnosis Date    Hypertension     Lymphoma ([facility]     remission since 2015     Past Surgical History:   Procedure Laterality Date    APPENDECTOMY      CHOLECYSTECTOMY, OPEN      HYSTERECTOMY (CERVIX STATUS UNKNOWN)      INCISIONAL HERNIA REPAIR         SUBJECTIVE:    Precautions:  falls , impulsive, poor safety awareness    Social history: Patient lives with daughter in a two story home resides first  with 3 steps  to enter with Rail  Tub shower grab bars    Equipment owned: Wheelchair, [employer], and Wheeled Walker,      [address] [place] [address],[address] Mobility Inpatient   How much difficulty turning over in bed?: A Little  How much difficulty sitting down on / standing up from a chair with arms?: A Lot  How much difficulty moving from lying on back to sitting on side of bed?: A Little  How much help from another person moving to and from a bed to a chair?: A Little  How much help from another person needed to walk in hospital room?: A Lot  How much help from another person for climbing 3-5 steps with a railing?: A Lot  AM-PAC Inpatient Mobility Raw Score : 15  AM-PAC Inpatient T-Scale Score : 39.45  Mobility Inpatient CMS 0-100% Score: 57.7  Mobility Inpatient CMS G-Code Modifier : CK    Nursing cleared patient for PT treatment. Only agreeable to supine exercises. OBJECTIVE;   Initial Evaluation  Date: 10/21/2022 Treatment Date:  10/25/2022       Short Term/ Long Term   Goals   Was pt agreeable to Eval/treatment? Yes yes To be met in 4 days   Pain level   4/10  Abdominal pain Pain in butt and insertion of catheter    Bed Mobility    Rolling: Minimal assist of 1    Supine to sit: Minimal assist of 1    Sit to supine: Minimal assist of 1    Scooting: Minimal assist of 1   Rolling: Minimal assist of 1   Supine to sit: Not assessed    Sit to supine: Not assessed    Scooting: Maximal assist of  2 to head of bed    Rolling: Independent    Supine to sit:  Independent    Sit to supine: Independent    Scooting: Independent     Transfers Sit to stand: Minimal assist of 1 from chair and EOB Sit to stand: Not assessed       Sit to stand: Independent    Ambulation     2x15 feet using  wheeled walker with Minimal assist of 1   for walker control, balance, upright, and safety, IV pole management  not assessed      50 feet using  least restrictive device with Modified Independent    Stair negotiation: ascended and descended   Not assessed     3 steps with HR   ROM Within functional limits    Increase range of motion 10% of affected joints    Strength BUE:  refer to OT eval  RLE:  4-/5  LLE:  4-/5  Increase strength in affected mm groups by 1/3 grade   Balance Sitting EOB:  good -  Dynamic Standing:  fair + wheeled walker   Sitting EOB: not assessed   Dynamic Standing: not assessed     Sitting EOB:  good   Dynamic Standing: good Patient is Alert & Oriented x person, place, time, and situation and follows directions    Sensation:  Patient  denies numbness/tingling   Edema:  no   Endurance: fair      Vitals:  2 liters nasal cannula   Blood Pressure at rest  Blood Pressure during session    Heart Rate at rest  Heart Rate during session    SPO2 at rest %  SPO2 during session %     Patient education  Patient educated on role of Physical Therapy, risks of immobility, safety and plan of care,  importance of mobility while in hospital , ankle pumps, quad set and glut set for edema control, blood clot prevention, and safety      Patient response to education:   Pt verbalized understanding Pt demonstrated skill Pt requires further education in this area   Yes Partial Yes      Treatment:  Patient practiced and was instructed/facilitated in the following treatment: Patient assisted with rolling in bed to check for BM and applying zinc to bottom for redness. Patient assisted to head of bed and performed supine exercises. Therapeutic Exercises:  ankle pumps, quad sets, hip abduction/adduction, and straight leg raise,   x 10reps. At end of session, patient in bed with alarm call light and phone within reach,  all lines and tubes intact, nursing notified. Patient would benefit from continued skilled Physical Therapy to improve functional independence and quality of life.          Patient's/ family goals   home    Time in  118  Time out  136    Total Treatment Time  18 minutes      CPT codes:  Therapeutic activities (29846)   18 minutes  1 unit(s)    [facility] [place]  #[id number]

## 2022-10-25 NOTE — PROGRESS NOTES
GENERAL SURGERY  DAILY PROGRESS NOTE  10/25/2022    Subjective:  No new complaints overnight states she tolerated diet yesterday without nausea or vomiting. She is passing gas and having bowel movements. She was made n.p.o. today for EGD with GI    Objective:  /65   Pulse (!) 115   Temp 97.8 °F (36.6 °C) (Oral)   Resp 20   Ht 5' 6\" (1.676 m)   Wt 205 lb (93 kg)   SpO2 98%   BMI 33.09 kg/m²     Gen: alert, oriented, no apparent distress  HEENT: NCAT, anicteric  CV: Tachycardic  Pulm: nonlabored breathing on 2 L nasal cannula  Abdomen: soft, nontender, nondistended  Skin: warm and dry    Assessment/Plan:  76 y.o. female resolved proctocolitis    Okay for diet from surgical standpoint  Okay for discharge from surgical standpoint, follow-up outpatient as needed  Feel free to call with any questions  Discussed with Dr. Michelle Bunn    Electronically signed by Demetria Ramirez MD on 10/25/2022 at 7:13 AM     As above  For endoscopy with GI today. Will be available prn  Please call with questions.      Aristides Concepcion MD

## 2022-10-25 NOTE — PROGRESS NOTES
NAME: Marlen Lakhani  MR:  [id number]  :   1945  Admit Date:  10/21/2022      This is a face to face encounter with Marlen Lakhani 76 y.o. female on 10/25/22  Elements of this note, including Diagnosis,  Interval History, Past Medical/Surgical/Family/Social Histories, ROS, physical exam, and Assessment and Plan were copied and pasted from Previous. Updates have been made where noted and reflect current exam and medical decision making from the DOS of this encounter. CHIEF COMPLAINT     ID following for   Chief Complaint   Patient presents with    Abdominal Pain     Was seen at [facility] yesterday, not feeling any better at this time, when asked regarding pain, patient states I feel a lump here pointing to scar right mid to lower quadrant, states from previous hernia. HISTORY OF PRESENT ILLNESS         Assessment & Plan   Proctocolitis/perirectal dermatitis   Cons bacteremia  transaminitis  Spencer        vancomycin (VANCOCIN) 1,250 mg in dextrose 5 % 250 mL IVPB, Once will stop  piperacillin-tazobactam (ZOSYN) 3,375 mg in sodium chloride 0.9 % 50 mL IVPB ([id number]), Q12H  Will switch to po   Skin care     sennosides-docusate sodium (SENOKOT-S) 8.6-50 MG tablet 2 tablet, BID  polyethylene glycol (GLYCOLAX) packet 17 g, BID  bisacodyl (DULCOLAX) EC tablet 5 mg, Daily PRN  lactulose (CHRONULAC) 10 GM/15ML solution 20 g, BID      Pt seen and examined  DOS  10/25/22  In bed has no c/o fc/n/v/d no solid bm    Patient is tolerating medications. No reported adverse drug reactions. REVIEW OF SYSTEMS     As stated above in the chief complaint, otherwise negative.   CURRENT MEDICATIONS     Current Facility-Administered Medications:     metoprolol succinate (TOPROL XL) extended release tablet 50 mg, 50 mg, Oral, Daily, Karine Sic, DO, 50 mg at 10/25/22 1140    pantoprazole (PROTONIX) tablet 40 mg, 40 mg, Oral, BID AC, Karine Sic, DO    vancomycin (VANCOCIN) 1,250 mg in dextrose 5 % 250 mL IVPB, 1,250 mg, IntraVENous, Once, Mabelene Sandifer, DO    dextrose 5 % and 0.45 % sodium chloride infusion, , IntraVENous, Continuous, Marcos Rodriguez, APRN - CNP    metoprolol (LOPRESSOR) injection 5 mg, 5 mg, IntraVENous, Q5 Min PRN, Freed Render, DO, 5 mg at 10/25/22 0340    piperacillin-tazobactam (ZOSYN) 3,375 mg in sodium chloride 0.9 % 50 mL IVPB ([id number]), 3,375 mg, IntraVENous, Q12H, Mabelene Sandifer, DO, Stopped at 10/25/22 1034    sodium chloride flush 0.9 % injection 5-40 mL, 5-40 mL, IntraVENous, 2 times per day, Mabelene Sandifer, DO, 20 mL at 10/24/22 2030    sodium chloride flush 0.9 % injection 5-40 mL, 5-40 mL, IntraVENous, PRN, Mabelene Sandifer, DO    0.9 % sodium chloride infusion, , IntraVENous, PRN, Mabelene Sandifer, DO    heparin flush 100 UNIT/ML injection 100 Units, 1 mL, IntraVENous, 2 times per day, Mabelene Sandifer, DO    heparin flush 100 UNIT/ML injection 100 Units, 1 mL, IntraCATHeter, PRN, Mabelene Sandifer, DO    vancomycin (VANCOCIN) intermittent dosing (placeholder), , Other, RX Placeholder, Mabelene Sandifer, DO    fentaNYL (SUBLIMAZE) injection 25 mcg, 25 mcg, IntraVENous, Q4H PRN, Mabelene Sandifer, DO, 25 mcg at 10/23/22 1140    apixaban (ELIQUIS) tablet 5 mg, 5 mg, Oral, Daily, Mabelene Sandifer, DO    vitamin B-12 (CYANOCOBALAMIN) tablet 1,000 mcg, 1,000 mcg, Oral, Daily, Mabelene Sandifer, DO, 1,000 mcg at 10/22/22 0825    albuterol (PROVENTIL) nebulizer solution 2.5 mg, 2.5 mg, Nebulization, Q4H PRN, Mabelene Sandifer, DO    budesonide (PULMICORT) nebulizer suspension 500 mcg, 0.5 mg, Nebulization, BID, Mabelene Sandifer, DO, 500 mcg at 10/25/22 0550    magnesium sulfate 1000 mg in dextrose 5% 100 mL IVPB, 1,000 mg, IntraVENous, PRN, Mabelene Sandifer, DO    sodium phosphate 13.05 mmol in sodium chloride 0.9 % 250 mL IVPB, 0.16 mmol/kg, IntraVENous, PRN **OR** sodium phosphate 26.1 mmol in sodium chloride 0.9 % 500 mL IVPB, 0.32 mmol/kg, IntraVENous, PRN, Mabelene Sandifer, DO    potassium chloride (KLOR-CON M) extended release tablet 40 mEq, 40 mEq, Oral, PRN **OR** potassium bicarb-citric acid (EFFER-K) effervescent tablet 40 mEq, 40 mEq, Oral, PRN, 40 mEq at 10/22/22 0827 **OR** potassium chloride 10 mEq/100 mL IVPB (Peripheral Line), 10 mEq, IntraVENous, PRN, Willim Slider, DO    Arformoterol Tartrate (BROVANA) nebulizer solution 15 mcg, 15 mcg, Nebulization, BID, Willim Slider, DO, 15 mcg at 10/25/22 0550    sodium chloride flush 0.9 % injection 5-40 mL, 5-40 mL, IntraVENous, 2 times per day, Willim Slider, DO, 10 mL at 10/23/22 2140    sodium chloride flush 0.9 % injection 5-40 mL, 5-40 mL, IntraVENous, PRN, Willim Slider, DO    0.9 % sodium chloride infusion, , IntraVENous, PRN, Willim Slider, DO    ondansetron (ZOFRAN-ODT) disintegrating tablet 4 mg, 4 mg, Oral, Q8H PRN **OR** ondansetron (ZOFRAN) injection 4 mg, 4 mg, IntraVENous, Q6H PRN, Willim Slider, DO, 4 mg at 10/23/22 2141    acetaminophen (TYLENOL) tablet 650 mg, 650 mg, Oral, Q6H PRN, 650 mg at 10/24/22 1521 **OR** acetaminophen (TYLENOL) suppository 650 mg, 650 mg, Rectal, Q6H PRN, Willim Slider, DO    sennosides-docusate sodium (SENOKOT-S) 8.6-50 MG tablet 2 tablet, 2 tablet, Oral, BID, Willim Slider, DO, 2 tablet at 10/23/22 2141    polyethylene glycol (GLYCOLAX) packet 17 g, 17 g, Oral, BID, Willim Slider, DO, 17 g at 10/23/22 2140    bisacodyl (DULCOLAX) EC tablet 5 mg, 5 mg, Oral, Daily PRN, Willim Slider, DO    lactulose (CHRONULAC) 10 GM/15ML solution 20 g, 20 g, Oral, BID, Willim Slider, DO, 20 g at 10/23/22 2141    ipratropium-albuterol (DUONEB) nebulizer solution 1 ampule, 1 ampule, Inhalation, Q4H WA, Aricm Slider, DO, 1 ampule at 10/25/22 0959    nicotine (NICODERM CQ) 21 MG/24HR 1 patch, 1 patch, TransDERmal, Daily, Cari Bunn, DO, 1 patch at 10/25/22 1142  PHYSICAL EXAM     BP (!) 140/116   Pulse (!) 114   Temp 97.5 °F (36.4 °C) (Axillary)   Resp 17   Ht 5' 6\" (1.676 m)   Wt 205 lb (93 kg)   SpO2 100%   BMI 33.09 kg/m²   Temp  Av.7 °F (36.5 °C)  Min: 97.5 °F (36.4 °C)  Max: 97.8 °F (36.6 °C)   CONSTITUTIONAL: awake, alert, cooperative, no apparent distress, and appears stated age  ENT:  Normocephalic, without obvious abnormality, atraumatic, sinuses   LUNGS:  No increased work of breathing, good air exchange, clear to auscultation bilaterally, no crackles or wheezing  CARDIOVASCULAR:  Normal apical impulse, regular rate and rhythm, normal S1 and S2,   ABDOMEN:  No scars, normal bowel sounds, soft, non-distended, non-tender, no masses palpated, no hepatosplenomegally  MUSCULOSKELETAL:  There is no redness, warmth, or swelling of the joints. Full range of motion noted. edema  NEUROLOGIC:  Awake, alert, oriented to name, place and time. Cranial nerves II-XII are grossly intact. SKIN:  normal skin color, texture, turgor and no rashes       Peripheral Intravenous Line:  Peripheral IV 10/23/22 Left Forearm (Active)   Site Assessment Clean, dry & intact 10/24/22 2000   Line Status Infusing 10/24/22 2000   Line Care Connections checked and tightened 10/24/22 2000   Phlebitis Assessment No symptoms 10/24/22 2000   Infiltration Assessment 0 10/24/22 2000   Alcohol Cap Used Yes 10/24/22 2000   Dressing Status Clean, dry & intact 10/24/22 2000   Dressing Type Transparent 10/24/22 2000   Dressing Intervention Other (Comment) 10/24/22 2000       Peripheral IV 10/24/22 Right Wrist (Active)   Site Assessment Clean, dry & intact 10/24/22 2000   Line Status Blood return noted; Flushed; Infusing 10/24/22 2000   Line Care Connections checked and tightened 10/24/22 2000   Phlebitis Assessment No symptoms 10/24/22 2000   Infiltration Assessment 0 10/24/22 2000   Alcohol Cap Used Yes 10/24/22 2000   Dressing Status Clean, dry & intact 10/24/22 2000   Dressing Type Transparent 10/24/22 2000   Dressing Intervention Other (Comment) 10/24/22 2000       Peripheral IV 10/25/22 Left; Anterior Cephalic (Active)   Site Assessment Clean, dry & intact 10/25/22 1248   Line Status Blood return noted;Brisk blood return;Flushed;Capped;Normal saline locked 10/25/22 1248   Phlebitis Assessment No symptoms 10/25/22 1248   Infiltration Assessment 0 10/25/22 1248   Alcohol Cap Used Yes 10/25/22 1248   Dressing Status New dressing applied;Clean, dry & intact 10/25/22 1248   Dressing Type Transparent 10/25/22 1248   Dressing Intervention New 10/25/22 1248       Drain(s):  Urinary Catheter 10/23/22 Vargas (Active)   $ Urethral catheter insertion $ Not inserted for procedure 10/23/22 1030   Catheter Indications Urinary retention (acute or chronic), continuous bladder irrigation or bladder outlet obstruction; Need for fluid volume management of the critically ill patient in a critical care setting 10/24/22 2000   Site Assessment No urethral drainage 10/24/22 2000   Urine Color Sally 10/24/22 2000   Urine Appearance Clear 10/24/22 2000   Collection Container Standard 10/24/22 2000   Securement Method Leg strap 10/24/22 2000   Catheter Care Completed Yes 10/24/22 2000   Catheter Best Practices  Drainage tube clipped to bed;Catheter secured to thigh; Bag below bladder;Bag not on floor; Lack of dependent loop in tubing;Drainage bag less than half full 10/24/22 2000   Status Draining 10/24/22 2000   Output (mL) 450 mL 10/25/22 0625        DIAGNOSTIC RESULTS   Radiology:    Recent Labs     10/23/22  0500 10/24/22  0544 10/25/22  0340   WBC 9.2 9.7 9.7   RBC 4.68 4.58 4.51   HGB 12.3 12.3 11.8   HCT 40.2 39.4 38.3   MCV 85.9 86.0 84.9   MCH 26.3 26.9 26.2   MCHC 30.6* 31.2* 30.8*   RDW 17.2* 18.0* 17.9*    310 271   MPV 11.2 11.3 11.2     Recent Labs     10/23/22  0500 10/23/22  1608 10/24/22  0544 10/24/22  1422 10/24/22  2306 10/25/22  0340 10/25/22  1009      < > 142   < > 140 139 140   K 4.8   < > 4.7   < > 4.3 4.4 4.6      < > 104   < > 103 102 101   CO2 17*   < > 21*   < > 22 22 27   BUN 74*   < > 91*   < > 90* 88* 83*   CREATININE 2.0*   < > 2.5*   < > 2.5* 2.4* 2.3*   GLUCOSE 144*   < > 133*   < > 114* 112* 108*   PROT 6.1*  --  5.8*  --   --  5.5*  --

## 2022-10-25 NOTE — PROGRESS NOTES
[facility]) Physicians        CARDIOLOGY                 INPATIENT PROGRESS NOTE          PATIENT SEEN IN FOLLOW UP FOR: A Fib    Hospital Day: 5     Patient previously not known to [facility] cardiology. Follows with Dr Katia Gunderson     76year old with history of PAF, HTN, NHL presented to [facility]'s emergency department for the evaluation of intractable lower quadrant abdominal pain. Cardiology consulted for A Fib      SUBJECTIVE: Denies any CP or SOB, No nausea - \"I want to go home\"    ROS: Review of rest of 10 systems negative except as mentioned above    OBJECTIVE: No acute distress. See Assessment     Diagnostics:       Telemetry: Reviewed        Intake/Output Summary (Last 24 hours) at 10/25/2022 1532  Last data filed at 10/25/2022 1420  Gross per 24 hour   Intake --   Output 1700 ml   Net -1700 ml       Labs:   CBC:   Recent Labs     10/24/22  0544 10/25/22  0340   WBC 9.7 9.7   HGB 12.3 11.8   HCT 39.4 38.3    271     BMP:   Recent Labs     10/25/22  0340 10/25/22  1009    140   K 4.4 4.6   CO2 22 27   BUN 88* 83*   CREATININE 2.4* 2.3*   LABGLOM 20 21   CALCIUM 8.4* 8.7     Mag:   Recent Labs     10/24/22  0544 10/25/22  0340   MG 2.6 2.6     Phos:   Recent Labs     10/24/22  0544 10/25/22  0340   PHOS 5.3* 4.5     TSH: No results for input(s): TSH in the last 72 hours. HgA1c:     BNP: No results for input(s): BNP in the last 72 hours. PT/INR: No results for input(s): PROTIME, INR in the last 72 hours. APTT:No results for input(s): APTT in the last 72 hours.   CARDIAC ENZYMES:  Recent Labs     10/23/22  1013 10/23/22  1124 10/24/22  1422   CKTOTAL  --   --  337*   TROPHS 37* 38*  --      FASTING LIPID PANEL:  Lab Results   Component Value Date/Time    CHOL 140 10/22/2022 05:09 AM    HDL 21 10/22/2022 05:09 AM    LDLCALC 102 10/22/2022 05:09 AM    TRIG 83 10/22/2022 05:09 AM     LIVER PROFILE:  Recent Labs     10/24/22  0544 10/25/22  0340   AST 88* 111*   ALT 65* 83*   LABALBU 3. 4* 3.3*       Current Inpatient Medications:   metoprolol succinate  50 mg Oral Daily    pantoprazole  40 mg Oral BID AC    piperacillin-tazobactam  3,375 mg IntraVENous Q12H    sodium chloride flush  5-40 mL IntraVENous 2 times per day    heparin flush  1 mL IntraVENous 2 times per day    apixaban  5 mg Oral Daily    vitamin B-12  1,000 mcg Oral Daily    budesonide  0.5 mg Nebulization BID    Arformoterol Tartrate  15 mcg Nebulization BID    sodium chloride flush  5-40 mL IntraVENous 2 times per day    sennosides-docusate sodium  2 tablet Oral BID    polyethylene glycol  17 g Oral BID    lactulose  20 g Oral BID    ipratropium-albuterol  1 ampule Inhalation Q4H WA    nicotine  1 patch TransDERmal Daily       IV Infusions (if any):   dextrose 5 % and 0.45 % NaCl 100 mL/hr at 10/25/22 1404    sodium chloride      sodium chloride           PHYSICAL EXAM:     CONSTITUTIONAL:   BP (!) 140/116   Pulse (!) 114   Temp 97.5 °F (36.4 °C) (Axillary)   Resp 17   Ht 5' 6\" (1.676 m)   Wt 205 lb (93 kg)   SpO2 100%   BMI 33.09 kg/m²   Pulse  Av.4  Min: 99  Max: 653  Systolic (67ZJO), OIT:858 , Min:103 , BNZ:835    Diastolic (65PLI), EZF:76, Min:65, Max:116      CONST:  Well developed, appears stated age. Awake, alert and no apparent distress. HEENT:   Head- Normocephalic  Eyes- Conjunctivae pink, no icterus  Throat- Oral mucosa moist  Neck-  No stridor, no jugular venous distention. No carotid bruit. CHEST: Chest symmetrical and non-tender to palpation. No accessory muscle use  RESPIRATORY: Lung sounds - Few rhonchi  CARDIOVASCULAR:     Heart Inspection-OK  Heart Palpation- No thrills. Heart Ausculation- Irregularly irregular rate and rhythm, 2/6 systolic murmur. No s3 or rub   EXT: Trace lower extremity edema. Distal pulses palpable, no cyanosis   ABDOMEN: Obese, Bowel sounds present.  No abdominal bruit  MS:n/a  : Deferred  RECTAL: Deferred  SKIN: Warm and dry   NEURO / PSYCH: Oriented to person, place; Good mood and affect. IMPRESSION / RECOMMENDATIONS:        Preop cardiac clearance - No CP. High risk for any surgical procedures due to severe LV dysfunction. Paroxysmal A fib with RVR  - Increase PO Metoprolol and Eliquis for [address]. Acute proctocolitis ([address] -Surgery/GI on case     Borderline Elevated troponin - flat pattern, Likely from CKD, Tachycardia - no CP     Chronic HF - now with reduced EF - EF about 15% on Echo - Add NTG+Hydralazine - no ARNI, ACEi, ARB/Spironolactone due to CKD. Benefits of Life Vest upon discharge discussed  - Declined \" I am 77\", don't want any. Aware of risks. Defer to her Primary Cardiologist for further ischemia as Out-Pt. HTN  - Monitor BP     Hx of NHL             Above recommendations discussed with her. She will f/u with Dr Rey Miguel after discharge.       Electronically signed by Kailyn Selby MD on 10/25/2022 at 3:32 PM  [facility]) Cardiology

## 2022-10-25 NOTE — SIGNIFICANT EVENT
Preliminary echocardiogram is indicating severe cardiomyopathy with no previous echocardiogram for comparison. EF is listed between 10 and 15%. We have asked the cardiovascular team to provide consultation. Goal-directed therapy cannot be achieved at this point in the setting of acute renal insufficiency. Cardiac medications will need maximized as renal function improves. I do not believe the patient would agree to any aggressive intervention and would likely defer ischemic evaluation as well. This will be further discussed with her later.     Tamara Tristan,   1:16 PM  10/25/2022

## 2022-10-25 NOTE — PLAN OF CARE
Problem: Pain  Goal: Verbalizes/displays adequate comfort level or baseline comfort level  Outcome: Progressing     Problem: Safety - Adult  Goal: Free from fall injury  Outcome: Progressing     Problem: ABCDS Injury Assessment  Goal: Absence of physical injury  Outcome: Progressing     Problem: Discharge Planning  Goal: Discharge to home or other facility with appropriate resources  Outcome: Progressing

## 2022-10-25 NOTE — PROGRESS NOTES
Physical Therapy        0515/0515-02    Patient unavailable for physical therapy treatment due to patient declines therapy at this time as she just returned from xray and waiting on breakfast tray. Will attempt session at later time/date.      Jorge Cade, PTA  #[phone]

## 2022-10-25 NOTE — CARE COORDINATION
SS NOTE: Pt has a telesitter due to impulsive getting up out of bed. Pt is on room air. Pt to have an ECHO. GI is on and she is to have an EGD. Per nursing pt has worsening renal function. She has a Midline and is on IV Vanc and Protonix. PT / OT are on- 2 x 20 feet using  wheeled walker with Minimal assist.. Per 4th floor SW pt was set to return home on Sunday after conversation with pt's dtr and granddtr regarding SNF vs HHC. They insist that pt return home. A HHC referral started with [facility] for start of care Thursday (10/27)- [facility] orders are needed to complete that referral. SS to continue. Alyson Marsh. 10/25/2022.12:23 PM.

## 2022-10-25 NOTE — PROGRESS NOTES
Pharmacy Consultation Note  (Antibiotic Dosing and Monitoring)    Initial consult date: 10/24/2022  Consulting physician/provider: Dr. Mackenzie Julio  Drug: Vancomycin  Indication: Bloodstream Infection    Age/  Gender Height Weight IBW  Allergy Information   77 y.o./female 5' 6\" (167.6 cm) 180 lb (81.6 kg)     Ideal body weight: 59.3 kg (130 lb 11.7 oz)  Adjusted ideal body weight: 72.8 kg (160 lb 7 oz)   Bactrim [sulfamethoxazole-trimethoprim], Ibuprofen, and Codeine      Renal Function:  Recent Labs     10/24/22  1422 10/24/22  2306 10/25/22  0340   BUN 90* 90* 88*   CREATININE 2.4* 2.5* 2.4*         Intake/Output Summary (Last 24 hours) at 10/25/2022 0931  Last data filed at 10/25/2022 3485  Gross per 24 hour   Intake 0 ml   Output 900 ml   Net -900 ml         Vancomycin Monitoring:  Trough:  No results for input(s): VANCOTROUGH in the last 72 hours. Random:    Recent Labs     10/25/22  0340   VANCORANDOM 11.5       No results for input(s): Lelan Salts in the last 72 hours. Historical Cultures:  Organism   Date Value Ref Range Status   10/21/2022 Staphylococcus species (A)  Preliminary     No results for input(s): BC in the last 72 hours. Recent vancomycin administrations                     vancomycin (VANCOCIN) 1,500 mg in dextrose 5 % 300 mL IVPB (mg) 1,500 mg New Bag 10/24/22 4565             Assessment:  Patient is a 76 y.o. female who has been initiated on vancomycin for \"bloodstream infection\". 1/2 blood cultures from 10/21 resulted with GPC in clusters. Repeat blood cultures sent. Estimated Creatinine Clearance: 22 mL/min (A) (based on SCr of 2.4 mg/dL (H)). Baseline SCr ~ 1 mg/dL.   To dose vancomycin, pharmacy will be utilizing dosing based off of levels because of patient's renal impairment/insufficiency  Random AM level today = 11.5 mcg/mL    Plan:  Re-dose vancomycin 1250 mg IV x 1 today  Random level tomorrow with AM labs  Will continue to monitor renal function   Pharmacy to follow    [employer] Walter Woodson PharmD, BCPS 10/25/2022 9:31 AM   Ext: [phone]

## 2022-10-25 NOTE — PLAN OF CARE
Problem: Pain  Goal: Verbalizes/displays adequate comfort level or baseline comfort level  Outcome: Progressing     Problem: Safety - Adult  Goal: Free from fall injury  Outcome: Progressing     Problem: ABCDS Injury Assessment  Goal: Absence of physical injury  Outcome: Progressing     Problem: Discharge Planning  Goal: Discharge to home or other facility with appropriate resources  Outcome: Progressing  Flowsheets (Taken 10/25/2022 1050)  Discharge to home or other facility with appropriate resources: Identify barriers to discharge with patient and caregiver     Problem: Skin/Tissue Integrity  Goal: Absence of new skin breakdown  Description: 1. Monitor for areas of redness and/or skin breakdown  2. Assess vascular access sites hourly  3. Every 4-6 hours minimum:  Change oxygen saturation probe site  4. Every 4-6 hours:  If on nasal continuous positive airway pressure, respiratory therapy assess nares and determine need for appliance change or resting period.   Outcome: Progressing

## 2022-10-26 NOTE — HOME CARE
HOME CARE ORDERS NEED ADDED IN [facility] FOR PATIENT [facility] UPON DISCHARGE.      Saritha Dowling LPN   [facility] AT [facility]

## 2022-10-26 NOTE — PROGRESS NOTES
[facility]) Physicians        CARDIOLOGY                 INPATIENT PROGRESS NOTE          PATIENT SEEN IN FOLLOW UP FOR: A Fib    Hospital Day: 6     Patient previously not known to [facility] cardiology. Follows with Dr Preeti Leavitt     76year old with history of PAF, HTN, NHL presented to [facility]'s emergency department for the evaluation of intractable lower quadrant abdominal pain. Cardiology consulted for A Fib      SUBJECTIVE: Denies any CP or SOB, No nausea - \"I want to go home\"    ROS: Review of rest of 10 systems negative except as mentioned above    OBJECTIVE: No acute distress. See Assessment     Diagnostics:       Telemetry: Reviewed    Echo - Report pending        Intake/Output Summary (Last 24 hours) at 10/26/2022 1745  Last data filed at 10/26/2022 1405  Gross per 24 hour   Intake 617.47 ml   Output 800 ml   Net -182.53 ml       Labs:   CBC:   Recent Labs     10/25/22  0340 10/26/22  0321   WBC 9.7 9.3   HGB 11.8 11.4*   HCT 38.3 36.8    220     BMP:   Recent Labs     10/26/22  0321 10/26/22  1021    136   K 4.2 4.4   CO2 23 22   BUN 79* 75*   CREATININE 2.0* 2.0*   LABGLOM 25 25   CALCIUM 8.2* 8.3*     Mag:   Recent Labs     10/25/22  0340 10/26/22  0321   MG 2.6 2.5     Phos:   Recent Labs     10/25/22  0340 10/26/22  0321   PHOS 4.5 3.8     TSH: No results for input(s): TSH in the last 72 hours. HgA1c:     BNP: No results for input(s): BNP in the last 72 hours. PT/INR: No results for input(s): PROTIME, INR in the last 72 hours. APTT:No results for input(s): APTT in the last 72 hours.   CARDIAC ENZYMES:  Recent Labs     10/24/22  1422   CKTOTAL 337*     FASTING LIPID PANEL:  Lab Results   Component Value Date/Time    CHOL 140 10/22/2022 05:09 AM    HDL 21 10/22/2022 05:09 AM    LDLCALC 102 10/22/2022 05:09 AM    TRIG 83 10/22/2022 05:09 AM     LIVER PROFILE:  Recent Labs     10/25/22  0340 10/26/22  0321   * 156*   ALT 83* 123*   LABALBU 3.3* 3.1*       Current Inpatient Medications:   amoxicillin-clavulanate  1 tablet Oral 2 times per day    pantoprazole  40 mg Oral BID AC    metoprolol succinate  50 mg Oral BID    isosorbide mononitrate  30 mg Oral Daily    hydrALAZINE  10 mg Oral 3 times per day    sodium chloride flush  5-40 mL IntraVENous 2 times per day    heparin flush  1 mL IntraVENous 2 times per day    apixaban  5 mg Oral Daily    vitamin B-12  1,000 mcg Oral Daily    budesonide  0.5 mg Nebulization BID    Arformoterol Tartrate  15 mcg Nebulization BID    sodium chloride flush  5-40 mL IntraVENous 2 times per day    sennosides-docusate sodium  2 tablet Oral BID    polyethylene glycol  17 g Oral BID    lactulose  20 g Oral BID    ipratropium-albuterol  1 ampule Inhalation Q4H WA    nicotine  1 patch TransDERmal Daily       IV Infusions (if any):   dextrose 5 % and 0.45 % NaCl 100 mL/hr at 10/26/22 1506    sodium chloride 10 mL/hr at 10/26/22 1500    sodium chloride           PHYSICAL EXAM:     CONSTITUTIONAL:   /71   Pulse 90   Temp (!) 96.2 °F (35.7 °C) (Axillary)   Resp 19   Ht 5' 6\" (1.676 m)   Wt 205 lb 1.6 oz (93 kg)   SpO2 100%   BMI 33.10 kg/m²   Pulse  Av  Min: 90  Max: 379  Systolic (66EZT), YOK:613 , Min:89 , WZY:800    Diastolic (06HZX), JHH:06, Min:64, Max:78      CONST:  Well developed, appears stated age. Awake, alert and no apparent distress. HEENT:   Head- Normocephalic  Eyes- Conjunctivae pink, no icterus  Throat- Oral mucosa moist  Neck-  No stridor, no jugular venous distention. No carotid bruit. CHEST: Chest symmetrical and non-tender to palpation. No accessory muscle use  RESPIRATORY: Lung sounds - Few rhonchi  CARDIOVASCULAR:     Heart Inspection-OK  Heart Palpation- No thrills. Heart Ausculation- Irregularly irregular rate and rhythm, 2/6 systolic murmur. No s3 or rub   EXT: Trace lower extremity edema. Distal pulses palpable, no cyanosis   ABDOMEN: Obese, Bowel sounds present.  No abdominal bruit  MS:n/a  : Deferred  RECTAL: Deferred  SKIN: Warm and dry   NEURO / PSYCH: Oriented to person, place; Good mood and affect. IMPRESSION / RECOMMENDATIONS:        Preop cardiac clearance - No CP. High risk for any surgical procedures due to severe LV dysfunction. Paroxysmal A fib with RVR  - Rate control with Metoprolol and Eliquis for [address]. Acute proctocolitis ([address] - Surgery/GI on case     Borderline Elevated troponin - Flat pattern, Likely from CKD, Tachycardia - no CP     Chronic HF - Now with reduced EF - Preliminary findings - EF about 15%, Continue BB, NTG+Hydralazine - No ARNI, ACEi, ARB/Spironolactone due to CKD. Benefits of Life Vest upon discharge discussed  - Declined Vest. \" I am 77\", don't want any. Aware of risks. Defer to her Primary Cardiologist for further ischemia as Out-Pt. HTN  - Monitor BP     Hx of NHL                 d/w Dr Andrew Urias    Cardiology will follow as needed    No family at bed side  Above recommendations discussed with her. She will f/u with Dr Ora Blue after discharge.       Electronically signed by Jerry Santos MD on 10/26/2022   [facility]) Cardiology

## 2022-10-26 NOTE — CARE COORDINATION
SS NOTE: Pt has a telesitter due to impulsive getting up out of bed. Pt is on room air. Pt had an ECHO with and EF of 10%. She has a Midline and is on IV Vanc and Protonix. PT / OT are on- yesterday ambulation was not assessed and it took max assist of 2 to transfer her. SW left a detailed voice messages to see if pt's caregiver, granddtr Otto Sawyer and/ or dtr Patricia Asencio would like to come in and view a PT session so as to be prepared for pt's possible homecoming this week. Pt's dtr Patricia Asencio has been against SNF for pt prior to this SW involvement. A [facility] referral started with [facility] for start of care Thursday (10/27)- [facility] orders are in the record today to complete that referral- however Dr Ila Pennington continue orders for pt after dch with [facility] until she goes to see him in his office. SS to continue. DANIELA Shane.10/26/2022.11:50AM.     ADDENDUM TO ABOVE NOTE: Pt's dtr and granddter will be here at 1:00PM today. They will view a therapy session and make SNF choices. DANIELA Shane.10/26/2022.12:10 PM,

## 2022-10-26 NOTE — PROGRESS NOTES
PROGRESS NOTE  By Maria De Jesus Kinney M.D. [facility]  Dr. Chelita Santiago M.D.,  Dr. Traci Rodriguez M.D.,   NORIS Ruby.O.,  Dr. Moshe Sterling M.D.,  Dr. Tabitha Campo D.O.,          Lee Ann Dawn  76 y.o.  female    SUBJECTIVE:  Denies abdominal pain. Denies nausea vomiting. Patient states that she is going home.     OBJECTIVE:    /71   Pulse 90   Temp (!) 96.2 °F (35.7 °C) (Axillary)   Resp 19   Ht 5' 6\" (1.676 m)   Wt 205 lb 1.6 oz (93 kg)   SpO2 100%   BMI 33.10 kg/m²     General: NAD/elderly  female  HEENT: Anicteric sclera/moist oral mucosa  Neck: Supple with trachea midline  Chest: Symmetric excursion/labored respirations  Cor: Appears regular  Abd.: Soft and appears nontender  Extr.:  Decreased muscle tone and bulk throughout  Skin: Warm and dry      DATA:    Monitor data reviewed -leads off    Stool (measured) : 0 mL  Lab Results   Component Value Date/Time    WBC 9.3 10/26/2022 03:21 AM    RBC 4.26 10/26/2022 03:21 AM    HGB 11.4 10/26/2022 03:21 AM    HCT 36.8 10/26/2022 03:21 AM    MCV 86.4 10/26/2022 03:21 AM    MCH 26.8 10/26/2022 03:21 AM    MCHC 31.0 10/26/2022 03:21 AM    RDW 18.0 10/26/2022 03:21 AM     10/26/2022 03:21 AM    MPV 11.2 10/26/2022 03:21 AM     Lab Results   Component Value Date/Time     10/26/2022 10:21 AM    K 4.4 10/26/2022 10:21 AM    K 3.3 07/06/2022 08:30 AM     10/26/2022 10:21 AM    CO2 22 10/26/2022 10:21 AM    BUN 75 10/26/2022 10:21 AM    CREATININE 2.0 10/26/2022 10:21 AM    CALCIUM 8.3 10/26/2022 10:21 AM    PROT 5.2 10/26/2022 03:21 AM    LABALBU 3.1 10/26/2022 03:21 AM    BILITOT 0.7 10/26/2022 03:21 AM    ALKPHOS 86 10/26/2022 03:21 AM     10/26/2022 03:21 AM     10/26/2022 03:21 AM     Lab Results   Component Value Date/Time    LIPASE 19 10/25/2022 03:40 AM     No results found for: AMYLASE      ASSESSMENT/PLAN:  Patient Active Problem List   Diagnosis    Generalized abdominal pain Proctocolitis     1. Abnormal CT appearance of the rectum  -Appears constipated  -Further evaluation with nonemergent, possibly outpatient colonoscopy     2. Abnormal MRI consistent with duodenitis  -Plan for further evaluation with direct visualization  -See discussion below        3. Constipation  -Bowel regimen    Patient has refused endoscopy yesterday. At this time she states that she is going home. Patient will be at increased risk for sedation/endoscopy given severe cardiomyopathy/advanced age and comorbidities  Given nonemergent nature of procedure as well as patient refusal, will hold off endoscopy at this time  No immediate plans for intervention from GI POV - will see as needed in the hospital -please, call with questions/concerns. Follow-up in the office in 2 weeks after discharge -family/facility to call for appointment and with questions/concerns at [phone]. Thank you for the opportunity to participate in the care of  Ava Browning MD  10/26/2022  11:59 AM    NOTE:  This report was transcribed using voice recognition software. Every effort was made to ensure accuracy; however, inadvertent computerized transcription errors may be present.

## 2022-10-26 NOTE — PLAN OF CARE
Problem: Pain  Goal: Verbalizes/displays adequate comfort level or baseline comfort level  10/26/2022 0204 by Yulia Kevin RN  Outcome: Progressing  10/25/2022 1950 by Yanet Cueva RN  Outcome: Progressing     Problem: Safety - Adult  Goal: Free from fall injury  10/26/2022 0204 by Yulia Kevin RN  Outcome: Progressing  Flowsheets (Taken 10/25/2022 2000)  Free From Fall Injury:   Instruct family/caregiver on patient safety   Based on caregiver fall risk screen, instruct family/caregiver to ask for assistance with transferring infant if caregiver noted to have fall risk factors  10/25/2022 1950 by Yanet Cueva RN  Outcome: Progressing     Problem: ABCDS Injury Assessment  Goal: Absence of physical injury  10/26/2022 0204 by Yulia Kevin RN  Outcome: Progressing  Flowsheets (Taken 10/25/2022 2000)  Absence of Physical Injury: Implement safety measures based on patient assessment  10/25/2022 1950 by Yanet Cueva RN  Outcome: Progressing     Problem: Discharge Planning  Goal: Discharge to home or other facility with appropriate resources  10/26/2022 0204 by Yulia Kevin RN  Outcome: Progressing  Flowsheets (Taken 10/25/2022 2000)  Discharge to home or other facility with appropriate resources:   Identify barriers to discharge with patient and caregiver   Arrange for needed discharge resources and transportation as appropriate   Identify discharge learning needs (meds, wound care, etc)  10/25/2022 1950 by Yanet Cueva RN  Outcome: Progressing  Flowsheets (Taken 10/25/2022 1059)  Discharge to home or other facility with appropriate resources: Identify barriers to discharge with patient and caregiver     Problem: Skin/Tissue Integrity  Goal: Absence of new skin breakdown  Description: 1. Monitor for areas of redness and/or skin breakdown  2. Assess vascular access sites hourly  3. Every 4-6 hours minimum:  Change oxygen saturation probe site  4.   Every 4-6 hours:  If on nasal continuous positive airway pressure, respiratory therapy assess nares and determine need for appliance change or resting period.   10/26/2022 0204 by Yulia Kevin RN  Outcome: Progressing  10/25/2022 1950 by Yanet Cueva RN  Outcome: Progressing

## 2022-10-26 NOTE — PROGRESS NOTES
Physical Therapy Treatment Note/Plan of Care    Room #:  4895/9668-73  Patient Name: Juan Grace  YOB: 1945  MRN: [id number]    Date of Service: 10/26/2022     Tentative placement recommendation: Home Health Physical Therapy   Equipment recommendation: To be determined      Evaluating Physical Therapist: Kleber Ramírez, [facility] #[id number]      Specific Provider Orders/Date/Referring Provider :   10/21/22 0600    PT eval and treat  Start:  10/21/22 0600,   End:  10/21/22 0600,   ONE TIME,   Standing Count:  1 Occurrences,   R         Alex Pastel, DO     Admitting Diagnosis:   Proctocolitis [K52.9]  Diverticulosis [K57.90]  Acute kidney injury ([facility] [address] [N17.9]  Idiopathic proctocolitis, other complication ([facility] [address] [U89.867]      Surgery: none  Visit Diagnoses         Codes    Proctocolitis    -  Primary K52.9    Diverticulosis     K57.90    Acute kidney injury ([facility] [address]     N17.9            Patient Active Problem List   Diagnosis    Generalized abdominal pain    Proctocolitis        ASSESSMENT of Current Deficits Patient exhibits decreased strength, balance, and endurance impairing functional mobility, transfers, gait distance, and tolerance to activity. Patient continues to be self limiting and only agreeable to minimal activity. Patient having frequent loose BM with very red and irritated bottom. Patient needing min assist with rolling in bed and changing gown. Patient required max encouragement to participate with therapy. PTA encouraged patient to try ambulation in a later session today but she seemed very doubtful she would be able to or even want to do that. Education provided on importance of mobility while in hospital.The patient will benefit from continued skilled therapy to increase strength and improve balance for safe functional mobility, to decrease risk of falls, and to meet goals at discharge.         PHYSICAL THERAPY  PLAN OF CARE       Physical therapy plan of care is established based on physician order,  patient diagnosis and clinical assessment    Current Treatment Recommendations:    -Bed Mobility: Lower extremity exercises   -Sitting Balance: Incorporate reaching activities to activate trunk muscles   -Standing Balance: Perform strengthening exercises in standing to promote motor control with or without upper extremity support   -Transfers: Provide instruction on proper hand and foot position for adequate transfer of weight onto lower extremities and use of gait device if needed and Cues for hand placement, technique and safety. Provide stabilization to prevent fall   -Gait: Gait training and Standing activities to improve: base of support, weight shift, weight bearing    -Endurance: Utilize Supervised activities to increase level of endurance to allow for safe functional mobility including transfers and gait   -Stairs: Stair training with instruction on proper technique and hand placement on rail    PT long term treatment goals are located in below grid    Patient and or family understand(s) diagnosis, prognosis, and plan of care. Frequency of treatments: Patient will be seen  daily.          Prior Level of Function: Patient ambulated with cane   Rehab Potential: good  for baseline    Past medical history:   Past Medical History:   Diagnosis Date    Hypertension     Lymphoma ([facility] [address]     remission since 2015     Past Surgical History:   Procedure Laterality Date    APPENDECTOMY      CHOLECYSTECTOMY, OPEN      HYSTERECTOMY (CERVIX STATUS UNKNOWN)      INCISIONAL HERNIA REPAIR         SUBJECTIVE:    Precautions:  falls , impulsive, poor safety awareness    Social history: Patient lives with daughter in a two story home resides first  with 3 steps  to enter with Rail  Tub shower grab bars    Equipment owned: Wheelchair, [place], and Wheeled Walker,      [address] [place] [address],[address] Mobility Inpatient   How much difficulty turning over in bed?: A Little  How much difficulty sitting down on / standing up from a chair with arms?: A Lot  How much difficulty moving from lying on back to sitting on side of bed?: A Little  How much help from another person moving to and from a bed to a chair?: A Little  How much help from another person needed to walk in hospital room?: A Lot  How much help from another person for climbing 3-5 steps with a railing?: A Lot  AM-PAC Inpatient Mobility Raw Score : 15  AM-PAC Inpatient T-Scale Score : 39.45  Mobility Inpatient CMS 0-100% Score: 57.7  Mobility Inpatient CMS G-Code Modifier : CK    Nursing cleared patient for PT treatment. Patient needing cleaned up. OBJECTIVE;   Initial Evaluation  Date: 10/21/2022 Treatment Date:  10/26/2022       Short Term/ Long Term   Goals   Was pt agreeable to Eval/treatment? Yes yes To be met in 4 days   Pain level   4/10  Abdominal pain Pain in butt (redness)     Bed Mobility    Rolling: Minimal assist of 1    Supine to sit: Minimal assist of 1    Sit to supine: Minimal assist of 1    Scooting: Minimal assist of 1   Rolling: Minimal assist of 1   Supine to sit: Not assessed    Sit to supine: Not assessed    Scooting: Not assessed     Rolling: Independent    Supine to sit:  Independent    Sit to supine: Independent    Scooting: Independent     Transfers Sit to stand: Minimal assist of 1 from chair and EOB Sit to stand: Not assessed       Sit to stand: Independent    Ambulation     2x15 feet using  wheeled walker with Minimal assist of 1   for walker control, balance, upright, and safety, IV pole management  not assessed      50 feet using  least restrictive device with Modified Independent    Stair negotiation: ascended and descended   Not assessed     3 steps with HR   ROM Within functional limits    Increase range of motion 10% of affected joints    Strength BUE:  refer to OT eval  RLE:  4-/5  LLE:  4-/5  Increase strength in affected mm groups by 1/3 grade   Balance Sitting EOB:  good -  Dynamic Standing:  fair + wheeled walker   Sitting EOB: not assessed   Dynamic Standing: not assessed     Sitting EOB:  good   Dynamic Standing: good      Patient is Alert & Oriented x person, place, time, and situation and follows directions    Sensation:  Patient  denies numbness/tingling   Edema:  no   Endurance: fair      Vitals:  2 liters nasal cannula   Blood Pressure at rest  Blood Pressure during session    Heart Rate at rest  Heart Rate during session    SPO2 at rest %  SPO2 during session 91-95%     Patient education  Patient educated on role of Physical Therapy, risks of immobility, safety and plan of care, energy conservation,  importance of mobility while in hospital , safety , and positioning for skin integrity and comfort     Patient response to education:   Pt verbalized understanding Pt demonstrated skill Pt requires further education in this area   Yes Partial Yes      Treatment:  Patient practiced and was instructed/facilitated in the following treatment: Patient assisted with rolling in bed for hygiene due to BM. Patient rolled multiple times and assisted with changing gown. Patient requested to remain sidelying due to pain in bottom. Patient refused all other activity. Therapeutic Exercises:  not performed reps. At end of session, patient in bed with alarm and nursing present call light and phone within reach,  all lines and tubes intact, nursing notified. Patient would benefit from continued skilled Physical Therapy to improve functional independence and quality of life.          Patient's/ family goals   home    Time in  830  Time out 857    Total Treatment Time  27 minutes      CPT codes:  Therapeutic activities (06133)   27 minutes  2 unit(s)    Aleah Blair, [facility]  #[id number]

## 2022-10-26 NOTE — PROGRESS NOTES
Internal Medicine Progress Note    [facility]ON=[facility]    [facility]. Tamra Rice., VAUGHN.OLIOSerenityI. Reina Carrillo D.O., RICHIEOJAMIN Vallejo D.O. Clarita Soto, MSN, APRN, NP-C  Dolores Lee. Gary Euceda, MSN, APRN-CNP     Primary Care Physician: Kayode Wagoner MD   Admitting Physician:  Jere Hamilton DO  Admission date and time: 10/21/2022  4:18 AM    Room:  [address] [place] [address]  Admitting diagnosis: Proctocolitis [K52.9]  Diverticulosis [K57.90]  Acute kidney injury [facility]) [N17.9]  Idiopathic proctocolitis, other complication [facility]) [Y35.171]    Patient Name: Lee Ann Dawn  MRN: [id number]    Date of Service: 10/26/2022     Subjective:  August Lima is a 76 y.o. female who was seen and examined today,10/26/2022, at the bedside. After refusing EGD yesterday, diet was instituted and she seems to have tolerated this without complication. She denies any overt abdominal pain today. We discussed her downward trending creatinine. Unfortunately, echocardiogram is indicating severe cardiomyopathy. We are limited with medication titration with her renal function. She remains profoundly weak and deconditioned and was unable to do any significant activity yesterday. We offered temporary skilled nursing facility placement and she continues to defer. She remains cantankerous and rather disagreeable during the examination. No family members have been to visit her since this hospitalization but expect to bring her home. Review of System:   Constitutional:   Lying in bed without complaint. HEENT:   Denies ear pain, sore throat, sinus or eye problems. Maintained on nasal cannula oxygen. Cardiovascular:   Denies chest pain or chest discomfort. Admits to resolved palpitations. Respiratory:   Shortness of breath continues to improve. Gastrointestinal:   Diet has been advanced and she did tolerate this without complication. She has no additional abdominal pain.   Genitourinary:    Less irritation associated with the Vargas catheter. Extremities:   Denies lower extremity swelling, edema or cyanosis. Neurology:    Denies any headache or focal neurological deficits, admits to generalized weakness and deconditioning. Psch:   Denies being anxious or depressed. I question early dementia   Musculoskeletal:    Denies  myalgias, joint complaints or back pain. Integumentary:   Denies any rashes, ulcers, or excoriations. Denies bruising. Hematologic/Lymphatic:  Denies bruising or bleeding. Physical Exam:  No intake/output data recorded. Intake/Output Summary (Last 24 hours) at 10/26/2022 0946  Last data filed at 10/26/2022 4132  Gross per 24 hour   Intake 987.47 ml   Output 1400 ml   Net -412.53 ml   I/O last 3 completed shifts: In: 3584.1 [I.V.:3465.5; IV Piggyback:118.7]  Out: 7543 [Urine:2300]  Patient Vitals for the past 96 hrs (Last 3 readings):   Weight   10/26/22 0600 205 lb 1.6 oz (93 kg)   10/25/22 0259 205 lb (93 kg)   10/24/22 0458 196 lb (88.9 kg)     Vital Signs:   Blood pressure 103/71, pulse 90, temperature (!) 96.2 °F (35.7 °C), temperature source Axillary, resp. rate 19, height 5' 6\" (1.676 m), weight 205 lb 1.6 oz (93 kg), SpO2 100 %. General appearance:  Cantankerous and disagreeable on examination today. Head:  Normocephalic. No masses, lesions or tenderness. Eyes:  PERRLA. EOMI. Sclera clear. Buccal mucosa moist.  ENT:  Ears normal. Mucosa normal.  Nasal cannula oxygen is in place. Neck:    Supple. Trachea midline. No thyromegaly. No JVD. No bruits. Heart:    Rhythm regular. Rate controlled. Systolic murmur. Lungs:    Symmetrical. Clear to auscultation bilaterally. No wheezes. No rhonchi. No rales. Abdomen:   Soft. Mildly tender to palpation diffusely but significant improvement. Extremities:    Peripheral pulses present. No peripheral edema. No ulcers. No cyanosis. No clubbing. Neurologic:    Alert x 3. No focal deficit. Cranial nerves grossly intact.  No focal weakness. Psych:   I question early dementia. She becomes easily agitated. Musculoskeletal:   Spine ROM normal. Muscular strength intact. Gait not assessed. Integumentary:  No rashes  Skin normal color and texture.   Genitalia/Breast:  Deferred    Medication:  Scheduled Meds:   pantoprazole  40 mg Oral BID AC    metoprolol succinate  50 mg Oral BID    isosorbide mononitrate  30 mg Oral Daily    hydrALAZINE  10 mg Oral 3 times per day    piperacillin-tazobactam  3,375 mg IntraVENous Q12H    sodium chloride flush  5-40 mL IntraVENous 2 times per day    heparin flush  1 mL IntraVENous 2 times per day    apixaban  5 mg Oral Daily    vitamin B-12  1,000 mcg Oral Daily    budesonide  0.5 mg Nebulization BID    Arformoterol Tartrate  15 mcg Nebulization BID    sodium chloride flush  5-40 mL IntraVENous 2 times per day    sennosides-docusate sodium  2 tablet Oral BID    polyethylene glycol  17 g Oral BID    lactulose  20 g Oral BID    ipratropium-albuterol  1 ampule Inhalation Q4H WA    nicotine  1 patch TransDERmal Daily     Continuous Infusions:   dextrose 5 % and 0.45 % NaCl 100 mL/hr at 10/26/22 0129    sodium chloride      sodium chloride         Objective Data:  CBC with Differential:    Lab Results   Component Value Date/Time    WBC 9.3 10/26/2022 03:21 AM    RBC 4.26 10/26/2022 03:21 AM    HGB 11.4 10/26/2022 03:21 AM    HCT 36.8 10/26/2022 03:21 AM     10/26/2022 03:21 AM    MCV 86.4 10/26/2022 03:21 AM    MCH 26.8 10/26/2022 03:21 AM    MCHC 31.0 10/26/2022 03:21 AM    RDW 18.0 10/26/2022 03:21 AM    LYMPHOPCT 14.7 10/26/2022 03:21 AM    MONOPCT 10.1 10/26/2022 03:21 AM    BASOPCT 0.2 10/26/2022 03:21 AM    MONOSABS 0.94 10/26/2022 03:21 AM    LYMPHSABS 1.37 10/26/2022 03:21 AM    EOSABS 0.04 10/26/2022 03:21 AM    BASOSABS 0.02 10/26/2022 03:21 AM     CMP:    Lab Results   Component Value Date/Time     10/26/2022 03:21 AM    K 4.2 10/26/2022 03:21 AM    K 3.3 07/06/2022 08:30 AM     10/26/2022 03:21 AM    CO2 23 10/26/2022 03:21 AM    BUN 79 10/26/2022 03:21 AM    CREATININE 2.0 10/26/2022 03:21 AM    GFRAA >60 07/06/2022 08:30 AM    LABGLOM 25 10/26/2022 03:21 AM    GLUCOSE 152 10/26/2022 03:21 AM    PROT 5.2 10/26/2022 03:21 AM    LABALBU 3.1 10/26/2022 03:21 AM    CALCIUM 8.2 10/26/2022 03:21 AM    BILITOT 0.7 10/26/2022 03:21 AM    ALKPHOS 86 10/26/2022 03:21 AM     10/26/2022 03:21 AM     10/26/2022 03:21 AM       Assessment:  Acute proctocolitis in the setting of severe constipation   Radiographic evidence of duodenitis with the patient refusing EGD evaluation  Coagulase-negative Staphylococcus bacteremia x1 bottle compatible with contamination  Acute renal failure that is multifactorial in nature  Atrial fibrillation with intermittent periods of rapid ventricular response  Echocardiographic evidence of severe cardiomyopathy with ejection fraction of 10 to 15%  History of non-Hodgkin's lymphoma currently in remission  COPD with ongoing tobacco abuse  Early dementia    Plan:   I spoke with her granddaughter, Bentley Arthur, and updated her regarding the current situation. Modesto Mcgill has multiple issues at play. Echocardiogram is now indicating severe cardiomyopathy with ejection fraction of 10 to 15%. Hydralazine and nitrates were added yesterday as the patient would be intolerant to Entresto/spironolactone with her degree of renal dysfunction. Fortunately, creatinine is trending in the appropriate direction. We appreciate the input from the nephrology team.  The patient was instituted on a diet yesterday as she is refused EGD. She did seem to tolerate this okay. Antibiotics are being adjusted as per the infectious disease team.  She remains cantankerous with evolving dementia. I have suggested temporary skilled nursing facility placement to the granddaughter and she voiced understanding and agreement.   She and her mother will be in today to meet with the patient to discuss discharge plan. We appreciate the input from the multiple subspecialist providing care. More than 50% of my  time was spent at the bedside counseling/coordinating care with the patient and/or family with face to face contact. This time was spent reviewing notes and laboratory data as well as instructing and counseling the patient. Time I spent with the family or surrogate(s) is included only if the patient was incapable of providing the necessary information or participating in medical decisions. I also discussed the differential diagnosis and all of the proposed management plans with the patient and individuals accompanying the patient. [facility] requires this high level of physician care and nursing on the IMC/Telemetry unit due the complexity of decision management and chance of rapid decline or death. Continued cardiac monitoring and higher level of nursing are required. I am readily available for any further decision-making and intervention.      Jere Hamilton DO, FSerenityA.C.O.I.  10/26/2022  9:46 AM

## 2022-10-26 NOTE — PROGRESS NOTES
Nephrology Progress Note  [facility]      From consult 10/24/22-  HPI:   Qian Espinoza is a 76year old female who came to the hospital 10/21 for evaluation of lower abdominal pain and constipation. She was admitted for pain management and mild proctocolitis. She has a PMH of non-Hodgkin's lymphoma in remission since 2015. During her work up she had a CT with IV contrast on 10/21. At that time her creatinine was 1.5 mg/dl and has since risen to 2.5 mg/dl today and renal consult was requested. She has also been dosed with Vancomycin, awaiting Vanc level Zero eosinophils in urine  It appears she was seen in Jan 2022 at [facility] ([facility] for MAGGI. Baseline creatinine from 7/2022 is 1.0-1.1 mg/dl. She is confused and is unable to provide any meaningful history. 10/26/22- resting easily awakens no distress.      PMH:    Past Medical History:   Diagnosis Date    Hypertension     Lymphoma ([facility] [address]     remission since 2015       Patient Active Problem List   Diagnosis    Generalized abdominal pain    Proctocolitis       Meds:     pantoprazole  40 mg Oral BID AC    metoprolol succinate  50 mg Oral BID    isosorbide mononitrate  30 mg Oral Daily    hydrALAZINE  10 mg Oral 3 times per day    piperacillin-tazobactam  3,375 mg IntraVENous Q12H    sodium chloride flush  5-40 mL IntraVENous 2 times per day    heparin flush  1 mL IntraVENous 2 times per day    apixaban  5 mg Oral Daily    vitamin B-12  1,000 mcg Oral Daily    budesonide  0.5 mg Nebulization BID    Arformoterol Tartrate  15 mcg Nebulization BID    sodium chloride flush  5-40 mL IntraVENous 2 times per day    sennosides-docusate sodium  2 tablet Oral BID    polyethylene glycol  17 g Oral BID    lactulose  20 g Oral BID    ipratropium-albuterol  1 ampule Inhalation Q4H WA    nicotine  1 patch TransDERmal Daily        dextrose 5 % and 0.45 % NaCl 100 mL/hr at 10/26/22 0129    sodium chloride      sodium chloride         Meds prn:     metoprolol, sodium chloride flush, sodium chloride, heparin flush, fentanNYL, albuterol, magnesium sulfate, sodium phosphate IVPB **OR** sodium phosphate IVPB, potassium chloride **OR** potassium alternative oral replacement **OR** potassium chloride, sodium chloride flush, sodium chloride, ondansetron **OR** ondansetron, acetaminophen **OR** acetaminophen, bisacodyl    Meds prior to admission:     No current facility-administered medications on file prior to encounter. Current Outpatient Medications on File Prior to Encounter   Medication Sig Dispense Refill    albuterol sulfate HFA (VENTOLIN HFA) 108 (90 Base) MCG/ACT inhaler Inhale 2 puffs into the lungs every 6 hours as needed for Wheezing      vitamin B-12 (CYANOCOBALAMIN) 1000 MCG tablet Take 1,000 mcg by mouth daily      metoprolol succinate (TOPROL XL) 50 MG extended release tablet Take 50 mg by mouth nightly      aspirin 81 MG EC tablet Take 81 mg by mouth daily      apixaban (ELIQUIS) 5 MG TABS tablet Take 5 mg by mouth daily      melatonin 5 MG TABS tablet Take 5 mg by mouth nightly      methylPREDNISolone (MEDROL) 4 MG tablet Take 2 mg by mouth 3 times daily         Allergies:    Bactrim [sulfamethoxazole-trimethoprim], Ibuprofen, and Codeine    Social History:     reports that she has been smoking. She has been smoking an average of 2 packs per day. She has never used smokeless tobacco. She reports that she does not drink alcohol and does not use drugs. Family History:     History reviewed. No pertinent family history.     ROS:     All pertinent + discussed in HPI  All other sx negative     Physical Exam:      Patient Vitals for the past 24 hrs:   BP Temp Temp src Pulse Resp SpO2 Weight   10/26/22 0857 103/71 -- -- 90 -- -- --   10/26/22 0727 103/71 (!) 96.2 °F (35.7 °C) Axillary 100 19 100 % --   10/26/22 0615 89/64 98.9 °F (37.2 °C) Oral (!) 104 19 98 % --   10/26/22 0600 -- -- -- -- -- -- 205 lb 1.6 oz (93 kg)   10/25/22 2235 130/78 -- -- (!) 106 -- -- --   10/25/22 2037 128/75 98.2 °F (36.8 °C) Oral (!) 115 19 99 % --   10/25/22 1714 -- -- -- (!) 125 19 99 % --   10/25/22 1059 (!) 140/116 97.5 °F (36.4 °C) Axillary (!) 114 17 100 % --         Intake/Output Summary (Last 24 hours) at 10/26/2022 1044  Last data filed at 10/26/2022 0608  Gross per 24 hour   Intake 987.47 ml   Output 1400 ml   Net -412.53 ml       Constitutional: Patient appears stated age  Head: normocephalic, atraumatic   Neck: supple, no jvd  Cardiovascular: tachycardic- irregular  Respiratory: Clear upper, diminished in bases   Gastrointestinal: soft, nontender, distended  Ext: no edema   Neuro: awake and alert  Skin: dry, no rash       Data:    Recent Labs     10/24/22  0544 10/25/22  0340 10/26/22  0321   WBC 9.7 9.7 9.3   HGB 12.3 11.8 11.4*   HCT 39.4 38.3 36.8   MCV 86.0 84.9 86.4    271 220       Recent Labs     10/24/22  0544 10/24/22  1422 10/25/22  0340 10/25/22  1009 10/25/22  1610 10/25/22  2219 10/26/22  0321      < > 139   < > 137 138 138   K 4.7   < > 4.4   < > 5.0 4.4 4.2      < > 102   < > 101 103 104   CO2 21*   < > 22   < > 21* 21* 23   CREATININE 2.5*   < > 2.4*   < > 2.3* 2.2* 2.0*   BUN 91*   < > 88*   < > 82* 83* 79*   LABGLOM 19   < > 20   < > 21 22 25   GLUCOSE 133*   < > 112*   < > 165* 191* 152*   CALCIUM 8.8   < > 8.4*   < > 8.6 8.1* 8.2*   PHOS 5.3*  --  4.5  --   --   --  3.8   MG 2.6  --  2.6  --   --   --  2.5    < > = values in this interval not displayed.        No results found for: VITD25    No results found for: PTH    Recent Labs     10/24/22  0544 10/25/22  0340 10/26/22  0321   ALT 65* 83* 123*   AST 88* 111* 156*   ALKPHOS 92 86 86   BILITOT 1.2 1.1 0.7   BILIDIR 0.5* 0.7* 0.4*       Recent Labs     10/24/22  0544 10/25/22  0340 10/26/22  0321   LABALBU 3.4* 3.3* 3.1*       No results found for: FERRITIN, IRON, TIBC    No results found for: AXQHDIPZ88    No results found for: FOLATE      Lab Results   Component Value Date/Time    COLORU Yellow 12/13/2017 04:10 PM    NITRU Negative 12/13/2017 04:10 PM    GLUCOSEU Negative 12/13/2017 04:10 PM    KETUA Negative 12/13/2017 04:10 PM    UROBILINOGEN 0.2 12/13/2017 04:10 PM    BILIRUBINUR Negative 12/13/2017 04:10 PM       Lab Results   Component Value Date/Time    NICK Parks 10/21/2022 06:31 AM       No components found for: URIC    No results found for: [facility] [[id number]] Collected: 10/23/22 1112      Order Status: Completed Updated: 10/23/22 1116     Narrative:       EXAMINATION:   RETROPERITONEAL ULTRASOUND OF THE KIDNEYS AND URINARY BLADDER     10/23/2022     COMPARISON:   None     HISTORY:   ORDERING SYSTEM PROVIDED HISTORY: ARF   TECHNOLOGIST PROVIDED HISTORY:     Reason for exam:->ARF   What reading provider will be dictating this exam?->CRC     FINDINGS:     Kidneys: The right kidney measures 9.7 cm in length and the left kidney measures 8.9   cm in length. Cortex within normal limits measuring 7 mm on the right and 9   mm on the left. Kidneys demonstrate normal cortical echogenicity. No evidence of   hydronephrosis or intrarenal stones. However, there is cyst lower pole left   kidney measuring 1.6 x 1.4 x 1.1 cm. Impression:       Simple appearing cyst lower pole left kidney. Otherwise, negative ultrasound   of the kidneys. Assessment and Plan:    Acute kidney injury-  Likely in the setting of contrast.  BP's trending on the lower side in the  range she is also a-fib RVR  Baseline creatinine 1.0-1.1 mg/dl  FeNa <1%  Renal US- no hydro, simple cyst.   Creatinine 1.6-->2.5->2.0mg/dl  Continue  IVF   UOP as recorded 1400 ml past 24 hours. Continue IVF  Follow labs    2. HAG Metabolic acidosis with elevated lactic acid-  Off IV HCO3  CO2 20->17->20->21->27->23  Follow     3. Hypotension-  In the setting of a-fib RVR  On lopressor for rate control  BP's past 24 hours stable.      4. Hyperphosphatemia-  In the setting of MAGGI  Will follow   PO4 5.3-->4.5->3.8      Anthony Washington Corazon Biswas - BONIFACIO    Patient examined doing well feels ok   Upset as daughter suggested NH placement of rehab     Renal functions slightly better     Plan as outlined above     Dr Carolina Hernández

## 2022-10-26 NOTE — CARE COORDINATION
SS NOTE: PT WILL NEED A RAPID NEGATIVE COVID TEST THE DAY OF [facility] TO THE NEW SNF. Pt has a telesitter due to impulsive getting up out of bed. Pt is on room air. Pt had an ECHO with and EF of 10%. She has a Midline and is on IV Vanc and Protonix. PT / OT are on- yesterday ambulation was not assessed and it took max assist of 2 to transfer her. SW met with pt, her dtr and granddter in pt's room this day. They have chosen the following SNFs: [facility] of the [place] [address] and [address] [place] [address] SNF ARE FULL, [address]- AWAITING [facility] or [facility]. SW completed a referral to [facility] and await their decision. For a new SNF pt will need a PRECERT, signed NIKKI, current PT and OT notes and SW will need to complete the HENs. SS to continue. DANIELA Alexander.10/26/2022.4:24pM

## 2022-10-26 NOTE — PROGRESS NOTES
OT BEDSIDE TREATMENT NOTE      Date:10/26/2022  Patient Name: René Montes  MRN: [id number]  : 1945  Room: [address] [place] [address]        Evaluating OT: Beatrice Garcia OTR/L; [id number]        Referring Provider and Orders/Date:     OT eval and treat  Start:  10/21/22 1100,   End:  10/21/22 1100,   ONE TIME,   Standing Count:  1 Occurrences,   R         Ariela Martin DO         Diagnosis:   1. Proctocolitis    2. Diverticulosis    3.  Acute kidney injury [facility])          Surgery: none      Pertinent Medical History:        Past Medical History           Past Medical History:   Diagnosis Date    Hypertension      Lymphoma ([facility] [address]       remission since              Past Surgical History             Past Surgical History:   Procedure Laterality Date    APPENDECTOMY        CHOLECYSTECTOMY, OPEN        HYSTERECTOMY (CERVIX STATUS UNKNOWN)        INCISIONAL HERNIA REPAIR               Precautions:  Fall Risk, impulsive, increased HR (pt's HR ranged between 109-119 BPM)    Recommended placement: subacute    Assessment of current deficits     [x] Functional mobility           [x]ADLs           [x] Strength                  [x]Cognition     [x] Functional transfers         [x] IADLs         [x] Safety Awareness   [x]Endurance     [] Fine Coordination                        [x] Balance      [] Vision/perception   []Sensation       [x]Gross Motor Coordination            [] ROM           [] Delirium                   [] Motor Control      OT PLAN OF CARE   OT POC based on physician orders, patient diagnosis and results of clinical assessment     Frequency/Duration 1-3 days/wk for 2 weeks PRN   Specific OT Treatment Interventions to include:   * Instruction/training on adapted ADL techniques and AE recommendations to increase functional independence within precautions       * Training on energy conservation strategies, correct breathing pattern and techniques to improve independence/tolerance for self-care routine  * Functional transfer/mobility training/DME recommendations for increased independence, safety, and fall prevention  * Patient/Family education to increase follow through with safety techniques and functional independence  * Recommendation of environmental modifications for increased safety with functional transfers/mobility and ADLs  * Cognitive retraining/development of therapeutic activities to improve problem solving, judgement, memory, and attention for increased safety/participation in ADL/IADL tasks  * Therapeutic exercise to improve motor endurance, ROM, and functional strength for ADLs/functional transfers  * Therapeutic activities to facilitate/challenge dynamic balance, stand tolerance for increased safety and independence with ADLs  * Therapeutic activities to facilitate gross/fine motor skills for increased independence with ADLs  * Neuro-muscular re-education: facilitation of righting/equilibrium reactions, midline orientation, scapular stability/mobility, normalization of muscle tone, and facilitation of volitional active controled movement  * Positioning to improve skin integrity, interaction with environment and functional independence      Recommended Adaptive Equipment/DME:  TBD       Home Living: Pt lives at home with daughter who has a brain trauma from a few years(hx of aneurysm). 2 story home 3 steps to enter with rails. First floor set up.      Bathroom setup: tub shower with shower chair if needed and standard toilet    DME owned: cane, ww, wc      Prior Level of Function: indep with ADLs but not thorough or consistent, assist with IADLs; ambulated with cane   Driving: no   Occupation: none   Enjoys: watching TV, limited since CA diagnosis      Pain Level: no c/o pain at this time  Cognition: A&O: 3/4; Follows 2 step directions              Memory:  fair-              Sequencing:  fair-              Problem solving:  poor+              Judgement/safety:  poor+     AM-PAC Daily Activity Inpatient   How much help for putting on and taking off regular lower body clothing?: A Lot  How much help for Bathing?: A Lot  How much help for Toileting?: A Lot  How much help for putting on and taking off regular upper body clothing?: A Little  How much help for taking care of personal grooming?: A Little  How much help for eating meals?: A Little  AM-PAC Inpatient Daily Activity Raw Score: 15    Functional Assessment:      Initial Eval Status  Date: 10/21/2022   Treatment Status  Date:10/25/22 STGs = LTGs  Time frame: 10-14 days   Feeding Supervision with drinking and impulsivity  Pt able to manage containers with min A when seated in bed  Indep   Grooming Stand by Assist at sink for hand wash and hair comb Pt able to wash face with set up assist; pt brushed  hair with SBA; pt declined oral hygiene 2* to pt not having teeth with her  Independent    UB Dressing Minimal Assist with gown management Min A/Mod A for gown management; pt able to thread RUE through sleeves; assist to snap gown around L UE; assist to tie/untie back of gown  Independent    LB Dressing Moderate Assist with socks and pants to thread over R foot and standing balance Max A to don socks; pt declined education on use of sock aid; pt able to cross LE's to don incontinence brief over B feet; mod A to don garment over buttocks when standing Stand by Assist    Bathing Moderate Assist with sponge bathing from sitting/standing due to impulsivity  Mod A ; pt able to wash UB when seated EOB; pt able to  complete pericare with min A for standing balance with anterior support; max A for rear hygiene d/t incontinence of bowel; assist to wash back when seated EOB; pt declined LE bathing as she states she's \"not dirty\"; assist to wash B feet when long seated in bed Stand by Assist    Toileting Minimal Assist for safety and balance with cane Mod/Max A for rear hygiene 2* to slight incontinence of bowel  Stand by Assist    Bed Mobility  Pt up in arm chair on arrival. Supine to sit with min A to support UB to sitting ; pt able to support LE's to EOB; scooting at [facility] Bowels A; sit to supine at SBA with pt able to support LE's to supine Supine to sit: Independent   Sit to supine: Independent    Functional Transfers Minimal Assist from arm chair and toilet with cane Sit to stand transfers to/from EOB x 3 reps with min A with anterior support Modified [place]    Functional Mobility Minimal Assist with cane for short distance functional mobility throughout the room to simulate house hold distances. Min A with HHA to side step to [facility]; pt may benefit from use of FWW for increased balance Modified [place]    Balance Sitting:     Static:  fair+    Dynamic:fair  Standing: fair- Sitting:     Static:  fair+    Dynamic:fair  Standing: fair/fair minus Sitting:     Static:  good    Dynamic:fair+  Standing: fair   Activity Tolerance Vitals with activity:room air with fair- tolerance and signs of SOB per \"smoking\"; 95% HR74; standing tolerance 1min average    Fair/fair minus; pt's HR ranged 109-119 BPM; nsg aware; pt fatigued quickly Increase standing tolerance for >3min with stable vital signs for carry over into toileting, functional tranfers and indep in ADLs   Visual/  Perceptual Glasses: Present; WFL     Reports change in vision since admission: No      NA      Hand Dominance  [x] Right   [] Left   Hearing: WFL   Sensation:  No c/o numbness or tingling   Tone: WFL   Edema: none      Comments: Patient cleared by nursing staff. Upon arrival pt seated in bed. Pt agreeable to OT tx session. Pt educated with regards to bed mobility, hand placement, safety awareness, static sitting balance,  standing balance, transfer training, functional mobility, ADL retraining,  grooming tasks, UE/LE bathing, LE/UE dressing, toileting/hygiene, ECT's. At end of session pt seated in bed with positioning provided to eat lunch;  all lines and tubes intact, call light within reach.  Overall, pt demonstrated decreased independence and safety during completion of ADL/functional transfers/mobility tasks. Pt would benefit from continued skilled OT to increase safety and independence with completion of ADL/IADL tasks for functional independence and quality of life. Pt required cues and education as noted above for safe facilitation and completion of tasks. Therapist provided skilled monitoring of patient's response during treatment session. Prior to and at the end of session, environmental modifications / line management completed for patients safety and efficiency of treatment session. Overall, patient demonstrates moderate difficulties with completion of BADLs and IADLs. Factors contributing to these difficulties include bowel incontinence, decreased endurance, and generalized weakness. As noted above, patient likely to benefit from further OT intervention to increase independence, safety, and overall quality of life. Treatment:     Bed mobility: Facilitated bed mobility with cues for proper body mechanics and sequencing to prepare for ADL completion. Functional transfers: Facilitated transfers to/from EOB x 3 reps with cues for body alignment, safety and hand placement. ADL completion: Self-care retraining for the above-mentioned ADLs; training on proper hand placement, safety technique, sequencing, and energy conservation techniques. Postural Balance: Sitting/standing balance retraining to improve righting reactions with postural changes during ADLs. Skilled positioning: Proper positioning to improve interaction with environment, overall functioning and decrease/prevent edema and contractures.     Pt has made fair progress towards set goals       OT 1-3 days/wk for 2 weeks PRN     Treatment Time also includes thorough review of current medical information, gathering information on past medical history/social history and prior level of function, informal observation of tasks, assessment of data and education on plan of care and goals.     Treatment Time In: 11:37 AM     Treatment Time Out: 12:21 PM           Treatment Charges: Mins Units   ADL/Home Mgt     [id number] 27 2   Thera Activities     [id number] 12 1   Ther Ex                 [id number]       Manual Therapy    [id number]     Neuro Re-ed         54847     ROSA manage/training                               [id number]     Non Billable Time 5    Total Timed Treatment 44-5=39 [address], [place]/L #[id number]

## 2022-10-26 NOTE — PROGRESS NOTES
NAME: Montana Lindo  MR:  [id number]  :   1945  Admit Date:  10/21/2022      This is a face to face encounter with Montana Lindo 76 y.o. female on 10/26/22  Elements of this note, including Diagnosis,  Interval History, Past Medical/Surgical/Family/Social Histories, ROS, physical exam, and Assessment and Plan were copied and pasted from Previous. Updates have been made where noted and reflect current exam and medical decision making from the DOS of this encounter. CHIEF COMPLAINT     ID following for   Chief Complaint   Patient presents with    Abdominal Pain     Was seen at [facility] yesterday, not feeling any better at this time, when asked regarding pain, patient states I feel a lump here pointing to scar right mid to lower quadrant, states from previous hernia. HISTORY OF PRESENT ILLNESS     Assessment & Plan   Proctocolitis/perirectal dermatitis   Cons bacteremia- contaminant   transaminitis  [place]      Plan:   Continue zosyn for now  Change to PO augment  Will switch to po on discharge -  Labs reviewed   Med rec done   Can d/c from ID POV    Pt seen and examined    DOS  10/26/22  Sitting up on bedside toilet   Emotional this am. On 2 liters nasal canula. Reports loose stools   Has telesitter in room     Patient is tolerating medications. No reported adverse drug reactions. REVIEW OF SYSTEMS     As stated above in the chief complaint, otherwise negative.   CURRENT MEDICATIONS     Current Facility-Administered Medications:     pantoprazole (PROTONIX) tablet 40 mg, 40 mg, Oral, BID ACTheodore, , 40 mg at 10/26/22 0617    dextrose 5 % and 0.45 % sodium chloride infusion, , IntraVENous, Continuous, MASSIMO Cope CNP, Last Rate: 100 mL/hr at 10/26/22 0129, New Bag at 10/26/22 0129    metoprolol succinate (TOPROL XL) extended release tablet 50 mg, 50 mg, Oral, BID, Raquel Spain MD, 50 mg at 10/26/22 0901    isosorbide mononitrate (IMDUR) extended release tablet 30 mg, 30 mg, Oral, Daily, Nati Munguia MD, 30 mg at 10/26/22 0900    hydrALAZINE (APRESOLINE) tablet 10 mg, 10 mg, Oral, 3 times per day, Nati Munguia MD, 10 mg at 10/25/22 2236    metoprolol (LOPRESSOR) injection 5 mg, 5 mg, IntraVENous, Q5 Min PRN, [employer], DO, 5 mg at 10/25/22 0340    piperacillin-tazobactam (ZOSYN) 3,375 mg in sodium chloride 0.9 % 50 mL IVPB ([id number]), 3,375 mg, IntraVENous, Q12H, Carolin Hernandez DO, Stopped at 10/26/22 0054    sodium chloride flush 0.9 % injection 5-40 mL, 5-40 mL, IntraVENous, 2 times per day, Carolin Hernandez, DO, 10 mL at 10/25/22 2237    sodium chloride flush 0.9 % injection 5-40 mL, 5-40 mL, IntraVENous, PRN, Carolin Hernandez DO    0.9 % sodium chloride infusion, , IntraVENous, PRN, Carolin Hernandez, DO    heparin flush 100 UNIT/ML injection 100 Units, 1 mL, IntraVENous, 2 times per day, Carolin Hernandez DO    heparin flush 100 UNIT/ML injection 100 Units, 1 mL, IntraCATHeter, PRN, Carolin Hernandez DO    fentaNYL (SUBLIMAZE) injection 25 mcg, 25 mcg, IntraVENous, Q4H PRN, Carolin Hernandez, DO, 25 mcg at 10/23/22 1140    apixaban (ELIQUIS) tablet 5 mg, 5 mg, Oral, Daily, Carolin Hernandez, DO, 5 mg at 10/26/22 8103    vitamin B-12 (CYANOCOBALAMIN) tablet 1,000 mcg, 1,000 mcg, Oral, Daily, Carolin Hernandez, DO, 1,000 mcg at 10/26/22 0859    albuterol (PROVENTIL) nebulizer solution 2.5 mg, 2.5 mg, Nebulization, Q4H PRN, Carolin Hernandez, DO    budesonide (PULMICORT) nebulizer suspension 500 mcg, 0.5 mg, Nebulization, BID, Carolin Hernandez DO, 500 mcg at 10/26/22 0619    magnesium sulfate 1000 mg in dextrose 5% 100 mL IVPB, 1,000 mg, IntraVENous, PRN, Carolin Hernandez,     sodium phosphate 13.05 mmol in sodium chloride 0.9 % 250 mL IVPB, 0.16 mmol/kg, IntraVENous, PRN **OR** sodium phosphate 26.1 mmol in sodium chloride 0.9 % 500 mL IVPB, 0.32 mmol/kg, IntraVENous, PRN, Carolin Hernandez, DO    potassium chloride (KLOR-CON M) extended release tablet 40 mEq, 40 mEq, Oral, PRN **OR** potassium bicarb-citric acid (EFFER-K) effervescent tablet 40 mEq, 40 mEq, Oral, PRN, 40 mEq at 10/22/22 0827 **OR** potassium chloride 10 mEq/100 mL IVPB (Peripheral Line), 10 mEq, IntraVENous, PRN, Cindia Nuria, DO    Arformoterol Tartrate (BROVANA) nebulizer solution 15 mcg, 15 mcg, Nebulization, BID, Cindia Nuria, DO, 15 mcg at 10/26/22 9922    sodium chloride flush 0.9 % injection 5-40 mL, 5-40 mL, IntraVENous, 2 times per day, Cindia Nuria, DO, 10 mL at 10/23/22 2140    sodium chloride flush 0.9 % injection 5-40 mL, 5-40 mL, IntraVENous, PRN, Cindia Nuria, DO    0.9 % sodium chloride infusion, , IntraVENous, PRN, Cindia Nuria, DO    ondansetron (ZOFRAN-ODT) disintegrating tablet 4 mg, 4 mg, Oral, Q8H PRN **OR** ondansetron (ZOFRAN) injection 4 mg, 4 mg, IntraVENous, Q6H PRN, Cindia Nuria, DO, 4 mg at 10/23/22 2141    acetaminophen (TYLENOL) tablet 650 mg, 650 mg, Oral, Q6H PRN, 650 mg at 10/26/22 0857 **OR** acetaminophen (TYLENOL) suppository 650 mg, 650 mg, Rectal, Q6H PRN, Cindia Nuria, DO    sennosides-docusate sodium (SENOKOT-S) 8.6-50 MG tablet 2 tablet, 2 tablet, Oral, BID, Cindia Nuria, DO, 2 tablet at 10/23/22 2141    polyethylene glycol (GLYCOLAX) packet 17 g, 17 g, Oral, BID, Cindia Nuria, DO, 17 g at 10/23/22 2140    bisacodyl (DULCOLAX) EC tablet 5 mg, 5 mg, Oral, Daily PRN, Cindia Nuria, DO    lactulose (CHRONULAC) 10 GM/15ML solution 20 g, 20 g, Oral, BID, Cindia Nuria, DO, 20 g at 10/23/22 2141    ipratropium-albuterol (DUONEB) nebulizer solution 1 ampule, 1 ampule, Inhalation, Q4H WA, Emmanuel Quiñones, DO, 1 ampule at 10/26/22 0772    nicotine (NICODERM CQ) 21 MG/24HR 1 patch, 1 patch, TransDERmal, Daily, Yudelka Viramontes DO, 1 patch at 10/26/22 0903  PHYSICAL EXAM     /71   Pulse 90   Temp (!) 96.2 °F (35.7 °C) (Axillary)   Resp 19   Ht 5' 6\" (1.676 m)   Wt 205 lb 1.6 oz (93 kg)   SpO2 100%   BMI 33.10 kg/m²   Temp  Av.7 °F (36.5 °C)  Min: 96.2 °F (35.7 °C)  Max: 98.9 °F (37.2 °C)   CONSTITUTIONAL:  awake, alert, cooperative, no apparent distress, and appears stated age. Emotional this am  ENT:  Normocephalic, without obvious abnormality, atraumatic, sinuses   LUNGS:  No increased work of breathing, diminished to bases. clear to auscultation bilaterally, no crackles or wheezing. On nasal canula. CARDIOVASCULAR:  Normal apical impulse, regular rate and rhythm, normal S1 and S2,   ABDOMEN:  No scars, normal bowel sounds, soft, non-distended, non-tender, no masses palpated, no hepatosplenomegally  MUSCULOSKELETAL:  There is no redness, warmth, or swelling of the joints. Full range of motion noted. edema  NEUROLOGIC:  Awake, alert, oriented to name, place and time. Cranial nerves II-XII are grossly intact. SKIN:  normal skin color, texture, turgor and no rashes  PIV     Peripheral Intravenous Line:  Peripheral IV 10/23/22 Left Forearm (Active)   Site Assessment Clean, dry & intact 10/24/22 2000   Line Status Infusing 10/24/22 2000   Line Care Connections checked and tightened 10/24/22 2000   Phlebitis Assessment No symptoms 10/24/22 2000   Infiltration Assessment 0 10/24/22 2000   Alcohol Cap Used Yes 10/24/22 2000   Dressing Status Clean, dry & intact 10/24/22 2000   Dressing Type Transparent 10/24/22 2000   Dressing Intervention Other (Comment) 10/24/22 2000       Peripheral IV 10/24/22 Right Wrist (Active)   Site Assessment Clean, dry & intact 10/24/22 2000   Line Status Blood return noted; Flushed; Infusing 10/24/22 2000   Line Care Connections checked and tightened 10/24/22 2000   Phlebitis Assessment No symptoms 10/24/22 2000   Infiltration Assessment 0 10/24/22 2000   Alcohol Cap Used Yes 10/24/22 2000   Dressing Status Clean, dry & intact 10/24/22 2000   Dressing Type Transparent 10/24/22 2000   Dressing Intervention Other (Comment) 10/24/22 2000       Peripheral IV 10/25/22 Left; Anterior Cephalic (Active)   Site Assessment Clean, dry & intact 10/25/22 1248   Line Status Blood return noted;Brisk blood return;Flushed;Capped;Normal saline locked 10/25/22 1248   Phlebitis Assessment No symptoms 10/25/22 1248   Infiltration Assessment 0 10/25/22 1248   Alcohol Cap Used Yes 10/25/22 1248   Dressing Status New dressing applied;Clean, dry & intact 10/25/22 1248   Dressing Type Transparent 10/25/22 1248   Dressing Intervention New 10/25/22 1248       Drain(s):  Urinary Catheter 10/23/22 Vargas (Active)   $ Urethral catheter insertion $ Not inserted for procedure 10/23/22 1030   Catheter Indications Urinary retention (acute or chronic), continuous bladder irrigation or bladder outlet obstruction; Need for fluid volume management of the critically ill patient in a critical care setting 10/24/22 2000   Site Assessment No urethral drainage 10/24/22 2000   Urine Color Sally 10/24/22 2000   Urine Appearance Clear 10/24/22 2000   Collection Container Standard 10/24/22 2000   Securement Method Leg strap 10/24/22 2000   Catheter Care Completed Yes 10/24/22 2000   Catheter Best Practices  Drainage tube clipped to bed;Catheter secured to thigh; Bag below bladder;Bag not on floor; Lack of dependent loop in tubing;Drainage bag less than half full 10/24/22 2000   Status Draining 10/24/22 2000   Output (mL) 450 mL 10/25/22 0625     DIAGNOSTIC RESULTS   Radiology:    Recent Labs     10/24/22  0544 10/25/22  0340 10/26/22  0321   WBC 9.7 9.7 9.3   RBC 4.58 4.51 4.26   HGB 12.3 11.8 11.4*   HCT 39.4 38.3 36.8   MCV 86.0 84.9 86.4   MCH 26.9 26.2 26.8   MCHC 31.2* 30.8* 31.0*   RDW 18.0* 17.9* 18.0*    271 220   MPV 11.3 11.2 11.2       Recent Labs     10/24/22  0544 10/24/22  1422 10/25/22  0340 10/25/22  1009 10/25/22  1610 10/25/22  2219 10/26/22  0321      < > 139   < > 137 138 138   K 4.7   < > 4.4   < > 5.0 4.4 4.2      < > 102   < > 101 103 104   CO2 21*   < > 22   < > 21* 21* 23   BUN 91*   < > 88*   < > 82* 83* 79*   CREATININE 2.5*   < > 2.4*   < > 2.3* 2.2* 2.0*   GLUCOSE 133*   < > 112*   < > 165* 191* 152*   PROT 5.8*  --  5.5*  --   -- --  5.2*   LABALBU 3.4*  --  3.3*  --   --   --  3.1*   CALCIUM 8.8   < > 8.4*   < > 8.6 8.1* 8.2*   BILITOT 1.2  --  1.1  --   --   --  0.7   ALKPHOS 92  --  86  --   --   --  86   AST 88*  --  111*  --   --   --  156*   ALT 65*  --  83*  --   --   --  123*    < > = values in this interval not displayed. Recent Labs     10/24/22  0544 10/25/22  0340 10/26/22  0321   AST 88* 111* 156*   ALT 65* 83* 123*       Lab Results   Component Value Date/Time    CHOL 140 10/22/2022 05:09 AM    TRIG 83 10/22/2022 05:09 AM    HDL 21 10/22/2022 05:09 AM    LDLCALC 102 10/22/2022 05:09 AM    LABVLDL 17 10/22/2022 05:09 AM     Microbiology:   No results for input(s): COVID19 in the last 72 hours. Lab Results   Component Value Date/Time    BC 24 Hours no growth 10/24/2022 02:22 PM    BC  10/21/2022 06:45 AM     24 Hours no growth  Gram stain performed from blood culture bottle media  Gram positive cocci in [facility]  10/21/2022 06:45 AM     This organism was isolated in one set. Susceptibility testing is not routinely done as this  organism frequently represents skin contamination. Additional testing can be ordered by calling the  Microbiology Department. BLOODCULT2 24 Hours no growth 10/24/2022 02:22 PM    BLOODCULT2 24 Hours no growth 10/21/2022 10:12 AM    ORG Staphylococcus coagulase-negative 10/21/2022 06:45 AM     Imaging and labs were reviewed per medical records. The patient was educated about the diagnosis, prognosis, indications, risks and benefits of treatment. An opportunity to ask questions was given to the patient/FAMILY. Thank you for involving me in the care of Tamie Joyce will continue to follow. Please do not hesitate to call [phone] for any questions or concerns. Electronically signed by MASSIMO Virgen on 10/26/2022 at 10:41 AM        As above         This is a face to face encounter with [place]andra Celeste on 10/26/22.   I discussed the findings and plans with  NURSE PRACTITIONER, MASSIMO Rose and agree as documented in her note. See below for additional details and treatment plan. In bed has telesitter  Feels cold has no c/o f/c/n/v/d/  cont atbx  Switch to po Augmentin   Imaging and labs were reviewed. Bre Liner was informed of their diagnosis, indications, risks and benefits of treatment. Bre Liner had the opportunity to ask questions. All questions were answered. Thank you for involving me in the care of Bre Liner. Please do not hesitate to call for any questions or concerns.     Electronically signed by Vic Goss MD on 10/26/2022 at 4:14 PM

## 2022-10-27 NOTE — PROGRESS NOTES
Nephrology Progress Note  [facility]      From consult 10/24/22-  HPI:   Monica Whitt is a 76year old female who came to the hospital 10/21 for evaluation of lower abdominal pain and constipation. She was admitted for pain management and mild proctocolitis. She has a PMH of non-Hodgkin's lymphoma in remission since 2015. During her work up she had a CT with IV contrast on 10/21. At that time her creatinine was 1.5 mg/dl and has since risen to 2.5 mg/dl today and renal consult was requested. She has also been dosed with Vancomycin, awaiting Vanc level Zero eosinophils in urine  It appears she was seen in Jan 2022 at [facility] for MAGGI. Baseline creatinine from 7/2022 is 1.0-1.1 mg/dl. She is confused and is unable to provide any meaningful history.      10/27/22- not happy about going to [facility]    PMH:    Past Medical History:   Diagnosis Date    Hypertension     Lymphoma ([facility] [address]     remission since 2015       Patient Active Problem List   Diagnosis    Generalized abdominal pain    Proctocolitis       Meds:     amoxicillin-clavulanate  1 tablet Oral 2 times per day    pantoprazole  40 mg Oral BID AC    metoprolol succinate  50 mg Oral BID    isosorbide mononitrate  30 mg Oral Daily    hydrALAZINE  10 mg Oral 3 times per day    sodium chloride flush  5-40 mL IntraVENous 2 times per day    heparin flush  1 mL IntraVENous 2 times per day    apixaban  5 mg Oral Daily    vitamin B-12  1,000 mcg Oral Daily    budesonide  0.5 mg Nebulization BID    Arformoterol Tartrate  15 mcg Nebulization BID    sodium chloride flush  5-40 mL IntraVENous 2 times per day    sennosides-docusate sodium  2 tablet Oral BID    polyethylene glycol  17 g Oral BID    lactulose  20 g Oral BID    ipratropium-albuterol  1 ampule Inhalation Q4H WA    nicotine  1 patch TransDERmal Daily        dextrose 5 % and 0.45 % NaCl 50 mL/hr at 10/26/22 2134    sodium chloride 10 mL/hr at 10/26/22 1500    sodium chloride         Meds prn:     metoprolol, sodium chloride flush, sodium chloride, heparin flush, fentanNYL, albuterol, magnesium sulfate, sodium phosphate IVPB **OR** sodium phosphate IVPB, potassium chloride **OR** potassium alternative oral replacement **OR** potassium chloride, sodium chloride flush, sodium chloride, ondansetron **OR** ondansetron, acetaminophen **OR** acetaminophen, bisacodyl    Meds prior to admission:     No current facility-administered medications on file prior to encounter. Current Outpatient Medications on File Prior to Encounter   Medication Sig Dispense Refill    albuterol sulfate HFA (VENTOLIN HFA) 108 (90 Base) MCG/ACT inhaler Inhale 2 puffs into the lungs every 6 hours as needed for Wheezing      vitamin B-12 (CYANOCOBALAMIN) 1000 MCG tablet Take 1,000 mcg by mouth daily      metoprolol succinate (TOPROL XL) 50 MG extended release tablet Take 50 mg by mouth nightly      aspirin 81 MG EC tablet Take 81 mg by mouth daily      apixaban (ELIQUIS) 5 MG TABS tablet Take 5 mg by mouth daily      melatonin 5 MG TABS tablet Take 5 mg by mouth nightly      methylPREDNISolone (MEDROL) 4 MG tablet Take 2 mg by mouth 3 times daily         Allergies:    Bactrim [sulfamethoxazole-trimethoprim], Ibuprofen, and Codeine    Social History:     reports that she has been smoking. She has been smoking an average of 2 packs per day. She has never used smokeless tobacco. She reports that she does not drink alcohol and does not use drugs. Family History:     History reviewed. No pertinent family history.     ROS:     All pertinent + discussed in HPI  All other sx negative     Physical Exam:      Patient Vitals for the past 24 hrs:   BP Temp Temp src Pulse Resp SpO2 Weight   10/27/22 0902 -- 97.3 °F (36.3 °C) Oral -- -- -- --   10/27/22 0751 96/64 -- -- (!) 106 13 96 % --   10/27/22 0645 -- -- -- -- -- 96 % --   10/27/22 0600 -- -- -- -- -- -- 208 lb (94.3 kg)   10/27/22 0526 -- -- -- -- -- 98 % --   10/27/22 0517 80/69 97.6 °F (36.4 °C) Axillary (!) 103 -- 96 % --   10/26/22 2118 (!) 120/58 -- -- (!) 104 -- -- --   10/26/22 1954 (!) 121/59 98 °F (36.7 °C) Axillary (!) 104 18 -- --   10/26/22 1914 (!) 80/54 -- -- (!) 128 -- 100 % --   10/26/22 1840 -- -- -- -- -- 98 % --   10/26/22 1300 100/69 -- -- 85 -- -- --         Intake/Output Summary (Last 24 hours) at 10/27/2022 1033  Last data filed at 10/27/2022 0528  Gross per 24 hour   Intake 2439.97 ml   Output 1200 ml   Net 1239.97 ml       Constitutional: Patient appears stated age  Head: normocephalic, atraumatic   Neck: supple, no jvd  Cardiovascular: tachycardic- irregular  Respiratory: Clear upper, diminished in bases   Gastrointestinal: soft, nontender, distended  Ext: no edema   Neuro: awake and alert  Skin: dry, no rash       Data:    Recent Labs     10/25/22  0340 10/26/22  0321 10/27/22  0613   WBC 9.7 9.3 9.3   HGB 11.8 11.4* 12.1   HCT 38.3 36.8 39.6   MCV 84.9 86.4 87.0    220 205       Recent Labs     10/25/22  0340 10/25/22  1009 10/25/22  2219 10/26/22  0321 10/26/22  1021 10/27/22  0613      < > 138 138 136  --    K 4.4   < > 4.4 4.2 4.4  --       < > 103 104 104  --    CO2 22   < > 21* 23 22  --    CREATININE 2.4*   < > 2.2* 2.0* 2.0*  --    BUN 88*   < > 83* 79* 75*  --    LABGLOM 20   < > 22 25 25  --    GLUCOSE 112*   < > 191* 152* 161*  --    CALCIUM 8.4*   < > 8.1* 8.2* 8.3*  --    PHOS 4.5  --   --  3.8  --  4.1   MG 2.6  --   --  2.5  --  2.6    < > = values in this interval not displayed.        No results found for: VITD25    No results found for: PTH    Recent Labs     10/25/22  0340 10/26/22  0321 10/27/22  0613   ALT 83* 123* 109*   * 156* 84*   ALKPHOS 86 86 87   BILITOT 1.1 0.7 0.9   BILIDIR 0.7* 0.4* 0.5*       Recent Labs     10/25/22  0340 10/26/22  0321 10/27/22  0613   LABALBU 3.3* 3.1* 3.3*       No results found for: FERRITIN, IRON, TIBC    No results found for: [id number]    No results found for: FOLATE      Lab Results   Component Value Date/Time COLORU Yellow 12/13/2017 04:10 PM    NITRU Negative 12/13/2017 04:10 PM    GLUCOSEU Negative 12/13/2017 04:10 PM    KETUA Negative 12/13/2017 04:10 PM    UROBILINOGEN 0.2 12/13/2017 04:10 PM    BILIRUBINUR Negative 12/13/2017 04:10 PM       Lab Results   Component Value Date/Time    NICK Parks 10/21/2022 06:31 AM       No components found for: URIC    No results found for: [address] [facility] [[id number]] Collected: 10/23/22 1112      Order Status: Completed Updated: 10/23/22 1116     Narrative:       EXAMINATION:   RETROPERITONEAL ULTRASOUND OF THE KIDNEYS AND URINARY BLADDER     10/23/2022     COMPARISON:   None     HISTORY:   ORDERING SYSTEM PROVIDED HISTORY: ARF   TECHNOLOGIST PROVIDED HISTORY:     Reason for exam:->ARF   What reading provider will be dictating this exam?->CRC     FINDINGS:     Kidneys: The right kidney measures 9.7 cm in length and the left kidney measures 8.9   cm in length. Cortex within normal limits measuring 7 mm on the right and 9   mm on the left. Kidneys demonstrate normal cortical echogenicity. No evidence of   hydronephrosis or intrarenal stones. However, there is cyst lower pole left   kidney measuring 1.6 x 1.4 x 1.1 cm. Impression:       Simple appearing cyst lower pole left kidney. Otherwise, negative ultrasound   of the kidneys. Assessment and Plan:    Acute kidney injury-  In the setting of contrast.  Baseline creatinine 1.0-1.1 mg/dl  FeNa <1%  Renal US- no hydro, simple cyst.   Creatinine 1.6-->2.5->2.0->1.9mg/dl  Continue  IVF   UOP as recorded 1200 ml past 24 hours. Continue IVF  Follow labs    2. HAG Metabolic acidosis with elevated lactic acid-  Off IV HCO3  CO2 20->17->20->21->27->23->19  Follow     3. Hypotension-  In the setting of a-fib RVR  On lopressor for rate control  BP's currently stable. 4. Hyperphosphatemia-  In the setting of MAGGI  Will follow   PO4 5.3-->4.5->3.8->4. [address] [facility] Anaid Members - CNP    Patient examined doing well in no distress   No edema     Renal functions better     Plan as outlined , continue present care     Dr Grant May

## 2022-10-27 NOTE — CARE COORDINATION
SS NOTE: PT WILL NEED A RAPID NEGATIVE COVID TEST THE DAY OF [facility] TO THE NEW SNF. [facility] has accepted pt and will begin [address] today. For a new SNF pt will need a PRECERT, signed [email or web address], current PT and OT notes and SW will need to complete the HENs. SS to continue. DANIELA Hanna.10/27/2022.2:09PM

## 2022-10-27 NOTE — PROGRESS NOTES
NAME: José Miguel Beltre  MR:  [id number]  :   1945  Admit Date:  10/21/2022      This is a face to face encounter with José Miguel Beltre 76 y.o. female on 10/27/22  Elements of this note, including Diagnosis,  Interval History, Past Medical/Surgical/Family/Social Histories, ROS, physical exam, and Assessment and Plan were copied and pasted from Previous. Updates have been made where noted and reflect current exam and medical decision making from the DOS of this encounter. CHIEF COMPLAINT     ID following for   Chief Complaint   Patient presents with    Abdominal Pain     Was seen at [facility] yesterday, not feeling any better at this time, when asked regarding pain, patient states I feel a lump here pointing to scar right mid to lower quadrant, states from previous hernia. HISTORY OF PRESENT ILLNESS     Assessment & Plan   Proctocolitis/perirectal dermatitis   Cons bacteremia- contaminant   transaminitis  [place]      Plan:   Continue  augmentin  Skin care  Can d/c       Pt seen and examined    DOS  10/27/22  In bed   Has no c/o f/c/n/v/d/     Has telesitter in room     Patient is tolerating medications. No reported adverse drug reactions. REVIEW OF SYSTEMS     As stated above in the chief complaint, otherwise negative.   CURRENT MEDICATIONS     Current Facility-Administered Medications:     amoxicillin-clavulanate (AUGMENTIN) 500-125 MG per tablet 1 tablet, 1 tablet, Oral, 2 times per day, MASSIMO Davis - CNS, 1 tablet at 10/27/22 0933    pantoprazole (PROTONIX) tablet 40 mg, 40 mg, Oral, BID AC, Theodore Kowalski, , 40 mg at 10/27/22 0516    dextrose 5 % and 0.45 % sodium chloride infusion, , IntraVENous, Continuous, Sheryl Abarca MD, Last Rate: 50 mL/hr at 10/26/22 2134, Rate Change at 10/26/22 2134    metoprolol succinate (TOPROL XL) extended release tablet 50 mg, 50 mg, Oral, BID, Ruben Mae MD, 50 mg at 10/27/22 0924    isosorbide mononitrate (IMDUR) extended release tablet 30 mg, 30 mg, Oral, Daily, Kailyn Selby MD, 30 mg at 10/27/22 9391    hydrALAZINE (APRESOLINE) tablet 10 mg, 10 mg, Oral, 3 times per day, Kailyn Selby MD, 10 mg at 10/26/22 2119    metoprolol (LOPRESSOR) injection 5 mg, 5 mg, IntraVENous, Q5 Min PRN, [employer], DO, 5 mg at 10/25/22 0340    sodium chloride flush 0.9 % injection 5-40 mL, 5-40 mL, IntraVENous, 2 times per day, [place], DO, 10 mL at 10/26/22 2117    sodium chloride flush 0.9 % injection 5-40 mL, 5-40 mL, IntraVENous, PRN, [place], DO    0.9 % sodium chloride infusion, , IntraVENous, PRN, [place], DO, Last Rate: 10 mL/hr at 10/26/22 1500, New Bag at 10/26/22 1500    heparin flush 100 UNIT/ML injection 100 Units, 1 mL, IntraVENous, 2 times per day, [place], DO    heparin flush 100 UNIT/ML injection 100 Units, 1 mL, IntraCATHeter, PRN, [place], DO    fentaNYL (SUBLIMAZE) injection 25 mcg, 25 mcg, IntraVENous, Q4H PRN, [place], DO, 25 mcg at 10/23/22 1140    apixaban (ELIQUIS) tablet 5 mg, 5 mg, Oral, Daily, [place], DO, 5 mg at 10/27/22 9875    vitamin B-12 (CYANOCOBALAMIN) tablet 1,000 mcg, 1,000 mcg, Oral, Daily, [place], DO, 1,000 mcg at 10/27/22 0934    albuterol (PROVENTIL) nebulizer solution 2.5 mg, 2.5 mg, Nebulization, Q4H PRN, [place], DO    budesonide (PULMICORT) nebulizer suspension 500 mcg, 0.5 mg, Nebulization, BID, [place], DO, 500 mcg at 10/27/22 0844    magnesium sulfate 1000 mg in dextrose 5% 100 mL IVPB, 1,000 mg, IntraVENous, PRN, [place], DO    sodium phosphate 13.05 mmol in sodium chloride 0.9 % 250 mL IVPB, 0.16 mmol/kg, IntraVENous, PRN **OR** sodium phosphate 26.1 mmol in sodium chloride 0.9 % 500 mL IVPB, 0.32 mmol/kg, IntraVENous, PRN, [place], DO    potassium chloride (KLOR-CON M) extended release tablet 40 mEq, 40 mEq, Oral, PRN **OR** potassium bicarb-citric acid (EFFER-K) effervescent tablet 40 mEq, 40 mEq, Oral, PRN, 40 mEq at 10/22/22 0827 **OR** potassium chloride 10 mEq/100 mL IVPB (Peripheral Line), 10 mEq, IntraVENous, PRN, Jere Joy, DO    Arformoterol Tartrate (BROVANA) nebulizer solution 15 mcg, 15 mcg, Nebulization, BID, Jere Joy, DO, 15 mcg at 10/27/22 1218    sodium chloride flush 0.9 % injection 5-40 mL, 5-40 mL, IntraVENous, 2 times per day, Jere Joy, DO, 10 mL at 10/23/22 2140    sodium chloride flush 0.9 % injection 5-40 mL, 5-40 mL, IntraVENous, PRN, Jere Joy, DO    0.9 % sodium chloride infusion, , IntraVENous, PRN, Jere Joy, DO    ondansetron (ZOFRAN-ODT) disintegrating tablet 4 mg, 4 mg, Oral, Q8H PRN **OR** ondansetron (ZOFRAN) injection 4 mg, 4 mg, IntraVENous, Q6H PRN, Jere Joy, DO, 4 mg at 10/23/22 2141    acetaminophen (TYLENOL) tablet 650 mg, 650 mg, Oral, Q6H PRN, 650 mg at 10/26/22 2119 **OR** acetaminophen (TYLENOL) suppository 650 mg, 650 mg, Rectal, Q6H PRN, Jere Joy, DO    sennosides-docusate sodium (SENOKOT-S) 8.6-50 MG tablet 2 tablet, 2 tablet, Oral, BID, Jere Joy, DO, 2 tablet at 10/27/22 9222    polyethylene glycol (GLYCOLAX) packet 17 g, 17 g, Oral, BID, Jere Joy, DO, 17 g at 10/23/22 2140    bisacodyl (DULCOLAX) EC tablet 5 mg, 5 mg, Oral, Daily PRN, Jere Joy, DO    lactulose (CHRONULAC) 10 GM/15ML solution 20 g, 20 g, Oral, BID, Jere Joy, DO, 20 g at 10/23/22 2141    ipratropium-albuterol (DUONEB) nebulizer solution 1 ampule, 1 ampule, Inhalation, Q4H WA, Jere Joy, DO, 1 ampule at 10/27/22 0957    nicotine (NICODERM CQ) 21 MG/24HR 1 patch, 1 patch, TransDERmal, Daily, Jalen Nereida , 1 patch at 10/26/22 0267  PHYSICAL EXAM     BP 96/64   Pulse (!) 106   Temp 97.3 °F (36.3 °C) (Oral)   Resp 13   Ht 5' 6\" (1.676 m)   Wt 208 lb (94.3 kg)   SpO2 96%   BMI 33.57 kg/m²   Temp  Av.6 °F (36.4 °C)  Min: 97.3 °F (36.3 °C)  Max: 98 °F (36.7 °C)   CONSTITUTIONAL:  awake, alert, cooperative, no apparent distress,    ENT:  Normocephalic, without obvious abnormality, atraumatic, sinuses   LUNGS:  No increased work of breathing, diminished to bases On nasal canula. CARDIOVASCULAR:   regular rate and rhythm, normal S1 and S2,   ABDOMEN:    normal bowel sounds, soft, non-distended, non-tender, no masses palpated, no hepatosplenomegally  MUSCULOSKELETAL:  There is no redness, warmth, or swelling of the joints. Full range of motion noted. edema  NEUROLOGIC:  Awake, alert, oriented to name, place and time. Cranial nerves II-XII are grossly intact. SKIN:  normal skin color, texture, turgor and no rashes perirectal area red  PIV     Peripheral Intravenous Line:  Peripheral IV 10/23/22 Left Forearm (Active)   Site Assessment Clean, dry & intact 10/24/22 2000   Line Status Infusing 10/24/22 2000   Line Care Connections checked and tightened 10/24/22 2000   Phlebitis Assessment No symptoms 10/24/22 2000   Infiltration Assessment 0 10/24/22 2000   Alcohol Cap Used Yes 10/24/22 2000   Dressing Status Clean, dry & intact 10/24/22 2000   Dressing Type Transparent 10/24/22 2000   Dressing Intervention Other (Comment) 10/24/22 2000       Peripheral IV 10/24/22 Right Wrist (Active)   Site Assessment Clean, dry & intact 10/24/22 2000   Line Status Blood return noted; Flushed; Infusing 10/24/22 2000   Line Care Connections checked and tightened 10/24/22 2000   Phlebitis Assessment No symptoms 10/24/22 2000   Infiltration Assessment 0 10/24/22 2000   Alcohol Cap Used Yes 10/24/22 2000   Dressing Status Clean, dry & intact 10/24/22 2000   Dressing Type Transparent 10/24/22 2000   Dressing Intervention Other (Comment) 10/24/22 2000       Peripheral IV 10/25/22 Left; Anterior Cephalic (Active)   Site Assessment Clean, dry & intact 10/25/22 1248   Line Status Blood return noted;Brisk blood return;Flushed;Capped;Normal saline locked 10/25/22 1248   Phlebitis Assessment No symptoms 10/25/22 1248   Infiltration Assessment 0 10/25/22 1248   Alcohol Cap Used Yes 10/25/22 1248   Dressing Status New dressing applied;Clean, dry & intact 10/25/22 1248   Dressing Type Transparent 10/25/22 1248   Dressing Intervention New 10/25/22 1248       Drain(s):  Urinary Catheter 10/23/22 Vargas (Active)   $ Urethral catheter insertion $ Not inserted for procedure 10/23/22 1030   Catheter Indications Urinary retention (acute or chronic), continuous bladder irrigation or bladder outlet obstruction; Need for fluid volume management of the critically ill patient in a critical care setting 10/24/22 2000   Site Assessment No urethral drainage 10/24/22 2000   Urine Color Sally 10/24/22 2000   Urine Appearance Clear 10/24/22 2000   Collection Container Standard 10/24/22 2000   Securement Method Leg strap 10/24/22 2000   Catheter Care Completed Yes 10/24/22 2000   Catheter Best Practices  Drainage tube clipped to bed;Catheter secured to thigh; Bag below bladder;Bag not on floor; Lack of dependent loop in tubing;Drainage bag less than half full 10/24/22 2000   Status Draining 10/24/22 2000   Output (mL) 450 mL 10/25/22 0625     DIAGNOSTIC RESULTS   Radiology:    Recent Labs     10/25/22  0340 10/26/22  0321 10/27/22  0613   WBC 9.7 9.3 9.3   RBC 4.51 4.26 4.55   HGB 11.8 11.4* 12.1   HCT 38.3 36.8 39.6   MCV 84.9 86.4 87.0   MCH 26.2 26.8 26.6   MCHC 30.8* 31.0* 30.6*   RDW 17.9* 18.0* 17.9*    220 205   MPV 11.2 11.2 11.3       Recent Labs     10/25/22  0340 10/25/22  1009 10/26/22  0321 10/26/22  1021 10/27/22  0613      < > 138 136 138   K 4.4   < > 4.2 4.4 4.6      < > 104 104 104   CO2 22   < > 23 22 19*   BUN 88*   < > 79* 75* 77*   CREATININE 2.4*   < > 2.0* 2.0* 1.9*   GLUCOSE 112*   < > 152* 161* 138*   PROT 5.5*  --  5.2*  --  5.2*   LABALBU 3.3*  --  3.1*  --  3.3*   CALCIUM 8.4*   < > 8.2* 8.3* 8.4*   BILITOT 1.1  --  0.7  --  0.9   ALKPHOS 86  --  86  --  87   *  --  156*  --  84*   ALT 83*  --  123*  --  109*    < > = values in this interval not displayed.        Recent Labs     10/25/22  0340 10/26/22  0321 10/27/22  0613   * 156* 84*   ALT 83* 123* 109*       Lab Results   Component Value Date/Time    CHOL 140 10/22/2022 05:09 AM    TRIG 83 10/22/2022 05:09 AM    HDL 21 10/22/2022 05:09 AM    LDLCALC 102 10/22/2022 05:09 AM    LABVLDL 17 10/22/2022 05:09 AM     Microbiology:   No results for input(s): COVID19 in the last 72 hours. Lab Results   Component Value Date/Time    BC 24 Hours no growth 10/24/2022 02:22 PM    [facility]  10/21/2022 06:45 AM     This organism was isolated in one set. Susceptibility testing is not routinely done as this  organism frequently represents skin contamination. Additional testing can be ordered by calling the  Microbiology Department. BLOODCULT2 24 Hours no growth 10/24/2022 02:22 PM    BLOODCULT2 5 Days no growth 10/21/2022 10:12 AM    [facility] Staphylococcus coagulase-negative 10/21/2022 06:45 AM           Imaging and labs were reviewed. [facility] was informed of their diagnosis, indications, risks and benefits of treatment. [facility] had the opportunity to ask questions. All questions were answered. Thank you for involving me in the care of [facility]. Please do not hesitate to call for any questions or concerns.     Electronically signed by Janet Yarbrough MD on 10/27/2022 at 12:40 PM

## 2022-10-27 NOTE — PLAN OF CARE
Problem: Pain  Goal: Verbalizes/displays adequate comfort level or baseline comfort level  Outcome: Progressing     Problem: Safety - Adult  Goal: Free from fall injury  Outcome: Progressing     Problem: ABCDS Injury Assessment  Goal: Absence of physical injury  Outcome: Progressing     Problem: Discharge Planning  Goal: Discharge to home or other facility with appropriate resources  Outcome: Progressing     Problem: Skin/Tissue Integrity  Goal: Absence of new skin breakdown  Description: 1. Monitor for areas of redness and/or skin breakdown  2. Assess vascular access sites hourly  3. Every 4-6 hours minimum:  Change oxygen saturation probe site  4. Every 4-6 hours:  If on nasal continuous positive airway pressure, respiratory therapy assess nares and determine need for appliance change or resting period.   Outcome: Progressing

## 2022-10-27 NOTE — PROGRESS NOTES
Physical Therapy Treatment Note/Plan of Care    Room #:  3531/8422-84  Patient Name: Juan Grace  YOB: 1945  MRN: [id number]    Date of Service: 10/27/2022     Tentative placement recommendation: Home Health Physical Therapy  or Subacute rehab  Equipment recommendation: To be determined      Evaluating Physical Therapist: Kleber Ramríez, [address] [facility] #[id number]      Specific Provider Orders/Date/Referring Provider :   10/21/22 0600    PT eval and treat  Start:  10/21/22 0600,   End:  10/21/22 0600,   ONE TIME,   Standing Count:  1 Occurrences,   R         Alex Pastel, DO     Admitting Diagnosis:   Proctocolitis [K52.9]  Diverticulosis [K57.90]  Acute kidney injury ([facility] [address] [N17.9]  Idiopathic proctocolitis, other complication ([facility] [address] [W09.720]      Surgery: none  Visit Diagnoses         Codes    Proctocolitis    -  Primary K52.9    Diverticulosis     K57.90    Acute kidney injury ([facility] [address]     N17.9            Patient Active Problem List   Diagnosis    Generalized abdominal pain    Proctocolitis        ASSESSMENT of Current Deficits Patient exhibits decreased strength, balance, and endurance impairing functional mobility, transfers, gait distance, and tolerance to activity. Patient agreeable to ambulation today, however adamant on getting into wheelchair to get out of room. Patient amb to and from wheelchair with mod assist, cues for safety, sequencing and pacing due to patient being impulsive, unsteady and unsafe. Patient abruptly sat into wheelchair and on edge of bed despite cues. Patient requires inc time to perform tasks due to shortness of breath and some self limitation. Max encouragement and education on importance of mobility and needing rehab for patient to return to baseline prior to discharge/return home. The patient will benefit from continued skilled therapy to increase strength and improve balance for safe functional mobility, to decrease risk of falls, and to meet goals at discharge.         PHYSICAL THERAPY  PLAN OF CARE       Physical therapy plan of care is established based on physician order,  patient diagnosis and clinical assessment    Current Treatment Recommendations:    -Bed Mobility: Lower extremity exercises   -Sitting Balance: Incorporate reaching activities to activate trunk muscles   -Standing Balance: Perform strengthening exercises in standing to promote motor control with or without upper extremity support   -Transfers: Provide instruction on proper hand and foot position for adequate transfer of weight onto lower extremities and use of gait device if needed and Cues for hand placement, technique and safety. Provide stabilization to prevent fall   -Gait: Gait training and Standing activities to improve: base of support, weight shift, weight bearing    -Endurance: Utilize Supervised activities to increase level of endurance to allow for safe functional mobility including transfers and gait   -Stairs: Stair training with instruction on proper technique and hand placement on rail    PT long term treatment goals are located in below grid    Patient and or family understand(s) diagnosis, prognosis, and plan of care. Frequency of treatments: Patient will be seen  daily.          Prior Level of Function: Patient ambulated with cane   Rehab Potential: good  for baseline    Past medical history:   Past Medical History:   Diagnosis Date    Hypertension     Lymphoma ([facility] [address]     remission since 2015     Past Surgical History:   Procedure Laterality Date    APPENDECTOMY      CHOLECYSTECTOMY, OPEN      HYSTERECTOMY (CERVIX STATUS UNKNOWN)      INCISIONAL HERNIA REPAIR         SUBJECTIVE:    Precautions:  falls , impulsive, poor safety awareness    Social history: Patient lives with daughter in a two story home resides first  with 3 steps  to enter with Rail  Tub shower grab bars    Equipment owned: Wheelchair, Pownce, and [address],      [address] [facility]  Mobility Inpatient   How much difficulty turning over in bed?: A Little  How much difficulty sitting down on / standing up from a chair with arms?: A Lot  How much difficulty moving from lying on back to sitting on side of bed?: A Little  How much help from another person moving to and from a bed to a chair?: A Lot  How much help from another person needed to walk in hospital room?: A Lot  How much help from another person for climbing 3-5 steps with a railing?: Total  AM-PAC Inpatient Mobility Raw Score : 13  AM-PAC Inpatient T-Scale Score : 36.74  Mobility Inpatient CMS 0-100% Score: 64.91  Mobility Inpatient CMS G-Code Modifier : CL    Nursing cleared patient for PT treatment. Patient states she wanted to get into a wheelchair to get out of room, agreeable to walking to wheelchair. OBJECTIVE;   Initial Evaluation  Date: 10/21/2022 Treatment Date:  10/27/2022       Short Term/ Long Term   Goals   Was pt agreeable to Eval/treatment? Yes yes To be met in 4 days   Pain level   4/10  Abdominal pain No number assigned    Bed Mobility    Rolling: Minimal assist of 1    Supine to sit: Minimal assist of 1    Sit to supine: Minimal assist of 1    Scooting: Minimal assist of 1   Rolling: Supervision    Supine to sit: Minimal assist of 1   Sit to supine: Minimal assist of 1   Scooting: Supervision     Rolling: Independent    Supine to sit: Independent    Sit to supine: Independent    Scooting: Independent     Transfers Sit to stand: Minimal assist of 1 from chair and EOB Sit to stand: Moderate assist of 1 cues for hand placement and safety x multiple reps from [employer] and bedside      Sit to stand: Independent    Ambulation     2x15 feet using  wheeled walker with Minimal assist of 1   for walker control, balance, upright, and safety, IV pole management  2x8 feet using  wheeled walker with Moderate assist of 1   cues for sequencing, safety, pacing, and line management due to patient being impulsive.      50 feet using  least restrictive device with Modified Independent    Stair negotiation: ascended and descended   Not assessed     3 steps with HR   ROM Within functional limits    Increase range of motion 10% of affected joints    Strength BUE:  refer to OT geoff  RLE:  4-/5  LLE:  4-/5  Increase strength in affected mm groups by 1/3 grade   Balance Sitting EOB:  good -  Dynamic Standing:  fair + wheeled walker   Sitting EOB: fair+   Dynamic Standing: fair - flexed posture with wheeled walker     Sitting EOB:  good   Dynamic Standing: good      Patient is Alert & Oriented x person, place, time, and situation and follows directions    Sensation:  Patient  denies numbness/tingling   Edema:  no   Endurance: fair      Vitals:  2 liters nasal cannula   Blood Pressure at rest  Blood Pressure during session    Heart Rate at rest  Heart Rate during session    SPO2 at rest %  SPO2 during session 95-99%     Patient education  Patient educated on role of Physical Therapy, risks of immobility, safety and plan of care, energy conservation,  importance of mobility while in hospital , importance and purpose of adaptive device and adjusted to proper height for the patient. , safety , and O2 line management and safety      Patient response to education:   Pt verbalized understanding Pt demonstrated skill Pt requires further education in this area   Yes Partial Yes      Treatment:  Patient practiced and was instructed/facilitated in the following treatment: Patient assisted to edge of bed, equipment lined up and patient stood to amb to [place] [id number]. Patient taken into hallway and back to room where she amb from [place] [address] to bedside. Patient unsafe and impulsive with ambulation and transfers despite education and cues given. Patient assisted back to supine position in bed. Therapeutic Exercises:  not performed reps. At end of session, patient in bed with alarm call light and phone within reach,  all lines and tubes intact, nursing notified.       Patient would benefit from continued skilled Physical Therapy to improve functional independence and quality of life.          Patient's/ family goals   home    Time in  1040  Time out 1135    Total Treatment Time  55 minutes      CPT codes:  Therapeutic activities (88191)   40 minutes  3 unit(s)  Gait Training (31895) 15 minutes 1 unit(s)    Germain Post, [facility]  #[id number]

## 2022-10-27 NOTE — CARE COORDINATION
SS NOTE: PT WILL NEED A RAPID NEGATIVE COVID TEST THE DAY OF [facility] TO THE NEW SNF. Pt has a telesitter due to trying to get up out of bed. Pt is on room air. Pt had an ECHO with and EF of 15%. She has a Midline. Pt is on oral Augmentin. PT / OT are on- yesterday ambulation was not assessed and it took max assist of 2 to transfer her. SW met with pt, her dtr and granddter in pt's room this day. They have chosen the following SNFs: [facility] [place] [address] and [facility] SN[facility] ARE FULL, [facility] will meet with pt today to address the NO SMOKING policy at their facility - AWAITING [facility] or [address] [place] [address]. LUZ MARINA completed a referral to [facility] and await their decision. For a new SNF pt will need a PRECERT, signed NIKKI, current PT and OT notes and SW will need to complete the HENs. SS to continue. DANIELA Quiñonez.10/27/2022.10:56AM

## 2022-10-27 NOTE — PROGRESS NOTES
Vargas removed at 1030. Pt due to void at 1630.  Pt got up to bedside commode 1630 and urinated 400 ml

## 2022-10-27 NOTE — PROGRESS NOTES
Patient  tolerated the plan of care well today. She continues to be confused at times. New IV placed see avatar.   Plan to go to SNF

## 2022-10-27 NOTE — PROGRESS NOTES
Internal Medicine Progress Note    PRE[facility]    Marbella Shin. Nadine Tucker, ONEIL Owens D.O., NANNETTE Rose, MSN, APRN, NP-C  Maria Luisa Elderr. Eric Allen, MSN, APRN-CNP     Primary Care Physician: Prachi Cano MD   Admitting Physician:  Ami Saul DO  Admission date and time: 10/21/2022  4:18 AM    Room:  [address] [place] [address]  Admitting diagnosis: Proctocolitis [K52.9]  Diverticulosis [K57.90]  Acute kidney injury [facility]) [N17.9]  Idiopathic proctocolitis, other complication [facility]) [P85.089]    Patient Name: Montana Lindo  MRN: [id number]    Date of Service: 10/27/2022     Subjective:  Ana Salgado is a 76 y.o. female who was seen and examined today,10/27/2022, at the bedside. Patient is anxious for discharge; discussed that that social work is setting up discharge plans to skilled nursing facility. States she wants to go home. Discussed that as per review of  note the patient's daughter and granddaughter have already been looking into placement at skilled nursing facility for further rehabilitation. She then states that she supposes that she has to do what ever they say. .  Echocardiogram results were reviewed, she is still refusing Life Vest stating \"I'm 76years old, I don't need it\". Review of System:   Constitutional:   Denied fever, chills, weight loss, weight gain, fatigue, and weakness. HEENT:   Denies ear pain, sore throat, sinus or eye problems. Maintained on nasal cannula oxygen 2LPM.   Cardiovascular:   Denies chest pain, chest discomfort, or palpitations. Respiratory:   Endorses shortness of breath on exertion, none at this time, endorses a non-productive cough. States she has a CPAP at home but does not have home oxygen. Gastrointestinal:   Denied nausea, vomiting, diarrhea, or constipation. Still tolerating diet. Genitourinary:    Denies dysuria. Voiding without difficulty.   Extremities:   Denies lower extremity swelling, edema or cyanosis. Neurology:    Denies any headache or focal neurological deficits, admits to generalized weakness and deconditioning. Psch:   Denies being anxious or depressed. Musculoskeletal:    Denies  myalgias, joint complaints or back pain. Integumentary:   Denies any rashes, ulcers, or excoriations. Denies bruising. Hematologic/Lymphatic:  Denies bruising or bleeding. Physical Exam:  I/O this shift:  In: 120 [P.O.:120]  Out: -     Intake/Output Summary (Last 24 hours) at 10/27/2022 1441  Last data filed at 10/27/2022 1411  Gross per 24 hour   Intake 2319.97 ml   Output 1000 ml   Net 1319.97 ml   I/O last 3 completed shifts: In: 9971 [P.O.:700; I.V.:1740]  Out: 1800 [Urine:1800]  Patient Vitals for the past 96 hrs (Last 3 readings):   Weight   10/27/22 0600 208 lb (94.3 kg)   10/26/22 0600 205 lb 1.6 oz (93 kg)   10/25/22 0259 205 lb (93 kg)     Vital Signs:   Blood pressure 96/64, pulse (!) 106, temperature 97.3 °F (36.3 °C), temperature source Oral, resp. rate 13, height 5' 6\" (1.676 m), weight 208 lb (94.3 kg), SpO2 96 %. General appearance:  Appropriate and oriented to person, place, time, and situation. Head:  Normocephalic. No masses, lesions or tenderness. Eyes:  PERRLA. EOMI. Sclera clear. Impaired vision. Eyeglasses on. ENT:  Ears normal. Mucosa normal.  Nasal cannula oxygen is in place. Neck:    Supple. Trachea midline. No thyromegaly. No JVD. No bruits. Heart:    Irregularly irregular rate and rhythm. Atrial fibrillation on the monitor. Systolic murmur. Lungs:    Diminished throughout. Fine bibasilar rales. No rhonchi. No wheeze. Abdomen:   Soft, rounded, obese, bowel sounds present in all four quadrants. No tenderness noted on examination. Extremities:    Peripheral pulses present. No peripheral edema. No ulcers. No cyanosis. No clubbing. Neurologic:    No focal deficit. Cranial nerves grossly intact. No focal weakness.   Psych: Appropriate affect, no mention of suicidal/homicidal/self-harm ideations. Musculoskeletal:   Spine ROM normal. Muscular strength intact. Gait not assessed. Integumentary:  No rashes  Skin normal color and texture.   Genitalia/Breast:  Deferred    Medication:  Scheduled Meds:   miconazole   Topical BID    amoxicillin-clavulanate  1 tablet Oral 2 times per day    pantoprazole  40 mg Oral BID AC    metoprolol succinate  50 mg Oral BID    isosorbide mononitrate  30 mg Oral Daily    hydrALAZINE  10 mg Oral 3 times per day    sodium chloride flush  5-40 mL IntraVENous 2 times per day    heparin flush  1 mL IntraVENous 2 times per day    apixaban  5 mg Oral Daily    vitamin B-12  1,000 mcg Oral Daily    budesonide  0.5 mg Nebulization BID    Arformoterol Tartrate  15 mcg Nebulization BID    sodium chloride flush  5-40 mL IntraVENous 2 times per day    sennosides-docusate sodium  2 tablet Oral BID    polyethylene glycol  17 g Oral BID    lactulose  20 g Oral BID    ipratropium-albuterol  1 ampule Inhalation Q4H WA    nicotine  1 patch TransDERmal Daily     Continuous Infusions:   dextrose 5 % and 0.45 % NaCl 50 mL/hr at 10/26/22 2134    sodium chloride 10 mL/hr at 10/26/22 1500    sodium chloride         Objective Data:  CBC with Differential:    Lab Results   Component Value Date/Time    WBC 9.3 10/27/2022 06:13 AM    RBC 4.55 10/27/2022 06:13 AM    HGB 12.1 10/27/2022 06:13 AM    HCT 39.6 10/27/2022 06:13 AM     10/27/2022 06:13 AM    MCV 87.0 10/27/2022 06:13 AM    MCH 26.6 10/27/2022 06:13 AM    MCHC 30.6 10/27/2022 06:13 AM    RDW 17.9 10/27/2022 06:13 AM    LYMPHOPCT 13.2 10/27/2022 06:13 AM    MONOPCT 8.4 10/27/2022 06:13 AM    BASOPCT 0.1 10/27/2022 06:13 AM    MONOSABS 0.78 10/27/2022 06:13 AM    LYMPHSABS 1.23 10/27/2022 06:13 AM    EOSABS 0.04 10/27/2022 06:13 AM    BASOSABS 0.01 10/27/2022 06:13 AM     CMP:    Lab Results   Component Value Date/Time     10/27/2022 06:13 AM    K 4.6 10/27/2022 06:13 AM    K 3.3 07/06/2022 08:30 AM     10/27/2022 06:13 AM    CO2 19 10/27/2022 06:13 AM    BUN 77 10/27/2022 06:13 AM    CREATININE 1.9 10/27/2022 06:13 AM    GFRAA >60 07/06/2022 08:30 AM    LABGLOM 26 10/27/2022 06:13 AM    GLUCOSE 138 10/27/2022 06:13 AM    PROT 5.2 10/27/2022 06:13 AM    LABALBU 3.3 10/27/2022 06:13 AM    CALCIUM 8.4 10/27/2022 06:13 AM    BILITOT 0.9 10/27/2022 06:13 AM    ALKPHOS 87 10/27/2022 06:13 AM    AST 84 10/27/2022 06:13 AM     10/27/2022 06:13 AM       Assessment:  Acute proctocolitis in the setting of severe constipation   Radiographic evidence of duodenitis with the patient refusing EGD evaluation  Coagulase-negative Staphylococcus bacteremia x1 bottle compatible with contamination  Acute renal failure on CKD stage III that is multifactorial in nature  Atrial fibrillation with intermittent periods of rapid ventricular response  Echocardiographic evidence of severe cardiomyopathy with ejection fraction of 10 to 15%  History of non-Hodgkin's lymphoma currently in remission  COPD with ongoing tobacco abuse  Hyportension  Early dementia    Plan:   Continue antibiotic therapy; had midline in place  Has been refusing glycolax/lactulose/senokot but did take a dose of senokot today per medication review and per I&O charting last BM 10/26/22 0608. BUN/creatinine remain elevated; admission date 58/1.6  today 77/1.9  Eliquis for VTE prophlyaxis  Protonix for GI prophlyaxis  Awaiting pre-certification for [address]  PT/OT  Will need negative rapid COVID test on day of discharge  [facility] cardiology is following the patient recommendations of been reviewed. Patient does have severe systolic heart failure with LVEF of 10 to 15%. LifeVest was offered to the patient and she is at risk for sudden cardiac death however she has declined.   Continue current medications and see orders for further plan of care    More than 50% of my  time was spent at the bedside counseling/coordinating care with the patient and/or family with face to face contact. This time was spent reviewing notes and laboratory data as well as instructing and counseling the patient. Time I spent with the family or surrogate(s) is included only if the patient was incapable of providing the necessary information or participating in medical decisions. I also discussed the differential diagnosis and all of the proposed management plans with the patient and individuals accompanying the patient. Johny Jon requires this high level of physician care and nursing on the IMC/Telemetry unit due the complexity of decision management and chance of rapid decline or death. Continued cardiac monitoring and higher level of nursing are required. I am readily available for any further decision-making and intervention. The patient was seen, examined and then discussed with Dr. Coretta Colon. Gabriela Cornejo, MASSIMO - CNP  10/27/2022  2:41 PM        I saw and evaluated the patient. I agree with the findings and the plan of care as documented in Gabriela Cornejo NP-C's  note.     Tianna Hurst DO, D.O., FACOI  5:27 PM  10/27/2022

## 2022-10-28 NOTE — PLAN OF CARE
Problem: Pain  Goal: Verbalizes/displays adequate comfort level or baseline comfort level  Outcome: Progressing     Problem: Safety - Adult  Goal: Free from fall injury  Outcome: Progressing  Flowsheets (Taken 10/28/2022 1037)  Free From Fall Injury: Instruct family/caregiver on patient safety     Problem: ABCDS Injury Assessment  Goal: Absence of physical injury  Outcome: Progressing  Flowsheets (Taken 10/28/2022 1037)  Absence of Physical Injury: Implement safety measures based on patient assessment     Problem: Discharge Planning  Goal: Discharge to home or other facility with appropriate resources  Outcome: Progressing  Flowsheets (Taken 10/28/2022 0900)  Discharge to home or other facility with appropriate resources: Identify barriers to discharge with patient and caregiver     Problem: Skin/Tissue Integrity  Goal: Absence of new skin breakdown  Description: 1. Monitor for areas of redness and/or skin breakdown  2. Assess vascular access sites hourly  3. Every 4-6 hours minimum:  Change oxygen saturation probe site  4. Every 4-6 hours:  If on nasal continuous positive airway pressure, respiratory therapy assess nares and determine need for appliance change or resting period.   Outcome: Progressing

## 2022-10-28 NOTE — PROGRESS NOTES
met with Patient who stated she is going to [facility] for further treatment. Pt states she is ready to go and is looking forward to getting stronger.  provided active listening, emotional support, affirmation, prayer, scripture , and supportive presence.

## 2022-10-28 NOTE — PROGRESS NOTES
Patient has removed or infiltrated multiple IV sites today. Patient redirected for using middle finger with staff. Patient pulled out IV line, uncooperative with first attempted to reinsert line. Second nurse attempted line, without success. Charge nurse notified. Charge nurse reported that patient unable to get IV line at this time. Author requested ultrasound guided. Patient remains without IV at this time. Oxygenation remains above 90 percent on ROOM air. Dr also aware that patient Metoprolol and hydralazine has been held due to soft pressures. Please nursing order.

## 2022-10-28 NOTE — PROGRESS NOTES
Comprehensive Nutrition Assessment    Type and Reason for Visit:  Initial, RD Nutrition Re-Screen/LOS    Nutrition Recommendations/Plan:   Continue current diet   Ordered Ensure Enlive BID too optimize nutrition status & monitor     Malnutrition Assessment:  Malnutrition Status:  No malnutrition (10/28/22 0805)    Context:  Acute Illness     Findings of the 6 clinical characteristics of malnutrition:  Energy Intake:  Mild decrease in energy intake (Comment)  Weight Loss:  No significant weight loss     Body Fat Loss:  No significant body fat loss     Muscle Mass Loss:  No significant muscle mass loss    Fluid Accumulation:  No significant fluid accumulation     Strength:  Not Performed    Nutrition Assessment:    Pt admits w/ abd pain,  Dx: Proctocolitis/ MAGGI PMH: COPD, HTN, non-Hodgkin's lymphoma (in remission). Noted 10/25 pt refusing EGD, pt fixated on eating, diet was reinstituted. Pt meal intake is poor ~ 26%. Will ordernONS to optimize nutrition status. Nutrition Related Findings:    A/O x3, distended/soft/nontender abd +BS, Diarrhea, flatus,  I/O +5.0L,  no edema, Elevated renal labs, Elevated LFT's. Wound Type: None       Current Nutrition Intake & Therapies:    Average Meal Intake: 0%, 1-25%, 51-75%  Average Supplements Intake: None Ordered  ADULT DIET; Regular  ADULT ORAL NUTRITION SUPPLEMENT; Lunch, Dinner; Standard High Calorie/High Protein Oral Supplement    Anthropometric Measures:  Height: 5' 6\" (167.6 cm)  Ideal Body Weight (IBW): 130 lbs (59 kg)    Admission Body Weight: 196 lb (88.9 kg) (10/24 bed)  Current Body Weight: 211 lb (95.7 kg) (10/28 bed), 162.3 % IBW.     Current BMI (kg/m2): 34.1  Usual Body Weight: 171 lb (77.6 kg) (7/2022 only acccuate wt hx in EMR)  % Weight Change (Calculated): 23.4  Weight Adjustment For: No Adjustment       Estimated Daily Nutrient Needs:  Energy Requirements Based On: Formula  Weight Used for Energy Requirements: Admission  Energy (kcal/day): 0858-4175  Weight Used for Protein Requirements: Ideal  Protein (g/day): 60-70 (1-1.2 gm/kg review renal labs)  Method Used for Fluid Requirements: 1 ml/kcal  Fluid (ml/day): 7952-5116    Nutrition Diagnosis:   Inadequate oral intake related to altered GI structure as evidenced by intake 26-50%, diarrhea    Nutrition Interventions:   Food and/or Nutrient Delivery: Continue Current Diet, Start Oral Nutrition Supplement  Nutrition Education/Counseling: No recommendation at this time  Coordination of Nutrition Care: Continue to monitor while inpatient     Goals:     Goals: PO intake 50% or greater     Nutrition Monitoring and Evaluation:   Behavioral-Environmental Outcomes: None Identified  Food/Nutrient Intake Outcomes: Food and Nutrient Intake, Supplement Intake  Physical Signs/Symptoms Outcomes: Biochemical Data, Diarrhea, GI Status, Fluid Status or Edema, Weight, Skin, Nutrition Focused Physical Findings    Discharge Planning:     Too soon to determine     Cooper Bill RD  Contact: [phone]

## 2022-10-28 NOTE — CARE COORDINATION
SS NOTE: PT WILL NEED A RAPID NEGATIVE COVID TEST THE DAY OF [facility] TO THE NEW SNF. Pt has a tele sitter. Per nursing pt was a bit more confused today than [facility] accepted pt andl began [facility] today. For a new SNF pt will need a PRECERT, signed NIKKI, current PT and OT notes and SW will need to complete the HENs. SS to continue. DANIELA Blackwell.10/28/2022.10:08AM

## 2022-10-28 NOTE — PROGRESS NOTES
Physician Progress Note      PATIENT:               Faith Burk  CSN #:                  [id number]  :                       1945  ADMIT DATE:       10/21/2022 4:18 AM  DISCH DATE:  RESPONDING  PROVIDER #:        Cindy Rutherford MD          QUERY TEXT:    Patient admitted with acute proctocolitis;  noted documentation of   cardiomyopathy in hospitalist note on 10/26/22. If possible, please document   in progress notes and discharge summary further specificity regarding the type   of cardiomyopathy:    The medical record reflects the following:  Risk Factors: 76 yof PAF HTN COPD acute renal failure cardiomyopathy  Clinical Indicators: echo 10/25/22  EF est 15%  mildly dilated right ventricle    left atrium severe aortic stenosis; CXR 10/21: Moderate cardiomegaly. Mild   perihilar pulmonary edema. no effusion. CXR 10/26/22: mildly suspected   atelectasis and or infiltrate at right base  heart is enlarged  pulmonary   vascularity is borderline congested. pneumothorax. per Dr Carly Carias 10/26/22:   \"echocardiogram is now indicating severe cardiomyopathy. \"  Treatment: CXR echo cardiology nephrology ID consult   metoprolol hydralazine   Life vest declined by patient    Thank you  Kay Riggs BSN RN CDS   M-F 6am-2pm  Options provided:  -- Dilated cardiomyopathy  -- Drug-induced cardiomyopathy  -- Hypertensive cardiomyopathy  -- Hypertrophic cardiomyopathy  -- Idiopathic cardiomyopathy  -- Ischemic cardiomyopathy  -- Obstructive cardiomyopathy  -- Restrictive cardiomyopathy  -- Valvular cardiomyopathy  -- Other - I will add my own diagnosis  -- Disagree - Not applicable / Not valid  -- Disagree - Clinically unable to determine / Unknown  -- Refer to Clinical Documentation Reviewer    PROVIDER RESPONSE TEXT:    Provider is clinically unable to determine a response to this query.   CMP - ischemic vs Non-ischemic, pending pending her further testing    Query created by: Judah Hassan on 10/28/2022 7:24 AM      Electronically signed by:  Vashti Akers MD 10/28/2022 7:27 AM

## 2022-10-28 NOTE — PROGRESS NOTES
Nephrology Progress Note  [facility]      From consult 10/24/22-  HPI:   Sally Arteaga is a 76year old female who came to the hospital 10/21 for evaluation of lower abdominal pain and constipation. She was admitted for pain management and mild proctocolitis. She has a PMH of non-Hodgkin's lymphoma in remission since 2015. During her work up she had a CT with IV contrast on 10/21. At that time her creatinine was 1.5 mg/dl and has since risen to 2.5 mg/dl today and renal consult was requested. She has also been dosed with Vancomycin, awaiting Vanc level Zero eosinophils in urine  It appears she was seen in Jan 2022 at [facility] for MAGGI. Baseline creatinine from 7/2022 is 1.0-1.1 mg/dl. She is confused and is unable to provide any meaningful history. 10/28/22- awake and alert. She is more confused today and has a telesitter.      PMH:    Past Medical History:   Diagnosis Date    Hypertension     Lymphoma ([address]     remission since 2015       Patient Active Problem List   Diagnosis    Generalized abdominal pain    Proctocolitis       Meds:     miconazole   Topical BID    amoxicillin-clavulanate  1 tablet Oral 2 times per day    pantoprazole  40 mg Oral BID AC    metoprolol succinate  50 mg Oral BID    isosorbide mononitrate  30 mg Oral Daily    hydrALAZINE  10 mg Oral 3 times per day    sodium chloride flush  5-40 mL IntraVENous 2 times per day    heparin flush  1 mL IntraVENous 2 times per day    apixaban  5 mg Oral Daily    vitamin B-12  1,000 mcg Oral Daily    budesonide  0.5 mg Nebulization BID    Arformoterol Tartrate  15 mcg Nebulization BID    sodium chloride flush  5-40 mL IntraVENous 2 times per day    sennosides-docusate sodium  2 tablet Oral BID    polyethylene glycol  17 g Oral BID    lactulose  20 g Oral BID    ipratropium-albuterol  1 ampule Inhalation Q4H WA    nicotine  1 patch TransDERmal Daily        dextrose 5 % and 0.45 % NaCl Stopped (10/28/22 0046)    sodium chloride 10 mL/hr at 10/26/22 1500 sodium chloride         Meds prn:     metoprolol, sodium chloride flush, sodium chloride, heparin flush, fentanNYL, albuterol, magnesium sulfate, sodium phosphate IVPB **OR** sodium phosphate IVPB, potassium chloride **OR** potassium alternative oral replacement **OR** potassium chloride, sodium chloride flush, sodium chloride, ondansetron **OR** ondansetron, acetaminophen **OR** acetaminophen, bisacodyl    Meds prior to admission:     No current facility-administered medications on file prior to encounter. Current Outpatient Medications on File Prior to Encounter   Medication Sig Dispense Refill    albuterol sulfate HFA (VENTOLIN HFA) 108 (90 Base) MCG/ACT inhaler Inhale 2 puffs into the lungs every 6 hours as needed for Wheezing      vitamin B-12 (CYANOCOBALAMIN) 1000 MCG tablet Take 1,000 mcg by mouth daily      metoprolol succinate (TOPROL XL) 50 MG extended release tablet Take 50 mg by mouth nightly      aspirin 81 MG EC tablet Take 81 mg by mouth daily      apixaban (ELIQUIS) 5 MG TABS tablet Take 5 mg by mouth daily      melatonin 5 MG TABS tablet Take 5 mg by mouth nightly      methylPREDNISolone (MEDROL) 4 MG tablet Take 2 mg by mouth 3 times daily         Allergies:    Bactrim [sulfamethoxazole-trimethoprim], Ibuprofen, and Codeine    Social History:     reports that she has been smoking. She has been smoking an average of 2 packs per day. She has never used smokeless tobacco. She reports that she does not drink alcohol and does not use drugs. Family History:     History reviewed. No pertinent family history.     ROS:     All pertinent + discussed in HPI  All other sx negative     Physical Exam:      Patient Vitals for the past 24 hrs:   BP Temp Temp src Pulse Resp SpO2 Height Weight   10/28/22 0727 109/78 97.3 °F (36.3 °C) Oral (!) 105 20 99 % -- --   10/28/22 0706 -- -- -- -- -- -- 5' 6\" (1.676 m) --   10/28/22 0458 -- -- -- -- -- -- -- 211 lb (95.7 kg)   10/28/22 0434 (!) 103/58 97.6 °F (36.4 Negative 12/13/2017 04:10 PM       Lab Results   Component Value Date/Time    NICK Parks 10/21/2022 06:31 AM       No components found for: URIC    No results found for: [facility] [[id number]] Collected: 10/23/22 1112      Order Status: Completed Updated: 10/23/22 1116     Narrative:       EXAMINATION:   RETROPERITONEAL ULTRASOUND OF THE KIDNEYS AND URINARY BLADDER     10/23/2022     COMPARISON:   None     HISTORY:   ORDERING SYSTEM PROVIDED HISTORY: ARF   TECHNOLOGIST PROVIDED HISTORY:     Reason for exam:->ARF   What reading provider will be dictating this exam?->CRC     FINDINGS:     Kidneys: The right kidney measures 9.7 cm in length and the left kidney measures 8.9   cm in length. Cortex within normal limits measuring 7 mm on the right and 9   mm on the left. Kidneys demonstrate normal cortical echogenicity. No evidence of   hydronephrosis or intrarenal stones. However, there is cyst lower pole left   kidney measuring 1.6 x 1.4 x 1.1 cm. Impression:       Simple appearing cyst lower pole left kidney. Otherwise, negative ultrasound   of the kidneys. Assessment and Plan:    Acute kidney injury-  In the setting of contrast.  Baseline creatinine 1.0-1.1 mg/dl  FeNa <1%  Renal US- no hydro, simple cyst.   Creatinine 1.6-->2.5->2.0->1.9->2.1mg/dl  Continue  IVF   UOP as recorded 850 ml past 24 hours. Continue IVF  Follow labs    2. HAG Metabolic acidosis with elevated lactic acid-  Off IV HCO3  CO2 20->17->20->21->27->23->19->21  Follow     3. Hypotension-  In the setting of a-fib RVR  On lopressor for rate control  BP's currently stable. 4. Hyperphosphatemia-  In the setting of MAGGI  Will follow   PO4 5.3-->4.5->3.8->4.1->4.8      MASSIMO Malhotra - CNP      Patient seen and examined. No family member is present at the bedside. Chart reviewed. I had a face to face encounter with the patient.    Agree with exam.    Agree with  formulation, assessment and plan as outlined above and directed by me. Addition and corrections were done as deemed appropriate. My exam and plan include:     Continue current treatment as outlined above. Renal function is at a plateau serum creatinine still above baseline. We will follow for recovery of renal function.            Lasha Armstrong MD  Nephrology        Electronically signed by Lasha Armstrong MD on 10/28/2022 at 3:13 PM

## 2022-10-28 NOTE — PLAN OF CARE
Problem: Pain  Goal: Verbalizes/displays adequate comfort level or baseline comfort level  10/28/2022 1631 by Marion Siegel RN  Outcome: Completed     Problem: Safety - Adult  Goal: Free from fall injury  10/28/2022 1631 by Marion Siegel RN  Outcome: Completed     Problem: ABCDS Injury Assessment  Goal: Absence of physical injury  10/28/2022 1631 by Marion Siegel RN  Outcome: Completed     Problem: Discharge Planning  Goal: Discharge to home or other facility with appropriate resources  10/28/2022 1631 by Marion Siegel RN  Outcome: Completed     Problem: Skin/Tissue Integrity  Goal: Absence of new skin breakdown  Description: 1. Monitor for areas of redness and/or skin breakdown  2. Assess vascular access sites hourly  3. Every 4-6 hours minimum:  Change oxygen saturation probe site  4. Every 4-6 hours:  If on nasal continuous positive airway pressure, respiratory therapy assess nares and determine need for appliance change or resting period.   10/28/2022 1631 by Marion Siegel RN  Outcome: Completed

## 2022-10-28 NOTE — PROGRESS NOTES
NAME: Gini Mello  MR:  [id number]  :   1945  Admit Date:  10/21/2022      This is a face to face encounter with Gini Mello 76 y.o. female on 10/28/22  Elements of this note, including Diagnosis,  Interval History, Past Medical/Surgical/Family/Social Histories, ROS, physical exam, and Assessment and Plan were copied and pasted from Previous. Updates have been made where noted and reflect current exam and medical decision making from the DOS of this encounter. CHIEF COMPLAINT     ID following for   Chief Complaint   Patient presents with    Abdominal Pain     Was seen at [facility] yesterday, not feeling any better at this time, when asked regarding pain, patient states I feel a lump here pointing to scar right mid to lower quadrant, states from previous hernia. HISTORY OF PRESENT ILLNESS   ID was consulted on 10/24/22 for infection management  Pt presented to ER on 10/21/2022 with diagnosis of  Proctocolitis [K52.9]  Diverticulosis [K57.90]  Acute kidney injury ([facility] [address] [N17.9]  Idiopathic proctocolitis, other complication ([facility] [address] [J62.631]     AFEBRILE  PT HAS SITTER  HAS NO C/O F/C/N/V/D/  HAS PAIN RUQ  SPENCER CR1.5->2.5 WBC9.7   BLOOD CX CONS      Currently oN  piperacillin-tazobactam (ZOSYN) 3,375 mg in sodium chloride 0.9 % 50 mL IVPB (Ultm0Jwi), Q12H  vancomycin (VANCOCIN) 1,500 mg in dextrose 5 % 300 mL IVPB, Once     SEEN BY SURGERY proctocolitis and possible duodenitis  SEEN BY GI DUODENITIS/CONSTIPATION  SEEN BY RENAL SPENCER  Assessment & Plan   Proctocolitis/perirectal dermatitis   Cons bacteremia- contaminant   transaminitis  Spencer      Plan: mucinex  Continue  augmentin 1 week more  probiotics  Skin care  Can d/c       Pt seen and examined    DOS  10/28/22  In bed   Has no c/o f/c/n/v/   Has purwick   Has cough      Has telesitter in room     Patient is tolerating medications. No reported adverse drug reactions.   REVIEW OF SYSTEMS     As stated above in the chief complaint, otherwise negative.   CURRENT MEDICATIONS     Current Facility-Administered Medications:     nicotine (NICODERM CQ) 7 MG/24HR 1 patch, 1 patch, TransDERmal, Daily, López Esparza DO    miconazole (MICOTIN) 2 % powder, , Topical, BID, Trupti Sarmiento MD, Given at 10/28/22 1001    amoxicillin-clavulanate (AUGMENTIN) 500-125 MG per tablet 1 tablet, 1 tablet, Oral, 2 times per day, Cristiano Al, APRN - CNS, 1 tablet at 10/28/22 1138    pantoprazole (PROTONIX) tablet 40 mg, 40 mg, Oral, BID AC, Theodore Kowalski, , 40 mg at 10/28/22 0748    dextrose 5 % and 0.45 % sodium chloride infusion, , IntraVENous, Continuous, Nilson Andrade MD, Stopped at 10/28/22 0046    metoprolol succinate (TOPROL XL) extended release tablet 50 mg, 50 mg, Oral, BID, Val Burgos MD, 50 mg at 10/28/22 0954    isosorbide mononitrate (IMDUR) extended release tablet 30 mg, 30 mg, Oral, Daily, Val Burgos MD, 30 mg at 10/28/22 0954    hydrALAZINE (APRESOLINE) tablet 10 mg, 10 mg, Oral, 3 times per day, Val Burgos MD, 10 mg at 10/26/22 2119    metoprolol (LOPRESSOR) injection 5 mg, 5 mg, IntraVENous, Q5 Min PRN, Michael Bey DO, 5 mg at 10/25/22 0340    sodium chloride flush 0.9 % injection 5-40 mL, 5-40 mL, IntraVENous, 2 times per day, Sabino Jeronimo DO, 10 mL at 10/26/22 2117    sodium chloride flush 0.9 % injection 5-40 mL, 5-40 mL, IntraVENous, PRN, Sabino Jeronimo DO    0.9 % sodium chloride infusion, , IntraVENous, PRNSabino DO, Last Rate: 10 mL/hr at 10/26/22 1500, New Bag at 10/26/22 1500    heparin flush 100 UNIT/ML injection 100 Units, 1 mL, IntraVENous, 2 times per day, Sabino Jeronimo DO    heparin flush 100 UNIT/ML injection 100 Units, 1 mL, IntraCATHeter, PRN, Sabino Jeronimo DO    fentaNYL (SUBLIMAZE) injection 25 mcg, 25 mcg, IntraVENous, Q4H PRN, Sabino Jeronimo DO, 25 mcg at 10/23/22 1140    apixaban (ELIQUIS) tablet 5 mg, 5 mg, Oral, Daily, Sabino Jeronimo DO, 5 mg at 10/28/22 0900    vitamin B-12 (CYANOCOBALAMIN) tablet 1,000 mcg, 1,000 mcg, Oral, Daily, Arielle Luiz, DO, 1,000 mcg at 10/28/22 0954    albuterol (PROVENTIL) nebulizer solution 2.5 mg, 2.5 mg, Nebulization, Q4H PRN, Arielle Luiz, DO    budesonide (PULMICORT) nebulizer suspension 500 mcg, 0.5 mg, Nebulization, BID, Arielle Luiz, DO, 500 mcg at 10/28/22 3251    magnesium sulfate 1000 mg in dextrose 5% 100 mL IVPB, 1,000 mg, IntraVENous, PRN, Arielle Luiz, DO    sodium phosphate 13.05 mmol in sodium chloride 0.9 % 250 mL IVPB, 0.16 mmol/kg, IntraVENous, PRN **OR** sodium phosphate 26.1 mmol in sodium chloride 0.9 % 500 mL IVPB, 0.32 mmol/kg, IntraVENous, PRN, Arielle Luiz, DO    potassium chloride (KLOR-CON M) extended release tablet 40 mEq, 40 mEq, Oral, PRN **OR** potassium bicarb-citric acid (EFFER-K) effervescent tablet 40 mEq, 40 mEq, Oral, PRN, 40 mEq at 10/22/22 0827 **OR** potassium chloride 10 mEq/100 mL IVPB (Peripheral Line), 10 mEq, IntraVENous, PRN, Arielle Luiz, DO    Arformoterol Tartrate (BROVANA) nebulizer solution 15 mcg, 15 mcg, Nebulization, BID, Arielle Luiz, DO, 15 mcg at 10/28/22 2793    sodium chloride flush 0.9 % injection 5-40 mL, 5-40 mL, IntraVENous, 2 times per day, Arielle Luiz, DO, 10 mL at 10/23/22 2140    sodium chloride flush 0.9 % injection 5-40 mL, 5-40 mL, IntraVENous, PRN, Arielle Luiz, DO    0.9 % sodium chloride infusion, , IntraVENous, PRN, Arielle Luiz, DO    ondansetron (ZOFRAN-ODT) disintegrating tablet 4 mg, 4 mg, Oral, Q8H PRN **OR** ondansetron (ZOFRAN) injection 4 mg, 4 mg, IntraVENous, Q6H PRN, Arielle Luiz, DO, 4 mg at 10/23/22 2141    acetaminophen (TYLENOL) tablet 650 mg, 650 mg, Oral, Q6H PRN, 650 mg at 10/28/22 0737 **OR** acetaminophen (TYLENOL) suppository 650 mg, 650 mg, Rectal, Q6H PRN, Arielle Dee DO    sennosides-docusate sodium (SENOKOT-S) 8.6-50 MG tablet 2 tablet, 2 tablet, Oral, BID, Arielle Dee DO, 2 tablet at 10/28/22 0954    polyethylene glycol (GLYCOLAX) packet 17 g, 17 g, Oral, BID, Louischeri Kowalski DO, 17 g at 10/23/22 2140    bisacodyl (DULCOLAX) EC tablet 5 mg, 5 mg, Oral, Daily PRN, Tracey José Miguel, DO    lactulose (CHRONULAC) 10 GM/15ML solution 20 g, 20 g, Oral, BID, Tracey José Miguel, DO, 20 g at 10/23/22 2141    ipratropium-albuterol (DUONEB) nebulizer solution 1 ampule, 1 ampule, Inhalation, Q4H WA, Tracey José Miguel, DO, 1 ampule at 10/28/22 1426  PHYSICAL EXAM     /78   Pulse (!) 105   Temp 97.3 °F (36.3 °C) (Oral)   Resp 20   Ht 5' 6\" (1.676 m)   Wt 211 lb (95.7 kg)   SpO2 99%   BMI 34.06 kg/m²   Temp  Av.9 °F (36.6 °C)  Min: 97.3 °F (36.3 °C)  Max: 98.9 °F (37.2 °C)   CONSTITUTIONAL:  awake, alert, cooperative,   ENT:  Normocephalic,  atraumatic, sinuses   LUNGS:  No increased work of breathing, diminished to bases On nasal canula. 2l  CARDIOVASCULAR:   regular rate and rhythm, normal S1 and S2,   ABDOMEN:    normal bowel sounds, soft, non-distended, non-tender,    MUSCULOSKELETAL:  There is no redness, warmth, or swelling of the joints. Full range of motion noted. edema  NEUROLOGIC:  Awake, alert, oriented    SKIN:  normal skin color, texture, turgor and no rashes perirectal area red  PIV     Peripheral Intravenous Line:  Peripheral IV 10/23/22 Left Forearm (Active)   Site Assessment Clean, dry & intact 10/24/22 2000   Line Status Infusing 10/24/22 2000   Line Care Connections checked and tightened 10/24/22 2000   Phlebitis Assessment No symptoms 10/24/22 2000   Infiltration Assessment 0 10/24/22 2000   Alcohol Cap Used Yes 10/24/22 2000   Dressing Status Clean, dry & intact 10/24/22 2000   Dressing Type Transparent 10/24/22 2000   Dressing Intervention Other (Comment) 10/24/22 2000       Peripheral IV 10/24/22 Right Wrist (Active)   Site Assessment Clean, dry & intact 10/24/22 2000   Line Status Blood return noted; Flushed; Infusing 10/24/22 2000   Line Care Connections checked and tightened 10/24/22 2000   Phlebitis Assessment No symptoms 10/24/22 2000   Infiltration Assessment 0 10/24/22 2000 Alcohol Cap Used Yes 10/24/22 2000   Dressing Status Clean, dry & intact 10/24/22 2000   Dressing Type Transparent 10/24/22 2000   Dressing Intervention Other (Comment) 10/24/22 2000       Peripheral IV 10/25/22 Left; Anterior Cephalic (Active)   Site Assessment Clean, dry & intact 10/25/22 1248   Line Status Blood return noted;Brisk blood return;Flushed;Capped;Normal saline locked 10/25/22 1248   Phlebitis Assessment No symptoms 10/25/22 1248   Infiltration Assessment 0 10/25/22 1248   Alcohol Cap Used Yes 10/25/22 1248   Dressing Status New dressing applied;Clean, dry & intact 10/25/22 1248   Dressing Type Transparent 10/25/22 1248   Dressing Intervention New 10/25/22 1248       Drain(s):  Urinary Catheter 10/23/22 Vargas (Active)   $ Urethral catheter insertion $ Not inserted for procedure 10/23/22 1030   Catheter Indications Urinary retention (acute or chronic), continuous bladder irrigation or bladder outlet obstruction; Need for fluid volume management of the critically ill patient in a critical care setting 10/24/22 2000   Site Assessment No urethral drainage 10/24/22 2000   Urine Color Sally 10/24/22 2000   Urine Appearance Clear 10/24/22 2000   Collection Container Standard 10/24/22 2000   Securement Method Leg strap 10/24/22 2000   Catheter Care Completed Yes 10/24/22 2000   Catheter Best Practices  Drainage tube clipped to bed;Catheter secured to thigh; Bag below bladder;Bag not on floor; Lack of dependent loop in tubing;Drainage bag less than half full 10/24/22 2000   Status Draining 10/24/22 2000   Output (mL) 450 mL 10/25/22 0625     DIAGNOSTIC RESULTS   Radiology:    Recent Labs     10/26/22  0321 10/27/22  0613 10/28/22  0630   WBC 9.3 9.3 9.8   RBC 4.26 4.55 4.50   HGB 11.4* 12.1 12.2   HCT 36.8 39.6 38.9   MCV 86.4 87.0 86.4   MCH 26.8 26.6 27.1   MCHC 31.0* 30.6* 31.4*   RDW 18.0* 17.9* 18.6*    205 197   MPV 11.2 11.3 11.5       Recent Labs     10/26/22  0321 10/26/22  1021 10/27/22  7592 10/28/22  0630    136 138 137   K 4.2 4.4 4.6 4.8    104 104 103   CO2 23 22 19* 21*   BUN 79* 75* 77* 85*   CREATININE 2.0* 2.0* 1.9* 2.1*   GLUCOSE 152* 161* 138* 120*   PROT 5.2*  --  5.2* 5.4*   LABALBU 3.1*  --  3.3* 3.4*   CALCIUM 8.2* 8.3* 8.4* 8.5*   BILITOT 0.7  --  0.9 0.9   ALKPHOS 86  --  87 96   *  --  84* 45*   *  --  109* 87*       Recent Labs     10/26/22  0321 10/27/22  0613 10/28/22  0630   * 84* 45*   * 109* 87*       Lab Results   Component Value Date/Time    CHOL 140 10/22/2022 05:09 AM    TRIG 83 10/22/2022 05:09 AM    HDL 21 10/22/2022 05:09 AM    LDLCALC 102 10/22/2022 05:09 AM    LABVLDL 17 10/22/2022 05:09 AM     Microbiology:   Recent Labs     10/28/22  1345   COVID19 Not Detected     Lab Results   Component Value Date/Time    BC 24 Hours no growth 10/24/2022 02:22 PM    BC  10/21/2022 06:45 AM     This organism was isolated in one set. Susceptibility testing is not routinely done as this  organism frequently represents skin contamination. Additional testing can be ordered by calling the  Microbiology Department. BLOODCULT2 24 Hours no growth 10/24/2022 02:22 PM    BLOODCULT2 5 Days no growth 10/21/2022 10:12 AM    ORG Staphylococcus coagulase-negative 10/21/2022 06:45 AM           Imaging and labs were reviewed. Adam Felix was informed of their diagnosis, indications, risks and benefits of treatment. Adam Felix had the opportunity to ask questions. All questions were answered. Thank you for involving me in the care of Adam Felix. Please do not hesitate to call for any questions or concerns.     Electronically signed by José Manuel Hayes MD on 10/28/2022 at 4:03 PM

## 2022-10-28 NOTE — DISCHARGE INSTR - COC
Continuity of Care Form    Patient Name: Juan C Kessler   :  1945  MRN:  [id number]    Admit date:  10/21/2022  Discharge date:  10//    Code Status Order: Full Code   Advance Directives:     Admitting Physician:  Yamile Gr DO  PCP: Imelda Grajeda MD    Discharging Nurse: [facility] Unit/Room#: 5688/4400-08  Discharging Unit Phone Number: [phone]    Emergency Contact:   Extended Emergency Contact Information  Primary Emergency Contact: Rolando Perez  Address: [address] [facility], [address] [place] [address] Phone: [phone]  Mobile Phone: [phone]  Relation: Child   needed? No  Secondary Emergency Contact: [facility] AND  [facility] [place] [facility]\"" Phone: [phone]  Mobile Phone: [phone]  Relation: Child   needed?  No    Past Surgical History:  Past Surgical History:   Procedure Laterality Date    APPENDECTOMY      CHOLECYSTECTOMY, OPEN      HYSTERECTOMY (CERVIX STATUS UNKNOWN)      INCISIONAL HERNIA REPAIR         Immunization History:   Immunization History   Administered Date(s) Administered    COVID-19, PFIZER Bivalent BOOSTER, (age 12y+), IM, 30 mcg/0.3 mL dose 09/15/2022    COVID-19, PFIZER PURPLE top, DILUTE for use, (age 15 y+), 30mcg/0.3mL 2021, 2021, 2021       Active Problems:  Patient Active Problem List   Diagnosis Code    Generalized abdominal pain R10.84    Proctocolitis K52.9       Isolation/Infection:   Isolation            No Isolation          Patient Infection Status       Infection Onset Added Last Indicated Last Indicated By Review Planned Expiration Resolved Resolved By    None active    Resolved    COVID-19 (Rule Out) 22 COVID-19, Rapid (Ordered)   22 Rule-Out Test Resulted            Nurse Assessment:  Last Vital Signs: /78   Pulse (!) 105   Temp 97.3 °F (36.3 °C) (Oral)   Resp 20   Ht 5' 6\" (1.676 m)   Wt 211 lb (95.7 kg)   SpO2 99%   BMI 34.06 kg/m²     Last documented pain score (0-10 scale): Pain Level: 2  Last Weight:   Wt Readings from Last 1 Encounters:   10/28/22 211 lb (95.7 kg)     Mental Status:  {IP PT MENTAL STATUS:20030}A&Ox2    IV Access:  - None    Nursing Mobility/ADLs:  Walking   Dependent  Transfer  Dependent  Bathing  Dependent  Dressing  Dependent  Toileting  Dependent  Feeding  Assisted  Med Admin  Assisted  Med Delivery   whole    Wound Care Documentation and Therapy:        Elimination:  Continence: Bowel: No  Bladder: No  Urinary Catheter: None   Colostomy/Ileostomy/Ileal Conduit: No       Date of Last BM: 10/27/2022    Intake/Output Summary (Last 24 hours) at 10/28/2022 1323  Last data filed at 10/28/2022 0457  Gross per 24 hour   Intake 564.44 ml   Output 850 ml   Net -285.56 ml     I/O last 3 completed shifts: In: 2764.4 [P.O.:820; I.V.:1944.4]  Out: 1850 [Urine:1850]    Safety Concerns:     Sundowners Sundrome and At Risk for Falls    Impairments/Disabilities:      None    Nutrition Therapy:  Current Nutrition Therapy:   - Oral Diet:  General    Routes of Feeding: Oral  Liquids: Thin Liquids  Daily Fluid Restriction: no  Last Modified Barium Swallow with Video (Video Swallowing Test): not done    Treatments at the Time of Hospital Discharge:   Respiratory Treatments: Please refer to [facility]  Oxygen Therapy:  is on oxygen at 2 L/min per nasal cannula.   Ventilator:    - No ventilator support    Rehab Therapies: Physical Therapy and Occupational Therapy  Weight Bearing Status/Restrictions: No weight bearing restrictions  Other Medical Equipment (for information only, NOT a DME order):  {EQUIPMENT:954279692}  Other Treatments: ***    Patient's personal belongings (please select all that are sent with patient):  Glasses    RN SIGNATURE:  Electronically signed by Margo Cao RN on 10/28/22 at 4:56 PM EDT    CASE MANAGEMENT/SOCIAL WORK SECTION    Inpatient Status Date: ***    Readmission Risk Assessment Score:  Readmission Risk Risk of Unplanned Readmission:  24           Discharging to Facility/ Agency   Name:   Address:  Phone:  Fax:    Dialysis Facility (if applicable)   Name:  Address:  Dialysis Schedule:  Phone:  Fax:    / signature: {Esignature:[id number]}    PHYSICIAN SECTION    Prognosis: {Prognosis:7824020829}    Condition at Discharge: Poppy Carcamo Patient Condition:877562500}    Rehab Potential (if transferring to Rehab): {Prognosis:1055209247}    Recommended Labs or Other Treatments After Discharge: ***    Physician Certification: I certify the above information and transfer of Ike Milton  is necessary for the continuing treatment of the diagnosis listed and that she requires {Admit to Appropriate Level of Care:78885} for {GREATER/LESS:907190669} 30 days.      Update Admission H&P: {CHP DME Changes in ELOMX:968799764}    PHYSICIAN SIGNATURE:  Electronically signed by Brisa Smith DO on 10/28/22 at 1:23 PM EDT

## 2022-10-29 NOTE — DISCHARGE SUMMARY
[address]                               DISCHARGE SUMMARY    PATIENT NAME: Kadi Tesfaye                   :        1945  MED REC NO:   [id number]                            ROOM:       0515  ACCOUNT NO:   [de-identified]                           ADMIT DATE: 10/21/2022  PROVIDER:     Nusrat Hyde DO                  DISCHARGE DATE:  10/28/2022    ADMITTING DIAGNOSES:  Dyspnea. FINAL DISCHARGE DIAGNOSES:  Acute proctocolitis in the setting of severe  constipation, radiographic evidence of duodenitis, the patient refusing  EGD evaluation. SECONDARY DISCHARGE DIAGNOSES:  Coagulase-negative Staphylococcus  bacteremia, x1 bottle, compatible with contamination; acute renal  failure superimposed on chronic kidney disease stage III. Atrial  fibrillation with intermittent period of rapid ventricular response;  severe cardiomyopathy with ejection fraction 10% to 15%, the patient  refusing further intervention; history of non-Hodgkin lymphoma,  currently on remission; COPD with ongoing tobacco abuse; hypertension;  and early dementia. COMPLICATIONS:  No complications. OPERATION PERFORMED:  None. CONSULTATION OBTAINED:  With [facility], Dr. Elinor Jacobson, Dr. Michelle Bunn,  Dr. Margarito Goldstein of Nephrology, and Dr. Becky Mireles. ADMITTING PHYSICIANS:  Dr. Kemal Short and  [facility]. CHIEF COMPLAINT AND HISTORY OF CHIEF COMPLAINT:  This is a 68-year-old  white female who was admitted to [address] [place] [address]. The patient  presented to the hospital here on 10/21/2022. The patient presented to  the emergency room for evaluation of intractable lower quadrant  abdominal pain, constipation over the past five days. When in the  emergency room, the patient did have a small bowel movement, but  continued to describe abdominal pain. Workup at this time did show mild  proctocolitis.   The patient was admitted to the hospital at this time.    PAST MEDICAL HISTORY:  Positive for history of hypertension and  lymphoma. PHYSICAL EXAMINATION:  VITAL SIGNS:  Blood pressure to be 111/73, pulse 110, respirations 19,  O2 sat 95%, temperature 97.1. GENERAL APPEARANCE:  Overweight, 68-year-old white female who is alert,  cooperative. HEENT:  Normal.  LUNGS:  Diminished. HEART:  Regular rate and rhythm without murmur. ABDOMEN:  Soft. EXTREMITIES:  Some edema. LABORATORY DATA:  While the patient was in the hospital, she had the  following laboratory studies performed:  Hemoglobin and hematocrit was  12.7 and 40.3 respectively. White count 10,000. Platelet count  adequate. BUN and creatinine were 16 and 1.5 respectively. HOSPITAL COURSE OF STAY:  The patient was admitted directly to the  hospital to the immediate care unit. The patient was admitted under the  service of Dr. Negra Wilson and Dr. Mackenzie Julio. The patient did relatively well at  this time. Did complain of some shortness of breath at which time she  was transferred to the ICU. The patient was there on 10/24/2022, for  the next several days. Further workup at that time did show evidence of  severe congestive cardiomyopathy. The patient was seen by  multi-specialists at that time including Cardiology, refused any further  workup. Nephrology was following because of the renal function along  with ID, Infectious Disease, because of possible blood contamination. Optimal care was given. The patient at this time did require nursing  home placement. The patient is overall clinically improved with  occasional acute delirium, which resolved at the time of discharge. The  patient was felt stable enough to be discharged to the nursing home on  10/28/2022. At the time of discharge on 10/28/2022, revealed the  following:  COVID test was negative. Phosphorus 4.9.  BUN and  creatinine were 85 and 2.4 respectively. Electrolytes were  satisfactory.   Hemoglobin and hematocrit were 12.2 and 38.9  respectively. The patient had a white count of 9.8. At the time  of discharge did show an EF of only 15% along with findings of severe  aortic stenosis. The patient at this time did refuse any further  intervention. The patient should reevaluate her code status at this  time. The patient was discharged to the nursing home in stable  condition by ambulance. At the time of discharge, blood pressure was 109/78, temperature 97.3,  pulse 105, respirations 20, O2 sat 99%. Height is 5 feet 6 inches,  weight 211 pounds. The patient was discharged on Augmentin 500/125  every 12 hours for seven days, Brovana 15 mcg every 12 hours, Dulcolax 5  mg daily, Pulmicort 0.5 every 12 hours, Apresoline 10 mg every 8 hours  for afterload reduction, Duoneb 2.5 mg every 6 hours, Imdur 30 daily,  lactobacillus as needed, Chronulac 30 mL b.i.d., Nicoderm patch 7 mg for 24  hours, Protonix 40 daily, GlycoLax 17 gm daily along with Senokot one  daily. The patient will continue metoprolol XL 50 mg b.i.d., Eliquis 5  b.i.d., Ventolin two puffs every 4 hours p.r.n., and vitamin B12 at 1000  daily. The patient will be re-instituted on diuretic therapy based upon  the patient's clinical response. The patient's overall prognosis at  this time was poor. She will be discharged on O2 as needed. On the day of discharge, more than 40 minutes were spent with more than  50% of the time spent face-to-face with the patient discussing plan of  care and treatment at this time. Recommendations were frequent  monitoring of the lab work at the nursing home. The patient did defer  any aggressive workup at this time.         [address], DO    D: 10/28/2022 16:24:00       T: 10/29/2022 11:00:03     SRINIVAS  Job#: [id number]     Doc#: [id number]    CC:

## 2022-10-30 NOTE — PROGRESS NOTES
Physician Progress Note      PATIENT:               Yana Mack  CSN #:                  [id number]  :                       1945  ADMIT DATE:       10/21/2022 4:18 AM  DISCH DATE:        10/28/2022 6:20 PM  RESPONDING  PROVIDER #:        Tayla Ingram DO          QUERY TEXT:    Patient admitted with acute proctocolitis with acute renal failure  and has   CHF documented. If possible, please document in progress notes and discharge   summary further specificity regarding the type and acuity of CHF:    The medical record reflects the following:  Risk Factors: 76 yof PAF HTN COPD acute renal failure cardiomyopathy  Clinical Indicators: echo 10/25/22  EF est 15%  mildly dilated right ventricle    left atrium severe aortic stenosis; CXR 10/21: Moderate cardiomegaly. Mild   perihilar pulmonary edema. no effusion. CXR 10/26/22: mildly suspected   atelectasis and or infiltrate at right base  heart is enlarged  pulmonary   vascularity is borderline congested. pneumothorax. Treatment: CXR echo cardiology nephrology ID consult   metoprolol hydralazine    Thank you  Estela GARVEY RN CDS  [phone] M-F 6am-2pm  Options provided:  -- Chronic Systolic CHF/HFrEF  -- Chronic Diastolic CHF/HFpEF  -- Chronic Systolic and Diastolic CHF  -- Other - I will add my own diagnosis  -- Disagree - Not applicable / Not valid  -- Disagree - Clinically unable to determine / Unknown  -- Refer to Clinical Documentation Reviewer    PROVIDER RESPONSE TEXT:    This patient has chronic systolic CHF/HFrEF.     Query created by: Jewel Bundy on 10/28/2022 7:16 AM      Electronically signed by:  Tayla Ingram DO 10/30/2022 5:53 PM